# Patient Record
Sex: MALE | Race: WHITE | NOT HISPANIC OR LATINO | Employment: OTHER | URBAN - METROPOLITAN AREA
[De-identification: names, ages, dates, MRNs, and addresses within clinical notes are randomized per-mention and may not be internally consistent; named-entity substitution may affect disease eponyms.]

---

## 2017-06-29 ENCOUNTER — HOSPITAL ENCOUNTER (OUTPATIENT)
Dept: CARDIOLOGY | Facility: HOSPITAL | Age: 67
Discharge: HOME | End: 2017-06-29
Attending: NURSE PRACTITIONER | Admitting: NURSE PRACTITIONER

## 2019-06-11 ENCOUNTER — TRANSCRIBE ORDERS (OUTPATIENT)
Dept: ADMINISTRATIVE | Facility: HOSPITAL | Age: 69
End: 2019-06-11

## 2019-06-11 DIAGNOSIS — I10 HYPERTENSION, UNSPECIFIED TYPE: ICD-10-CM

## 2019-06-11 DIAGNOSIS — I25.10 CVD (CARDIOVASCULAR DISEASE): Primary | ICD-10-CM

## 2019-07-08 ENCOUNTER — HOSPITAL ENCOUNTER (OUTPATIENT)
Dept: NUCLEAR MEDICINE | Facility: HOSPITAL | Age: 69
Discharge: HOME | End: 2019-07-08

## 2019-07-08 DIAGNOSIS — I25.10 CVD (CARDIOVASCULAR DISEASE): ICD-10-CM

## 2019-07-08 DIAGNOSIS — I10 HYPERTENSION, UNSPECIFIED TYPE: ICD-10-CM

## 2019-07-08 LAB
BH CV NUCLEAR PRIOR STUDY: 3
BH CV STRESS BP STAGE 1: NORMAL
BH CV STRESS BP STAGE 2: NORMAL
BH CV STRESS DURATION MIN STAGE 1: 3
BH CV STRESS DURATION MIN STAGE 2: 3
BH CV STRESS DURATION SEC STAGE 1: 0
BH CV STRESS DURATION SEC STAGE 2: 0
BH CV STRESS GRADE STAGE 1: 10
BH CV STRESS GRADE STAGE 2: 12
BH CV STRESS HR STAGE 1: 108
BH CV STRESS HR STAGE 2: 128
BH CV STRESS METS STAGE 1: 5
BH CV STRESS METS STAGE 2: 7.5
BH CV STRESS PROTOCOL 1: NORMAL
BH CV STRESS RECOVERY BP: NORMAL MMHG
BH CV STRESS RECOVERY HR: 65 BPM
BH CV STRESS SPEED STAGE 1: 1.7
BH CV STRESS SPEED STAGE 2: 2.5
BH CV STRESS STAGE 1: 1
BH CV STRESS STAGE 2: 2
LV EF NUC BP: 46 %
MAXIMAL PREDICTED HEART RATE: 151 BPM
PERCENT MAX PREDICTED HR: 84.77 %
STRESS BASELINE BP: NORMAL MMHG
STRESS BASELINE HR: 58 BPM
STRESS PERCENT HR: 100 %
STRESS POST EXERCISE DUR MIN: 4 MIN
STRESS POST EXERCISE DUR SEC: 30 SEC
STRESS POST PEAK BP: NORMAL MMHG
STRESS POST PEAK HR: 128 BPM
STRESS TARGET HR: 128 BPM

## 2019-07-08 PROCEDURE — 78452 HT MUSCLE IMAGE SPECT MULT: CPT

## 2019-07-08 PROCEDURE — A9500 TC99M SESTAMIBI: HCPCS | Performed by: INTERNAL MEDICINE

## 2019-07-08 PROCEDURE — 0 TECHNETIUM SESTAMIBI: Performed by: INTERNAL MEDICINE

## 2019-07-08 PROCEDURE — 93016 CV STRESS TEST SUPVJ ONLY: CPT | Performed by: NURSE PRACTITIONER

## 2019-07-08 PROCEDURE — 78452 HT MUSCLE IMAGE SPECT MULT: CPT | Performed by: INTERNAL MEDICINE

## 2019-07-08 PROCEDURE — 93017 CV STRESS TEST TRACING ONLY: CPT

## 2019-07-08 PROCEDURE — 93018 CV STRESS TEST I&R ONLY: CPT | Performed by: INTERNAL MEDICINE

## 2019-07-08 RX ADMIN — TECHNETIUM TC 99M SESTAMIBI 1 DOSE: 1 INJECTION INTRAVENOUS at 10:00

## 2019-07-08 RX ADMIN — TECHNETIUM TC 99M SESTAMIBI 1 DOSE: 1 INJECTION INTRAVENOUS at 11:05

## 2019-08-05 ENCOUNTER — OFFICE VISIT (OUTPATIENT)
Dept: CARDIOLOGY | Facility: CLINIC | Age: 69
End: 2019-08-05

## 2019-08-05 VITALS
DIASTOLIC BLOOD PRESSURE: 72 MMHG | OXYGEN SATURATION: 99 % | WEIGHT: 193.4 LBS | SYSTOLIC BLOOD PRESSURE: 122 MMHG | BODY MASS INDEX: 31.08 KG/M2 | HEIGHT: 66 IN | HEART RATE: 59 BPM

## 2019-08-05 DIAGNOSIS — Z95.1 STATUS POST CORONARY ARTERY BYPASS GRAFT: ICD-10-CM

## 2019-08-05 DIAGNOSIS — I10 ESSENTIAL HYPERTENSION: ICD-10-CM

## 2019-08-05 DIAGNOSIS — I65.23 BILATERAL CAROTID ARTERY STENOSIS: ICD-10-CM

## 2019-08-05 DIAGNOSIS — I25.118 CORONARY ARTERY DISEASE OF NATIVE ARTERY OF NATIVE HEART WITH STABLE ANGINA PECTORIS (HCC): Primary | ICD-10-CM

## 2019-08-05 PROBLEM — R00.1 BRADYCARDIA: Status: ACTIVE | Noted: 2018-06-13

## 2019-08-05 PROBLEM — I25.10 CORONARY ARTERY DISEASE: Status: ACTIVE | Noted: 2019-08-05

## 2019-08-05 PROBLEM — E78.2 HYPERLIPIDEMIA, MIXED: Status: ACTIVE | Noted: 2019-08-05

## 2019-08-05 PROCEDURE — 99213 OFFICE O/P EST LOW 20 MIN: CPT | Performed by: INTERNAL MEDICINE

## 2019-08-05 RX ORDER — AMLODIPINE BESYLATE 5 MG/1
5 TABLET ORAL DAILY
Refills: 2 | COMMUNITY
Start: 2019-07-12 | End: 2019-11-04 | Stop reason: SDUPTHER

## 2019-08-05 RX ORDER — HYDROCODONE BITARTRATE AND ACETAMINOPHEN 7.5; 325 MG/1; MG/1
1-2 TABLET ORAL
COMMUNITY
Start: 2018-11-22 | End: 2020-08-06

## 2019-08-05 RX ORDER — LOSARTAN POTASSIUM AND HYDROCHLOROTHIAZIDE 12.5; 1 MG/1; MG/1
1 TABLET ORAL DAILY
Refills: 3 | COMMUNITY
Start: 2019-06-13 | End: 2020-08-06

## 2019-08-05 RX ORDER — NITROGLYCERIN 0.4 MG/1
TABLET SUBLINGUAL
COMMUNITY
Start: 2015-06-08 | End: 2022-09-06 | Stop reason: SDUPTHER

## 2019-08-05 RX ORDER — NAPROXEN SODIUM 220 MG
TABLET ORAL 2 TIMES DAILY
COMMUNITY
Start: 2018-06-07 | End: 2021-08-06

## 2019-08-05 RX ORDER — GEMFIBROZIL 600 MG/1
TABLET, FILM COATED ORAL DAILY
COMMUNITY
Start: 2018-05-29 | End: 2019-11-04 | Stop reason: SDUPTHER

## 2019-08-05 RX ORDER — GLIPIZIDE 5 MG/1
2.5 TABLET ORAL DAILY
COMMUNITY
Start: 2019-08-01

## 2019-08-05 RX ORDER — CYCLOBENZAPRINE HCL 10 MG
10 TABLET ORAL 2 TIMES DAILY
COMMUNITY
Start: 2018-11-21 | End: 2020-08-06

## 2019-11-04 RX ORDER — AMLODIPINE BESYLATE 5 MG/1
TABLET ORAL
Qty: 90 TABLET | Refills: 2 | Status: SHIPPED | OUTPATIENT
Start: 2019-11-04 | End: 2020-08-25

## 2019-11-04 RX ORDER — GEMFIBROZIL 600 MG/1
TABLET, FILM COATED ORAL
Qty: 90 TABLET | Refills: 3 | Status: SHIPPED | OUTPATIENT
Start: 2019-11-04 | End: 2020-12-02

## 2020-07-13 RX ORDER — LOSARTAN POTASSIUM 100 MG/1
TABLET ORAL
Qty: 90 TABLET | Refills: 0 | Status: SHIPPED | OUTPATIENT
Start: 2020-07-13 | End: 2020-10-19

## 2020-08-06 ENCOUNTER — OFFICE VISIT (OUTPATIENT)
Dept: CARDIOLOGY | Facility: CLINIC | Age: 70
End: 2020-08-06

## 2020-08-06 VITALS
SYSTOLIC BLOOD PRESSURE: 122 MMHG | WEIGHT: 189 LBS | HEART RATE: 54 BPM | OXYGEN SATURATION: 98 % | DIASTOLIC BLOOD PRESSURE: 72 MMHG | HEIGHT: 66 IN | BODY MASS INDEX: 30.37 KG/M2

## 2020-08-06 DIAGNOSIS — I25.10 CORONARY ARTERY DISEASE INVOLVING NATIVE CORONARY ARTERY OF NATIVE HEART WITHOUT ANGINA PECTORIS: Primary | ICD-10-CM

## 2020-08-06 DIAGNOSIS — I10 ESSENTIAL HYPERTENSION: ICD-10-CM

## 2020-08-06 PROCEDURE — 99213 OFFICE O/P EST LOW 20 MIN: CPT | Performed by: INTERNAL MEDICINE

## 2020-08-06 PROCEDURE — 93000 ELECTROCARDIOGRAM COMPLETE: CPT | Performed by: INTERNAL MEDICINE

## 2020-08-06 NOTE — PROGRESS NOTES
Cardiology Office Visit Note      Referring physician:  Lupe Santos APRN    Reason For Followup: Annual    HPI:  Tye Avilez is a 69 y.o. male. Presents today for his annual follow up. Patient has known  history of advanced  coronary artery disease requiring CABG in 1998, angioplasty with intracoronary stent to the saphenous vein graft to the posterior lateral marginal branch 2011. Additionally he has a history of hypertensive heart disease, dyslipidemia, degenerative joint disease, DM and history of polio    Patient b/p at home has been in the range of 120's.    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    Patient does no exercising daily, and yet maintains satisfactory functional activity status which is limited most by his chronic back pain.    Patient denies any chest pain,SOB,PND,Palpitations, or any racing of the heart.     Patient is compliant medications as listed and reviewed in detail today.        Past Medical History:   Diagnosis Date   • Coronary artery disease    • Hyperlipidemia    • Hypertension        Past Surgical History:   Procedure Laterality Date   • CARDIAC CATHETERIZATION      2015   • CAROTID STENT  2015    PCI  to SVG supplying the LC/OM   • CORONARY ARTERY BYPASS GRAFT      1998         Current Outpatient Medications:   •  amLODIPine (NORVASC) 5 MG tablet, TAKE 1 TABLET BY MOUTH ONCE DAILY, Disp: 90 tablet, Rfl: 2  •  aspirin 81 MG tablet, Take 81 mg by mouth Daily., Disp: , Rfl:   •  gemfibrozil (LOPID) 600 MG tablet, TAKE 1 TABLET BY MOUTH ONCE DAILY, Disp: 90 tablet, Rfl: 3  •  glipiZIDE (GLUCOTROL) 5 MG tablet, Daily., Disp: , Rfl:   •  losartan (COZAAR) 100 MG tablet, Take 1 tablet by mouth once daily (Patient taking differently: Take 50 mg by mouth Daily.), Disp: 90 tablet, Rfl: 0  •  metFORMIN (GLUCOPHAGE) 500 MG tablet, Take 1,500 mg by mouth See Admin Instructions. take 2 tablets by mouth in the morning and 1 in the  "evening, Disp: , Rfl: 0  •  naproxen sodium (ALEVE) 220 MG tablet, 2 (Two) Times a Day. prn, Disp: , Rfl:   •  nitroglycerin (NITROSTAT) 0.4 MG SL tablet, Prn chest pain, Disp: , Rfl:     Social History     Socioeconomic History   • Marital status:      Spouse name: Not on file   • Number of children: Not on file   • Years of education: Not on file   • Highest education level: Not on file   Tobacco Use   • Smoking status: Former Smoker   • Smokeless tobacco: Never Used   Substance and Sexual Activity   • Alcohol use: Never     Frequency: Never   • Drug use: Never   • Sexual activity: Defer       History reviewed. No pertinent family history.      Review of Systems   General: denies fever, chills, anorexia, weight loss  Eyes: denies blurring, diplopia  Ear/Nose/Throat: denies ear pain, nosebleeds, hoarseness  Cardiovascular: See HPI  Respiratory: denies excessive sputum, hemoptysis, wheezing  Gastrointestinal: denies nausea, vomiting, change in bowel habits, abdominal pain  Genitourinary: Nocturia usually once per night; no hematuria or dysuria reported  Musculoskeletal: Chronic low back pain is functionally limiting-see HPI  Skin: denies rashes, itching, suspicious lesions  Neurologic: denies focal neuro deficits  Psychiatric: denies depression, anxiety  Endocrine: denies cold intolerance, heat intolerance  Hematologic/Lymphatic: denies abnormal bruising, bleeding  Allergic/Immunologic: denies urticaria or persistent infections      Objective     Visit Vitals  /72   Pulse 54   Ht 167.6 cm (65.98\")   Wt 85.7 kg (189 lb)   SpO2 98%   BMI 30.52 kg/m²           Physical Exam  General:     Obese, well developed,, in no acute distress.    Head:     normocephalic and atraumatic.    Eyes:    PERRL/EOM intact, conjunctiva and sclera clear with out nystagmus.    Neck:    no jvd or bruits  Chest Wall:    no deformities   Lungs:    clear bilaterally to auscultation with adequate global airflow   Heart:    " non-displaced PMI; regular rate and rhythm, normal S1, S2 without murmurs, rubs, or gallops  Abdomen:  Soft, nontender without HSM  Msk:    no deformity; adequate R OM  Pulses:    pulses normal in all 4 extremities.    Extremities:    no clubbing, cyanosis, edema   Neurologic:    no focal sensory or motor deficits  Skin:    intact without lesions or rashes.    Psych:    alert and cooperative; normal mood and affect; normal attention span and concentration.            ECG 12 Lead  Date/Time: 8/6/2020 7:58 AM  Performed by: IRWIN Murguia MD  Authorized by: IRWIN Murguia MD   Comparison: not compared with previous ECG   Previous ECG: no previous ECG available  Rhythm: sinus rhythm    Clinical impression: normal ECG              Assessment:   Problems Addressed this Visit        Cardiovascular and Mediastinum    Coronary artery disease - Primary    -Remains hemodynamically stable well compensated and angina free  -Status post multivessel CABG 2008 with subsequent PCI to SVG to left circumflex 2011        Essential hypertension    -Well-regulated on current medication listed and reviewed in detail       Dyslipidemia  -Maintained on Lopid; statin intolerant.  -Followed by PCP         Plan:  Continue current medications as listed reviewed in detail today as patient remains hemodynamically stable well compensated and free of angina or any other cardiopulmonary issues at this time.  Weight reduction regular progressive exercise and ongoing healthy heart lifestyle is encouraged today.  We will see Mr. Caballero for follow-up in 1 year or sooner if needed.    IRWIN Murguia MD  8/6/2020 11:35    This report was generated using the Dragon voice recognition system.

## 2020-08-25 RX ORDER — AMLODIPINE BESYLATE 5 MG/1
TABLET ORAL
Qty: 90 TABLET | Refills: 0 | Status: SHIPPED | OUTPATIENT
Start: 2020-08-25 | End: 2020-12-01

## 2020-10-19 RX ORDER — LOSARTAN POTASSIUM 100 MG/1
TABLET ORAL
Qty: 90 TABLET | Refills: 2 | Status: SHIPPED | OUTPATIENT
Start: 2020-10-19 | End: 2021-09-03

## 2020-12-01 DIAGNOSIS — E78.2 HYPERLIPIDEMIA, MIXED: Primary | ICD-10-CM

## 2020-12-01 RX ORDER — AMLODIPINE BESYLATE 5 MG/1
TABLET ORAL
Qty: 90 TABLET | Refills: 0 | Status: SHIPPED | OUTPATIENT
Start: 2020-12-01 | End: 2021-10-12 | Stop reason: SDUPTHER

## 2020-12-02 RX ORDER — CYANOCOBALAMIN 1000 UG/ML
INJECTION, SOLUTION INTRAMUSCULAR; SUBCUTANEOUS
COMMUNITY
Start: 2020-08-31 | End: 2021-08-06

## 2020-12-02 RX ORDER — GEMFIBROZIL 600 MG/1
TABLET, FILM COATED ORAL
Qty: 90 TABLET | Refills: 0 | Status: SHIPPED | OUTPATIENT
Start: 2020-12-02 | End: 2021-10-12 | Stop reason: SDUPTHER

## 2020-12-15 ENCOUNTER — TELEPHONE (OUTPATIENT)
Dept: CARDIOLOGY | Facility: CLINIC | Age: 70
End: 2020-12-15

## 2021-03-09 RX ORDER — GEMFIBROZIL 600 MG/1
TABLET, FILM COATED ORAL
Qty: 90 TABLET | Refills: 0 | OUTPATIENT
Start: 2021-03-09

## 2021-03-09 RX ORDER — AMLODIPINE BESYLATE 5 MG/1
TABLET ORAL
Qty: 90 TABLET | Refills: 0 | OUTPATIENT
Start: 2021-03-09

## 2021-08-06 ENCOUNTER — OFFICE VISIT (OUTPATIENT)
Dept: CARDIOLOGY | Facility: CLINIC | Age: 71
End: 2021-08-06

## 2021-08-06 VITALS
HEIGHT: 66 IN | DIASTOLIC BLOOD PRESSURE: 78 MMHG | HEART RATE: 61 BPM | SYSTOLIC BLOOD PRESSURE: 158 MMHG | BODY MASS INDEX: 31.34 KG/M2 | WEIGHT: 195 LBS | OXYGEN SATURATION: 96 %

## 2021-08-06 DIAGNOSIS — I10 ESSENTIAL HYPERTENSION: ICD-10-CM

## 2021-08-06 DIAGNOSIS — R00.1 BRADYCARDIA: ICD-10-CM

## 2021-08-06 DIAGNOSIS — I25.10 CORONARY ARTERY DISEASE INVOLVING NATIVE CORONARY ARTERY OF NATIVE HEART WITHOUT ANGINA PECTORIS: Primary | ICD-10-CM

## 2021-08-06 PROCEDURE — 99214 OFFICE O/P EST MOD 30 MIN: CPT | Performed by: INTERNAL MEDICINE

## 2021-08-06 PROCEDURE — 93000 ELECTROCARDIOGRAM COMPLETE: CPT | Performed by: INTERNAL MEDICINE

## 2021-08-06 NOTE — PROGRESS NOTES
Cardiology Office Visit Note      Referring physician:  Lupe Santos APRN    Reason For Followup: CAD, HTN,HLD    HPI:  Tye Avilez is a 70 y.o. male who is returning to the office for an annual check up. He has a known history of advanced coronary artery disease requiring CABG in 1998, angioplasty with intracoronary stent to the saphenous vein graft to the posterior lateral marginal branch 2011. Additionally, he has a history of hypertensive heart disease, dyslipidemia, degenerative joint disease, DM and history of polio.    He has been getting lightheaded when he is overheated and he feels tightness and short of air when he walks up long hills/inclines, and sometimes in cold weather.  He denies chest pain at rest or awakening from sleep and he does not feel he has significant increase in SOA/GUTIERREZ, or significantly declining functional capacity, other than that expected from age and level of conditioning.  He denies palpitations, near syncope/ syncope, edema, PND or orthopnea.  Patient b/p at home has been in the range of 120's.    Sj denies other significant constitutional symptoms and he has received Covid vaccination.    Medications were reviewed during his visit today      Past Medical History:   Diagnosis Date   • Coronary artery disease    • Hyperlipidemia    • Hypertension        Past Surgical History:   Procedure Laterality Date   • CARDIAC CATHETERIZATION      2015   • CAROTID STENT  2015    PCI  to SVG supplying the LC/OM   • CORONARY ARTERY BYPASS GRAFT      1998         Current Outpatient Medications:   •  amLODIPine (NORVASC) 5 MG tablet, Take 1 tablet by mouth once daily, Disp: 90 tablet, Rfl: 0  •  aspirin 81 MG tablet, Take 81 mg by mouth Daily., Disp: , Rfl:   •  cyanocobalamin 1000 MCG/ML injection, INJECT 1 ML INTRAMUSCULARLY PER MONTH FOR 10 DOSES PER TIMES, Disp: , Rfl:   •  gemfibrozil (LOPID) 600 MG tablet, Take 1 tablet by mouth once daily, Disp: 90 tablet, Rfl: 0  •  glipiZIDE  (GLUCOTROL) 5 MG tablet, Daily., Disp: , Rfl:   •  losartan (COZAAR) 100 MG tablet, Take 1 tablet by mouth once daily, Disp: 90 tablet, Rfl: 2  •  metFORMIN (GLUCOPHAGE) 500 MG tablet, Take 1,500 mg by mouth See Admin Instructions. take 2 tablets by mouth in the morning and 1 in the evening, Disp: , Rfl: 0  •  naproxen sodium (ALEVE) 220 MG tablet, 2 (Two) Times a Day. prn, Disp: , Rfl:   •  nitroglycerin (NITROSTAT) 0.4 MG SL tablet, Prn chest pain, Disp: , Rfl:     Social History     Socioeconomic History   • Marital status:      Spouse name: Not on file   • Number of children: Not on file   • Years of education: Not on file   • Highest education level: Not on file   Tobacco Use   • Smoking status: Former Smoker   • Smokeless tobacco: Never Used   Substance and Sexual Activity   • Alcohol use: Never   • Drug use: Never   • Sexual activity: Defer       No family history on file.      Review of Systems   General: denies fever, chills, anorexia, weight loss  Eyes: denies blurring, diplopia  Ear/Nose/Throat: denies ear pain, nosebleeds, hoarseness  Cardiovascular: See HPI  Respiratory: denies excessive sputum, hemoptysis, wheezing  Gastrointestinal: denies nausea, vomiting, change in bowel habits, abdominal pain  Genitourinary: Mild nocturia but no hematuria or dysuria reported  Musculoskeletal: Modest generalized arthralgias though not functionally limiting  Skin: denies rashes, itching, suspicious lesions  Neurologic: denies focal neuro deficits  Psychiatric: denies depression, anxiety  Endocrine: denies cold intolerance, heat intolerance  Hematologic/Lymphatic: denies abnormal bruising, bleeding  Allergic/Immunologic: denies urticaria or persistent infections      Objective     There were no vitals taken for this visit.        Physical Exam  General:     Obese, well developed,, in no acute distress.    Head:     normocephalic and atraumatic.    Eyes:    PERRL/EOM intact, conjunctiva and sclera clear with out  nystagmus.    Neck:    no jvd or bruits  Chest Wall:    no deformities   Lungs:    clear bilaterally to auscultation with adequate global airflow   Heart:    non-displaced PMI; regular rate and rhythm, normal S1, S2 without murmurs, rubs, or gallops  Abdomen:  Soft, nontender without HSM  Msk:    no deformity; adequate R OM  Pulses:    pulses normal in all 4 extremities.    Extremities:    no clubbing, cyanosis, edema   Neurologic:    no focal sensory or motor deficits  Skin:    intact without lesions or rashes.    Psych:    alert and cooperative; normal mood and affect; normal attention span and concentration.            ECG 12 Lead    Date/Time: 8/6/2021 9:38 AM  Performed by: IRWIN Murguia MD  Authorized by: IRWIN Murguia MD   Comparison: compared with previous ECG from 8/6/2020  Similar to previous ECG  Rhythm: sinus rhythm  Q waves: II, III and aVF      Clinical impression: abnormal EKG  Comments: Abnormal EKG shows sinus rhythm with inferior Q waves and R wave in V2 suggesting inferior wall infarct with with posterior involvement.  No change from previous EKG.              Assessment:   Problems Addressed this Visit        Other    Bradycardia  -Heart rate currently low 60s, asymptomatic on no rate modifying medications      Coronary artery disease - Primary  -Status post remote CABG with subsequent PCI; remains hemodynamically stable well compensated with stable effort angina      Essential hypertension  -Marginally well-regulated based on today's office blood pressure reading though patient has had office hypertension in the past.      Diagnoses       Codes Comments    Coronary artery disease involving native coronary artery of native heart without angina pectoris    -  Primary ICD-10-CM: I25.10  ICD-9-CM: 414.01     Essential hypertension     ICD-10-CM: I10  ICD-9-CM: 401.9     Bradycardia     ICD-10-CM: R00.1  ICD-9-CM: 427.89             Plan:   Ada advised to keep home BP/HR log and contact  this office if not maintaining satisfactory blood pressure readings below 130/80 at least 75% of the time.  Otherwise appears hemodynamically stable well compensated with stable effort angina.  I am recommending isosorbide mononitrate 30 mg p.o. nightly and continued risk factor modification for secondary prevention.  Return to clinic annually or sooner if needed.    Haylee Temple MA  8/6/2021 08:24 EDT    This report was generated using the Dragon voice recognition system.

## 2021-09-03 RX ORDER — LOSARTAN POTASSIUM 100 MG/1
TABLET ORAL
Qty: 90 TABLET | Refills: 0 | Status: SHIPPED | OUTPATIENT
Start: 2021-09-03 | End: 2021-12-14

## 2021-10-12 RX ORDER — GEMFIBROZIL 600 MG/1
600 TABLET, FILM COATED ORAL DAILY
Qty: 90 TABLET | Refills: 1 | Status: SHIPPED | OUTPATIENT
Start: 2021-10-12 | End: 2022-05-10

## 2021-10-12 RX ORDER — AMLODIPINE BESYLATE 5 MG/1
5 TABLET ORAL DAILY
Qty: 90 TABLET | Refills: 1 | Status: SHIPPED | OUTPATIENT
Start: 2021-10-12 | End: 2022-05-10

## 2021-12-14 RX ORDER — LOSARTAN POTASSIUM 100 MG/1
TABLET ORAL
Qty: 90 TABLET | Refills: 0 | Status: SHIPPED | OUTPATIENT
Start: 2021-12-14 | End: 2022-04-25 | Stop reason: SDUPTHER

## 2022-04-25 RX ORDER — LOSARTAN POTASSIUM 100 MG/1
100 TABLET ORAL DAILY
Qty: 90 TABLET | Refills: 0 | Status: SHIPPED | OUTPATIENT
Start: 2022-04-25 | End: 2022-07-25

## 2022-05-10 RX ORDER — GEMFIBROZIL 600 MG/1
TABLET, FILM COATED ORAL
Qty: 90 TABLET | Refills: 0 | Status: SHIPPED | OUTPATIENT
Start: 2022-05-10 | End: 2022-09-16

## 2022-05-10 RX ORDER — AMLODIPINE BESYLATE 5 MG/1
TABLET ORAL
Qty: 90 TABLET | Refills: 0 | Status: ON HOLD | OUTPATIENT
Start: 2022-05-10 | End: 2022-08-10

## 2022-07-25 RX ORDER — LOSARTAN POTASSIUM 100 MG/1
TABLET ORAL
Qty: 90 TABLET | Refills: 0 | Status: SHIPPED | OUTPATIENT
Start: 2022-07-25 | End: 2022-08-16

## 2022-08-02 ENCOUNTER — OFFICE VISIT (OUTPATIENT)
Dept: CARDIOLOGY | Facility: CLINIC | Age: 72
End: 2022-08-02

## 2022-08-02 VITALS
HEART RATE: 58 BPM | WEIGHT: 194.4 LBS | SYSTOLIC BLOOD PRESSURE: 178 MMHG | DIASTOLIC BLOOD PRESSURE: 80 MMHG | RESPIRATION RATE: 18 BRPM | BODY MASS INDEX: 31.24 KG/M2 | HEIGHT: 66 IN

## 2022-08-02 DIAGNOSIS — I25.118 CORONARY ARTERY DISEASE OF NATIVE ARTERY OF NATIVE HEART WITH STABLE ANGINA PECTORIS: Primary | ICD-10-CM

## 2022-08-02 DIAGNOSIS — I10 ESSENTIAL HYPERTENSION: ICD-10-CM

## 2022-08-02 DIAGNOSIS — Z01.818 PRE-OP TESTING: ICD-10-CM

## 2022-08-02 PROCEDURE — 93005 ELECTROCARDIOGRAM TRACING: CPT | Performed by: INTERNAL MEDICINE

## 2022-08-02 PROCEDURE — 99214 OFFICE O/P EST MOD 30 MIN: CPT | Performed by: INTERNAL MEDICINE

## 2022-08-02 RX ORDER — CLOPIDOGREL BISULFATE 75 MG/1
75 TABLET ORAL DAILY
Qty: 30 TABLET | Refills: 5 | Status: SHIPPED | OUTPATIENT
Start: 2022-08-02 | End: 2022-12-15

## 2022-08-08 ENCOUNTER — LAB (OUTPATIENT)
Dept: LAB | Facility: HOSPITAL | Age: 72
End: 2022-08-08

## 2022-08-08 DIAGNOSIS — Z01.818 PRE-OP TESTING: ICD-10-CM

## 2022-08-08 DIAGNOSIS — I25.118 CORONARY ARTERY DISEASE OF NATIVE ARTERY OF NATIVE HEART WITH STABLE ANGINA PECTORIS: ICD-10-CM

## 2022-08-08 LAB
ALBUMIN SERPL-MCNC: 4.3 G/DL (ref 3.5–5.2)
ALBUMIN/GLOB SERPL: 1.4 G/DL
ALP SERPL-CCNC: 42 U/L (ref 39–117)
ALT SERPL W P-5'-P-CCNC: 34 U/L (ref 1–41)
ANION GAP SERPL CALCULATED.3IONS-SCNC: 9.9 MMOL/L (ref 5–15)
AST SERPL-CCNC: 25 U/L (ref 1–40)
BASOPHILS # BLD AUTO: 0.09 10*3/MM3 (ref 0–0.2)
BASOPHILS NFR BLD AUTO: 0.9 % (ref 0–1.5)
BILIRUB SERPL-MCNC: <0.2 MG/DL (ref 0–1.2)
BUN SERPL-MCNC: 17 MG/DL (ref 8–23)
BUN/CREAT SERPL: 20.7 (ref 7–25)
CALCIUM SPEC-SCNC: 9.4 MG/DL (ref 8.6–10.5)
CHLORIDE SERPL-SCNC: 105 MMOL/L (ref 98–107)
CO2 SERPL-SCNC: 24.1 MMOL/L (ref 22–29)
CREAT SERPL-MCNC: 0.82 MG/DL (ref 0.76–1.27)
DEPRECATED RDW RBC AUTO: 43 FL (ref 37–54)
EGFRCR SERPLBLD CKD-EPI 2021: 93.3 ML/MIN/1.73
EOSINOPHIL # BLD AUTO: 0.27 10*3/MM3 (ref 0–0.4)
EOSINOPHIL NFR BLD AUTO: 2.8 % (ref 0.3–6.2)
ERYTHROCYTE [DISTWIDTH] IN BLOOD BY AUTOMATED COUNT: 13.3 % (ref 12.3–15.4)
GLOBULIN UR ELPH-MCNC: 3 GM/DL
GLUCOSE SERPL-MCNC: 126 MG/DL (ref 65–99)
HCT VFR BLD AUTO: 38.5 % (ref 37.5–51)
HGB BLD-MCNC: 12.8 G/DL (ref 13–17.7)
IMM GRANULOCYTES # BLD AUTO: 0.04 10*3/MM3 (ref 0–0.05)
IMM GRANULOCYTES NFR BLD AUTO: 0.4 % (ref 0–0.5)
INR PPP: 1.01 (ref 0.93–1.1)
LYMPHOCYTES # BLD AUTO: 3.24 10*3/MM3 (ref 0.7–3.1)
LYMPHOCYTES NFR BLD AUTO: 33.8 % (ref 19.6–45.3)
MCH RBC QN AUTO: 29.3 PG (ref 26.6–33)
MCHC RBC AUTO-ENTMCNC: 33.2 G/DL (ref 31.5–35.7)
MCV RBC AUTO: 88.1 FL (ref 79–97)
MONOCYTES # BLD AUTO: 1.01 10*3/MM3 (ref 0.1–0.9)
MONOCYTES NFR BLD AUTO: 10.5 % (ref 5–12)
NEUTROPHILS NFR BLD AUTO: 4.95 10*3/MM3 (ref 1.7–7)
NEUTROPHILS NFR BLD AUTO: 51.6 % (ref 42.7–76)
NRBC BLD AUTO-RTO: 0 /100 WBC (ref 0–0.2)
PLATELET # BLD AUTO: 302 10*3/MM3 (ref 140–450)
PMV BLD AUTO: 10.9 FL (ref 6–12)
POTASSIUM SERPL-SCNC: 4.7 MMOL/L (ref 3.5–5.2)
PROT SERPL-MCNC: 7.3 G/DL (ref 6–8.5)
PROTHROMBIN TIME: 10.4 SECONDS (ref 9.6–11.7)
RBC # BLD AUTO: 4.37 10*6/MM3 (ref 4.14–5.8)
SARS-COV-2 ORF1AB RESP QL NAA+PROBE: NOT DETECTED
SODIUM SERPL-SCNC: 139 MMOL/L (ref 136–145)
WBC NRBC COR # BLD: 9.6 10*3/MM3 (ref 3.4–10.8)

## 2022-08-08 PROCEDURE — 36415 COLL VENOUS BLD VENIPUNCTURE: CPT | Performed by: INTERNAL MEDICINE

## 2022-08-08 PROCEDURE — 80053 COMPREHEN METABOLIC PANEL: CPT | Performed by: INTERNAL MEDICINE

## 2022-08-08 PROCEDURE — U0004 COV-19 TEST NON-CDC HGH THRU: HCPCS

## 2022-08-08 PROCEDURE — C9803 HOPD COVID-19 SPEC COLLECT: HCPCS

## 2022-08-08 PROCEDURE — 85025 COMPLETE CBC W/AUTO DIFF WBC: CPT | Performed by: INTERNAL MEDICINE

## 2022-08-08 PROCEDURE — 85610 PROTHROMBIN TIME: CPT | Performed by: INTERNAL MEDICINE

## 2022-08-08 PROCEDURE — U0005 INFEC AGEN DETEC AMPLI PROBE: HCPCS

## 2022-08-10 ENCOUNTER — HOSPITAL ENCOUNTER (OUTPATIENT)
Facility: HOSPITAL | Age: 72
Setting detail: HOSPITAL OUTPATIENT SURGERY
Discharge: HOME | End: 2022-08-10
Attending: INTERNAL MEDICINE | Admitting: INTERNAL MEDICINE

## 2022-08-10 VITALS
HEIGHT: 65 IN | BODY MASS INDEX: 32.73 KG/M2 | OXYGEN SATURATION: 93 % | SYSTOLIC BLOOD PRESSURE: 116 MMHG | WEIGHT: 196.43 LBS | RESPIRATION RATE: 14 BRPM | DIASTOLIC BLOOD PRESSURE: 44 MMHG | HEART RATE: 60 BPM | TEMPERATURE: 97.9 F

## 2022-08-10 DIAGNOSIS — I25.118 CORONARY ARTERY DISEASE OF NATIVE ARTERY OF NATIVE HEART WITH STABLE ANGINA PECTORIS: ICD-10-CM

## 2022-08-10 LAB — GLUCOSE BLDC GLUCOMTR-MCNC: 144 MG/DL (ref 70–105)

## 2022-08-10 PROCEDURE — 82962 GLUCOSE BLOOD TEST: CPT

## 2022-08-10 PROCEDURE — C1769 GUIDE WIRE: HCPCS | Performed by: INTERNAL MEDICINE

## 2022-08-10 PROCEDURE — 93459 L HRT ART/GRFT ANGIO: CPT | Performed by: INTERNAL MEDICINE

## 2022-08-10 PROCEDURE — 25010000002 MIDAZOLAM PER 1 MG: Performed by: INTERNAL MEDICINE

## 2022-08-10 PROCEDURE — 99152 MOD SED SAME PHYS/QHP 5/>YRS: CPT | Performed by: INTERNAL MEDICINE

## 2022-08-10 PROCEDURE — 99153 MOD SED SAME PHYS/QHP EA: CPT | Performed by: INTERNAL MEDICINE

## 2022-08-10 PROCEDURE — 0 IOPAMIDOL PER 1 ML: Performed by: INTERNAL MEDICINE

## 2022-08-10 PROCEDURE — 25010000002 HYDRALAZINE PER 20 MG: Performed by: INTERNAL MEDICINE

## 2022-08-10 PROCEDURE — 25010000002 FENTANYL CITRATE (PF) 100 MCG/2ML SOLUTION: Performed by: INTERNAL MEDICINE

## 2022-08-10 PROCEDURE — C1894 INTRO/SHEATH, NON-LASER: HCPCS | Performed by: INTERNAL MEDICINE

## 2022-08-10 RX ORDER — LIDOCAINE HYDROCHLORIDE 20 MG/ML
INJECTION, SOLUTION INFILTRATION; PERINEURAL AS NEEDED
Status: DISCONTINUED | OUTPATIENT
Start: 2022-08-10 | End: 2022-08-10 | Stop reason: HOSPADM

## 2022-08-10 RX ORDER — ACETAMINOPHEN 325 MG/1
650 TABLET ORAL EVERY 4 HOURS PRN
Status: DISCONTINUED | OUTPATIENT
Start: 2022-08-10 | End: 2022-08-10 | Stop reason: HOSPADM

## 2022-08-10 RX ORDER — ISOSORBIDE MONONITRATE 30 MG/1
30 TABLET, EXTENDED RELEASE ORAL DAILY
Qty: 90 TABLET | Refills: 3 | Status: SHIPPED | OUTPATIENT
Start: 2022-08-10 | Stop reason: DRUGHIGH

## 2022-08-10 RX ORDER — HYDRALAZINE HYDROCHLORIDE 20 MG/ML
INJECTION INTRAMUSCULAR; INTRAVENOUS AS NEEDED
Status: DISCONTINUED | OUTPATIENT
Start: 2022-08-10 | End: 2022-08-10 | Stop reason: HOSPADM

## 2022-08-10 RX ORDER — SODIUM CHLORIDE 9 MG/ML
INJECTION, SOLUTION INTRAVENOUS CONTINUOUS PRN
Status: COMPLETED | OUTPATIENT
Start: 2022-08-10 | End: 2022-08-10

## 2022-08-10 RX ORDER — MIDAZOLAM HYDROCHLORIDE 1 MG/ML
INJECTION INTRAMUSCULAR; INTRAVENOUS AS NEEDED
Status: DISCONTINUED | OUTPATIENT
Start: 2022-08-10 | End: 2022-08-10 | Stop reason: HOSPADM

## 2022-08-10 RX ORDER — AMLODIPINE BESYLATE 5 MG/1
10 TABLET ORAL DAILY
Qty: 90 TABLET | Refills: 0 | Status: SHIPPED | OUTPATIENT
Start: 2022-08-10 | End: 2022-11-29

## 2022-08-10 RX ORDER — SODIUM CHLORIDE 9 MG/ML
250 INJECTION, SOLUTION INTRAVENOUS ONCE AS NEEDED
Status: DISCONTINUED | OUTPATIENT
Start: 2022-08-10 | End: 2022-08-10 | Stop reason: HOSPADM

## 2022-08-10 RX ORDER — FENTANYL CITRATE 50 UG/ML
INJECTION, SOLUTION INTRAMUSCULAR; INTRAVENOUS AS NEEDED
Status: DISCONTINUED | OUTPATIENT
Start: 2022-08-10 | End: 2022-08-10 | Stop reason: HOSPADM

## 2022-08-10 NOTE — DISCHARGE INSTRUCTIONS
Post Cath Instructions      Call 's office to schedule a follow up appointment in 2-4 weeks at [phone]      Drink plenty of fluids for the next 24 hours.  This helps to eliminate the dye used in your procedure through urination.  You may resume a normal diet; however, try to avoid foods that would cause gas or constipation.    Sedative medication given to you during your catheterization may decrease your judgement and reaction time for up to 24-48 hours.  Therefore:  DO NOT drive or operate hazardous machinery (48 hours)  DO NOT consume alcoholic beverages  DO NOT make any important/legal decisions  Have someone stay with you for at least 24 hours    To allow proper healing and prevent bleeding, the following activities are to be strictly avoided for the next 24-48 hours:  Excessive bending at wound site  Straining (anything that would tense up muscles around the affected puncture site)  Lifting objects greater than 5 pounds, pushing, or pulling for 5 days    For Groin Cases:  Refrain from sexual activity  Refrain from running or vigorous walking  No prolonged sitting or standing  Limit stair climbing as much as possible    Keep the puncture site clean and dry.  You may remove the dressing tomorrow and replace it with a band-aid for at least one additional day.  Gently clean the site with mild soap and water.  No scrubbing/rubbing and lightly pat the area dry.  Showers are acceptable; however, avoid submerging in water (tub baths, hot tubs, swimming pools, dishwater, etc…) for at least one week.  The site should be completely healed before resuming these activities to reduce the risk of infection.  Check the site often.  Watch for signs and symptoms of infection and notify your physician if any of the following occur:  Bleeding or an increase in swelling at the puncture site  Fever  Increased soreness around puncture site  Foul odor or significant drainage from the puncture site  Swelling, redness, or  warmth at the puncture site    **A bruise or small “pea sized” lump under the skin at the puncture site is not unusual.  This should disappear within 3-4 weeks.**  CONTACT YOUR PHYSICIAN OR CALL 911 IF YOU EXPERIENCE ANY OF THE FOLLOWING:  Increased angina (chest pain) or frequent sensations of pressure, burning, pain, or other discomfort in the chest, arm, jaws, or stomach  Lightheadedness, dizziness, faint feeling, sweating, or difficulty breathing  Odd sensation changes like numbness, tingling, coldness, or pain in the arm or leg in which the catheter was inserted  Limb in which the catheter was inserted becomes pale/bluish in color    IMPORTANT:  Although this occurs very rarely, if you should develop bright red or excessive bleeding, feel a “pop” inside at the insertion site, or notice a sudden increase in swelling larger than a walnut, you should call 911.  Hold continuous firm pressure to the access site until emergency personnel arrive.  It is best if someone else can do this for you.

## 2022-08-10 NOTE — PROGRESS NOTES
"Cardiology Clinic Note  Paul White MD, PhD    Subjective:     Encounter Date:08/02/2022      Patient ID: Tye Avilez is a 71 y.o. male.    Chief Complaint:  Chief Complaint   Patient presents with   • Follow-up   • Coronary Artery Disease       HPI:    I had the pleasure to see this 71-year-old in clinic today.  He has history of bypass surgery remotely 1998, PCI SVG to PLV branch 2011, hypertensive heart disease hyperlipidemia diabetes.  He has been followed by cardiology every 6 months to a year since this time with various evaluations.  He presents today complaining of substernal chest discomfort dyspnea on exertion increasing in frequency severity and duration of the last few weeks to months.  He is hypertensive today in the 170 systolic range despite taking his antihypertensives.  He is on aspirin and Plavix in addition to losartan and amlodipine gemfibrozil and diabetes medicines.  No sublingual nitroglycerin use recently but he is having more exertional symptoms    Review of systems otherwise negative x14 point review of systems except was mentioned above    Historical data copied forward from previous encounters in EMR including the history, exam, and assessment/plan has been reviewed and is unchanged unless noted otherwise.    Cardiac medicines reviewed with risk, benefits, and necessity of each discussed.    Risk and benefit of cardiac testing reviewed including death heart attack stroke pain bleeding infection need for vascular /cardiovascular surgery were discussed and the patient     Objective:         /80 (BP Location: Left arm, Patient Position: Sitting)   Pulse 58   Resp 18   Ht 167.6 cm (66\")   Wt 88.2 kg (194 lb 6.4 oz)   BMI 31.38 kg/m²     Physical Exam  Regular rate and rhythm no rubs gallops heave or lift  1/6 systolic ejection murmur  No clubbing cyanosis significant edema  Tach grossly  Clear to auscultation    Assessment:         Diagnoses and all orders for this " visit:    1. Coronary artery disease of native artery of native heart with stable angina pectoris (HCC) (Primary)  -     Case Request Cath Lab: Left Heart Cath  -     CBC & Differential  -     Comprehensive Metabolic Panel  -     Protime-INR  -     COVID PRE-OP / PRE-PROCEDURE SCREENING ORDER (NO ISOLATION) - Swab, Nasopharynx; Future    2. Pre-op testing  -     CBC & Differential  -     Comprehensive Metabolic Panel  -     Protime-INR  -     COVID PRE-OP / PRE-PROCEDURE SCREENING ORDER (NO ISOLATION) - Swab, Nasopharynx; Future    Other orders  -     clopidogrel (PLAVIX) 75 MG tablet; Take 1 tablet by mouth Daily.  Dispense: 30 tablet; Refill: 5    Known coronary disease with concerning symptoms for angina  Start Plavix daily in addition to aspirin    Will schedule after catheterization    Recent benefits discussed with the patient to death heart attack stroke pain bleeding neck infection need for vascular cardiovascular surgery    The pleasure to be involved in this patient's cardiovascular care.  Please call with any questions or concerns  Paul White MD, PhD    Most recent EKG as reviewed and interpreted by me:  Procedures     Most recent echo as reviewed and interpreted by me:      Most recent stress test as reviewed and interpreted by me:  Results for orders placed during the hospital encounter of 07/08/19    Stress test with myocardial perfusion one day    Interpretation Summary  · Moderate risk for ischemic heart disease.  · Findings consistent with an abnormal ECG stress test.  · Diaphragmatic attenuation artifact is present.  · (Calculated EF = 46%).  · Impressions are consistent with an intermediate risk study.  · There is no prior study available for comparison. Fixed inferior wall defect worse at rest compared to post exercise consistent with attenuation artifact Generalized hypokinesis without segmental wall motion abnormality; LVEF 46%.    Stress portion of this exam was supervised by: Emma  STEPHANIE Rendon    No  reversible ischemic defect though inferior wall abnormality most likely due to attenuation artifact.,  Cannot entirely exclude previous inferior wall scar.      Most recent cardiac catheterization as reviewed interpreted by me:  No results found for this or any previous visit.    The following portions of the patient's history were reviewed and updated as appropriate: allergies, current medications, past family history, past medical history, past social history, past surgical history and problem list.      ROS:  14 point review of systems negative except as mentioned above  No current outpatient medications on file.    Problem List:  Patient Active Problem List   Diagnosis   • Bilateral carotid artery stenosis   • Bradycardia   • Coronary artery disease   • Status post coronary artery bypass graft   • Essential hypertension   • Hyperlipidemia, mixed     Past Medical History:  Past Medical History:   Diagnosis Date   • Coronary artery disease    • Hyperlipidemia    • Hypertension      Past Surgical History:  Past Surgical History:   Procedure Laterality Date   • CARDIAC CATHETERIZATION         • CAROTID STENT      PCI  to SVG supplying the LC/OM   • CORONARY ARTERY BYPASS GRAFT           Social History:  Social History     Socioeconomic History   • Marital status:    Tobacco Use   • Smoking status: Former Smoker     Packs/day: 1.00     Years: 25.00     Pack years: 25.00     Quit date:      Years since quittin.6   • Smokeless tobacco: Never Used   Vaping Use   • Vaping Use: Never used   Substance and Sexual Activity   • Alcohol use: Never   • Drug use: Never   • Sexual activity: Defer     Allergies:  Allergies   Allergen Reactions   • Statins Cough     Severe muscle aches/spasms     Immunizations:  Immunization History   Administered Date(s) Administered   • Pneumococcal Polysaccharide (PPSV23) 2017   • TD Preservative Free 2015            In-Office  Procedure(s):  No orders to display        ASCVD RIsk Score::  The ASCVD Risk score (Nicole MARKHAM Jr., et al., 2013) failed to calculate for the following reasons:    Cannot find a previous HDL lab    Cannot find a previous total cholesterol lab    Imaging:                 Lab Review:   Office Visit on 08/02/2022   Component Date Value   • Glucose 08/08/2022 126 (A)   • BUN 08/08/2022 17    • Creatinine 08/08/2022 0.82    • Sodium 08/08/2022 139    • Potassium 08/08/2022 4.7    • Chloride 08/08/2022 105    • CO2 08/08/2022 24.1    • Calcium 08/08/2022 9.4    • Total Protein 08/08/2022 7.3    • Albumin 08/08/2022 4.30    • ALT (SGPT) 08/08/2022 34    • AST (SGOT) 08/08/2022 25    • Alkaline Phosphatase 08/08/2022 42    • Total Bilirubin 08/08/2022 <0.2    • Globulin 08/08/2022 3.0    • A/G Ratio 08/08/2022 1.4    • BUN/Creatinine Ratio 08/08/2022 20.7    • Anion Gap 08/08/2022 9.9    • eGFR 08/08/2022 93.3    • Protime 08/08/2022 10.4    • INR 08/08/2022 1.01    • WBC 08/08/2022 9.60    • RBC 08/08/2022 4.37    • Hemoglobin 08/08/2022 12.8 (A)   • Hematocrit 08/08/2022 38.5    • MCV 08/08/2022 88.1    • MCH 08/08/2022 29.3    • MCHC 08/08/2022 33.2    • RDW 08/08/2022 13.3    • RDW-SD 08/08/2022 43.0    • MPV 08/08/2022 10.9    • Platelets 08/08/2022 302    • Neutrophil % 08/08/2022 51.6    • Lymphocyte % 08/08/2022 33.8    • Monocyte % 08/08/2022 10.5    • Eosinophil % 08/08/2022 2.8    • Basophil % 08/08/2022 0.9    • Immature Grans % 08/08/2022 0.4    • Neutrophils, Absolute 08/08/2022 4.95    • Lymphocytes, Absolute 08/08/2022 3.24 (A)   • Monocytes, Absolute 08/08/2022 1.01 (A)   • Eosinophils, Absolute 08/08/2022 0.27    • Basophils, Absolute 08/08/2022 0.09    • Immature Grans, Absolute 08/08/2022 0.04    • nRBC 08/08/2022 0.0      Recent labs reviewed and interpreted for clinical significance and application            Level of Care:           Paul White MD  08/10/22  .

## 2022-08-16 RX ORDER — LOSARTAN POTASSIUM 100 MG/1
TABLET ORAL
Qty: 90 TABLET | Refills: 0 | Status: SHIPPED | OUTPATIENT
Start: 2022-08-16 | End: 2022-09-06 | Stop reason: SDUPTHER

## 2022-09-02 PROBLEM — E11.9 TYPE 2 DIABETES MELLITUS: Status: ACTIVE | Noted: 2022-09-02

## 2022-09-02 PROBLEM — Z87.01 H/O: PNEUMONIA: Status: ACTIVE | Noted: 2022-09-02

## 2022-09-02 PROBLEM — I20.9 ANGINA PECTORIS: Status: ACTIVE | Noted: 2022-09-02

## 2022-09-02 PROBLEM — A80.9 ACUTE POLIOMYELITIS: Status: ACTIVE | Noted: 2022-09-02

## 2022-09-02 PROBLEM — R60.9 BODY FLUID RETENTION: Status: ACTIVE | Noted: 2022-09-02

## 2022-09-02 PROBLEM — Z98.890 HISTORY OF CEA (CAROTID ENDARTERECTOMY): Status: ACTIVE | Noted: 2022-09-02

## 2022-09-02 RX ORDER — METFORMIN HYDROCHLORIDE 500 MG/1
TABLET, EXTENDED RELEASE ORAL
COMMUNITY
Start: 2022-08-05

## 2022-09-06 ENCOUNTER — OFFICE VISIT (OUTPATIENT)
Dept: CARDIOLOGY | Facility: CLINIC | Age: 72
End: 2022-09-06

## 2022-09-06 VITALS
WEIGHT: 199 LBS | OXYGEN SATURATION: 95 % | BODY MASS INDEX: 33.15 KG/M2 | DIASTOLIC BLOOD PRESSURE: 76 MMHG | SYSTOLIC BLOOD PRESSURE: 120 MMHG | HEIGHT: 65 IN | HEART RATE: 66 BPM

## 2022-09-06 DIAGNOSIS — I25.118 CORONARY ARTERY DISEASE OF NATIVE ARTERY OF NATIVE HEART WITH STABLE ANGINA PECTORIS: Primary | ICD-10-CM

## 2022-09-06 DIAGNOSIS — E78.2 HYPERLIPIDEMIA, MIXED: ICD-10-CM

## 2022-09-06 DIAGNOSIS — I10 ESSENTIAL HYPERTENSION: ICD-10-CM

## 2022-09-06 DIAGNOSIS — I20.9 ANGINA PECTORIS: ICD-10-CM

## 2022-09-06 PROCEDURE — 93000 ELECTROCARDIOGRAM COMPLETE: CPT | Performed by: NURSE PRACTITIONER

## 2022-09-06 PROCEDURE — 99214 OFFICE O/P EST MOD 30 MIN: CPT | Performed by: NURSE PRACTITIONER

## 2022-09-06 RX ORDER — NITROGLYCERIN 0.4 MG/1
0.4 TABLET SUBLINGUAL
Qty: 100 TABLET | Refills: 3 | Status: SHIPPED | OUTPATIENT
Start: 2022-09-06

## 2022-09-10 NOTE — PROGRESS NOTES
Cardiology Office Follow Up Visit      Primary Care Provider:  Jodie Osborne APRN    Reason for f/u:     Coronary artery disease  Chest pain  Hypertension  Dyslipidemia  Diabetes      Subjective     CC:    Reports periods of tightness in his chest associated with exertion.    History of Present Illness       Tye Avilez is a 71 y.o. male.  Patient is a pleasant 71-year-old male who is known to have coronary artery disease.  Patient had remote coronary artery bypass grafting surgery in 1998.  Post CABG she underwent angioplasty with stenting to the saphenous vein graft to the posterior lateral marginal branch in 2011.    Patient is also known to have a history of hypertension, dyslipidemia, diabetes and previous polio.    Patient recently been having increasing complaints of chest pain.  He was seen by Dr. White and recommended to have cardiac catheterization.  Cardiac catheterization showed anastomotic narrowing of the saphenous vein graft to the obtuse marginal branch that did not appear critical but does have an extremely large myocardial supply of the entire lateral wall.    Post catheterization his antianginal therapy was advanced including adding amlodipine, starting Imdur.    Dr. White recommended the patient returning for a Lexiscan Myoview within 2 to 4 weeks to evaluate regional ischemia of the lateral wall and if present will reevaluate strategy for possible revascularization once his blood pressure is better controlled.    Since his hospital discharge the patient has had continued episodes of chest pain and tightness across his chest associated with exertion.  It is relieved with rest.              ASSESSMENT/PLAN:      Diagnoses and all orders for this visit:    1. Coronary artery disease of native artery of native heart with stable angina pectoris (HCC) (Primary)  -     Stress Test With Myocardial Perfusion (1 Day); Future    2. Angina pectoris (HCC)    3. Essential hypertension    4.  Hyperlipidemia, mixed    Other orders  -     nitroglycerin (NITROSTAT) 0.4 MG SL tablet; Place 1 tablet under the tongue Every 5 (Five) Minutes As Needed for Chest Pain. Prn chest pain  Dispense: 100 tablet; Refill: 3            MEDICAL DECISION MAKING:      Patient is here today for hospital follow-up.  Recent cardiac catheterization was reviewed.  He does have anastomotic narrowing of the saphenous vein graft to the obtuse marginal branch that did not appear critical but does have an extremely large myocardial supply of the entire lateral wall.    His primary cardiologist Dr. White is recommended Lexiscan Myoview to assess for any regional ischemia in this distribution once blood pressure is well controlled and he is maximized on antianginal therapy.    Patient's blood pressure is well controlled in the office today 120/76.  He is having persistent episodes of chest tightness with exertion.  We will proceed with Lexiscelia Myoview per Dr. White's recommendations.    The patient has any chest pain that is severe or unrelieved with nitroglycerin he has been advised to come to the emergency room.      Past Medical History:   Diagnosis Date   • Coronary artery disease    • Hyperlipidemia    • Hypertension        Past Surgical History:   Procedure Laterality Date   • CARDIAC CATHETERIZATION      2015   • CARDIAC CATHETERIZATION N/A 8/10/2022    Procedure: Left Heart Cath;  Surgeon: Paul White MD;  Location: [facility] CATH INVASIVE LOCATION;  Service: Cardiology;  Laterality: N/A;   • CAROTID STENT  2015    PCI  to SVG supplying the LC/OM   • CORONARY ARTERY BYPASS GRAFT      1998         Current Outpatient Medications:   •  amLODIPine (NORVASC) 5 MG tablet, Take 2 tablets by mouth Daily., Disp: 90 tablet, Rfl: 0  •  aspirin 81 MG tablet, Take 81 mg by mouth Daily., Disp: , Rfl:   •  clopidogrel (PLAVIX) 75 MG tablet, Take 1 tablet by mouth Daily., Disp: 30 tablet, Rfl: 5  •  gemfibrozil (LOPID) 600 MG tablet,  Take 1 tablet by mouth once daily, Disp: 90 tablet, Rfl: 0  •  glipiZIDE (GLUCOTROL) 5 MG tablet, 2.5 mg Daily., Disp: , Rfl:   •  isosorbide mononitrate (IMDUR) 30 MG 24 hr tablet, Take 1 tablet by mouth Daily., Disp: 90 tablet, Rfl: 3  •  metFORMIN (GLUCOPHAGE) 500 MG tablet, Take 1,500 mg by mouth See Admin Instructions. take 2 tablets by mouth in the morning and 1 in the evening, Disp: , Rfl: 0  •  metFORMIN ER (GLUCOPHAGE-XR) 500 MG 24 hr tablet, , Disp: , Rfl:   •  nitroglycerin (NITROSTAT) 0.4 MG SL tablet, Place 1 tablet under the tongue Every 5 (Five) Minutes As Needed for Chest Pain. Prn chest pain, Disp: 100 tablet, Rfl: 3    Social History     Socioeconomic History   • Marital status:    Tobacco Use   • Smoking status: Former Smoker     Packs/day: 1.00     Years: 25.00     Pack years: 25.00     Quit date:      Years since quittin.7   • Smokeless tobacco: Never Used   Vaping Use   • Vaping Use: Never used   Substance and Sexual Activity   • Alcohol use: Never   • Drug use: Never   • Sexual activity: Defer       Family History   Problem Relation Age of Onset   • Heart disease Mother        The following portions of the patient's history were reviewed and updated as appropriate: allergies, current medications, past family history, past medical history, past social history, past surgical history and problem list.    Review of Systems   Constitutional: Negative for decreased appetite and diaphoresis.   HENT: Negative for congestion, hearing loss and nosebleeds.    Cardiovascular: Positive for chest pain and dyspnea on exertion. Negative for claudication, irregular heartbeat, leg swelling, near-syncope, orthopnea, palpitations, paroxysmal nocturnal dyspnea and syncope.   Respiratory: Negative for cough, shortness of breath and sleep disturbances due to breathing.    Endocrine: Negative for polyuria.   Hematologic/Lymphatic: Does not bruise/bleed easily.   Skin: Negative for itching and rash.  "  Musculoskeletal: Negative for back pain, muscle weakness and myalgias.   Gastrointestinal: Negative for abdominal pain, change in bowel habit and nausea.   Genitourinary: Negative for dysuria, flank pain, frequency and hesitancy.   Neurological: Negative for dizziness, tremors and weakness.   Psychiatric/Behavioral: Negative for altered mental status. The patient does not have insomnia.      /76   Pulse 66   Ht 165.4 cm (65.12\")   Wt 90.3 kg (199 lb)   SpO2 95%   BMI 33.00 kg/m² .  Objective     Vitals reviewed.   Constitutional:       General: Not in acute distress.     Appearance: Normal appearance. Well-developed.   Eyes:      Pupils: Pupils are equal, round, and reactive to light.   HENT:      Head: Normocephalic and atraumatic.   Neck:      Vascular: No JVD.   Pulmonary:      Effort: Pulmonary effort is normal.      Breath sounds: Normal breath sounds.   Cardiovascular:      Normal rate. Regular rhythm.   Pulses:     Intact distal pulses.   Edema:     Peripheral edema absent.   Abdominal:      General: There is no distension.      Palpations: Abdomen is soft.      Tenderness: There is no abdominal tenderness.   Musculoskeletal: Normal range of motion.      Cervical back: Normal range of motion and neck supple. Skin:     General: Skin is warm and dry.   Neurological:      Mental Status: Alert and oriented to person, place, and time.             ECG 12 Lead    Date/Time: 9/6/2022 9:49 AM  Performed by: Emma Rendon APRN  Authorized by: Emma Rendon APRN   Rhythm: sinus rhythm  Rate: normal  BPM: 66  Conduction: conduction normal  ST Segments: ST segments normal  T Waves: T waves normal  QRS axis: normal  Other findings: non-specific ST-T wave changes    Clinical impression: non-specific ECG            EKG ordered by and reviewed by me in office                      "

## 2022-09-14 ENCOUNTER — HOSPITAL ENCOUNTER (OUTPATIENT)
Dept: NUCLEAR MEDICINE | Facility: HOSPITAL | Age: 72
Discharge: HOME | End: 2022-09-14

## 2022-09-14 DIAGNOSIS — I25.118 CORONARY ARTERY DISEASE OF NATIVE ARTERY OF NATIVE HEART WITH STABLE ANGINA PECTORIS: ICD-10-CM

## 2022-09-14 PROCEDURE — A9502 TC99M TETROFOSMIN: HCPCS | Performed by: NURSE PRACTITIONER

## 2022-09-14 PROCEDURE — 93018 CV STRESS TEST I&R ONLY: CPT | Performed by: INTERNAL MEDICINE

## 2022-09-14 PROCEDURE — 78452 HT MUSCLE IMAGE SPECT MULT: CPT

## 2022-09-14 PROCEDURE — 93016 CV STRESS TEST SUPVJ ONLY: CPT | Performed by: INTERNAL MEDICINE

## 2022-09-14 PROCEDURE — 0 TECHNETIUM TETROFOSMIN KIT: Performed by: NURSE PRACTITIONER

## 2022-09-14 PROCEDURE — 93017 CV STRESS TEST TRACING ONLY: CPT

## 2022-09-14 PROCEDURE — 25010000002 REGADENOSON 0.4 MG/5ML SOLUTION: Performed by: NURSE PRACTITIONER

## 2022-09-14 PROCEDURE — 78452 HT MUSCLE IMAGE SPECT MULT: CPT | Performed by: INTERNAL MEDICINE

## 2022-09-14 RX ADMIN — TETROFOSMIN 1 DOSE: 1.38 INJECTION, POWDER, LYOPHILIZED, FOR SOLUTION INTRAVENOUS at 09:29

## 2022-09-14 RX ADMIN — REGADENOSON 0.4 MG: 0.08 INJECTION, SOLUTION INTRAVENOUS at 09:29

## 2022-09-14 RX ADMIN — TETROFOSMIN 1 DOSE: 1.38 INJECTION, POWDER, LYOPHILIZED, FOR SOLUTION INTRAVENOUS at 08:26

## 2022-09-16 RX ORDER — GEMFIBROZIL 600 MG/1
TABLET, FILM COATED ORAL
Qty: 90 TABLET | Refills: 0 | Status: SHIPPED | OUTPATIENT
Start: 2022-09-16 | End: 2023-01-30

## 2022-09-19 LAB
BH CV REST NUCLEAR ISOTOPE DOSE: 11.5 MCI
BH CV STRESS BP STAGE 1: NORMAL
BH CV STRESS BP STAGE 2: NORMAL
BH CV STRESS COMMENTS STAGE 1: NORMAL
BH CV STRESS COMMENTS STAGE 2: NORMAL
BH CV STRESS DOSE REGADENOSON STAGE 1: 0.4
BH CV STRESS DURATION MIN STAGE 1: 0
BH CV STRESS DURATION MIN STAGE 2: 4
BH CV STRESS DURATION SEC STAGE 1: 10
BH CV STRESS DURATION SEC STAGE 2: 0
BH CV STRESS HR STAGE 1: 57
BH CV STRESS HR STAGE 2: 87
BH CV STRESS NUCLEAR ISOTOPE DOSE: 33 MCI
BH CV STRESS PROTOCOL 1: NORMAL
BH CV STRESS RECOVERY BP: NORMAL MMHG
BH CV STRESS RECOVERY HR: 59 BPM
BH CV STRESS STAGE 1: 1
BH CV STRESS STAGE 2: 2
LV EF NUC BP: 44 %
MAXIMAL PREDICTED HEART RATE: 148 BPM
PERCENT MAX PREDICTED HR: 58.78 %
STRESS BASELINE BP: NORMAL MMHG
STRESS BASELINE HR: 57 BPM
STRESS PERCENT HR: 69 %
STRESS POST PEAK BP: NORMAL MMHG
STRESS POST PEAK HR: 87 BPM
STRESS TARGET HR: 126 BPM

## 2022-09-22 ENCOUNTER — OFFICE VISIT (OUTPATIENT)
Dept: CARDIOLOGY | Facility: CLINIC | Age: 72
End: 2022-09-22

## 2022-09-22 VITALS
BODY MASS INDEX: 32.65 KG/M2 | DIASTOLIC BLOOD PRESSURE: 69 MMHG | OXYGEN SATURATION: 96 % | HEIGHT: 65 IN | HEART RATE: 74 BPM | SYSTOLIC BLOOD PRESSURE: 167 MMHG | WEIGHT: 196 LBS

## 2022-09-22 DIAGNOSIS — I10 ESSENTIAL HYPERTENSION: ICD-10-CM

## 2022-09-22 DIAGNOSIS — E78.2 HYPERLIPIDEMIA, MIXED: ICD-10-CM

## 2022-09-22 DIAGNOSIS — I25.118 CORONARY ARTERY DISEASE OF NATIVE ARTERY OF NATIVE HEART WITH STABLE ANGINA PECTORIS: Primary | ICD-10-CM

## 2022-09-22 DIAGNOSIS — I20.9 ANGINA PECTORIS: ICD-10-CM

## 2022-09-22 DIAGNOSIS — Z01.818 PRE-OP TESTING: ICD-10-CM

## 2022-09-22 PROCEDURE — 99214 OFFICE O/P EST MOD 30 MIN: CPT | Performed by: INTERNAL MEDICINE

## 2022-09-22 NOTE — PROGRESS NOTES
Cardiology Clinic Note  Paul White MD, PhD    Subjective:     Encounter Date:09/22/2022      Patient ID: Tye Avilez is a 71 y.o. male.    Chief Complaint:  Chief Complaint   Patient presents with   • Follow-up     Discuss stress test   • Coronary Artery Disease   • Chest Pain       HPI:      I had the pleasure to see this 71-year-old in clinic today in follow-up.  He has history of bypass surgery remotely 1998, PCI to the distal portion of the saphenous vein graft to a bifurcating obtuse marginal branch 2011, hypertensive heart disease hyperlipidemia diabetes.  He has been followed by cardiology every 6 months to a year since this time with various evaluations.  He presented back to the clinic a couple of months ago complaining of substernal chest discomfort dyspnea on exertion increasing in frequency severity and duration. He is hypertensive today in the 170 systolic at that time , uncontrolled despite taking his antihypertensives.  He is on aspirin and Plavix in addition to losartan and amlodipine gemfibrozil and diabetes medicines.      Ultimately patient underwent invasive ischemic evaluation demonstrating widely patent LIMA to LAD, saphenous vein graft appears to have 50% aorto ostial takeoff narrowing but with adequate flow and no pressure diminution on catheter engagement, there is nonobstructive disease 30 to 40% diffusely throughout, the distal anastomotic portion of this graft has a stent with 40% in-stent restenosis however the distal edge has significant 70 to 80% stenosis at the anastomosis into a bifurcating obtuse marginal branch of which the superior limb has 90% stenosis most severely in the view, the inferior limb 70% and are both small caliber vessels 2 to 2.5 mm in diameter.  There is also retrograde perfusion through the ascending and retrograde limb of the obtuse marginal back to the circumflex proper which gives PLV branches along the inferolateral wall.  The left main is completely  occluded providing no collateral flow and he is completely dependent on his vein graft anastomosis for the lateral wall.  The RCA had nonobstructive disease proximally 40 to 50% maximally in the very small caliber vessel.  Diagonal got retrograde perfusion for LIMA to LAD which was widely patent again.  He has preserved EF at 60 to 65%.  Given difficulty with IFR and FFR measurements given small distal caliber size multiple runoff distributions ultimately he underwent nuclear perfusion study demonstrating reversibility of the lateral and inferolateral wall under stress conditions indicating significant contribution of distal anastomotic disease as well as superior and inferior limb proximal stenosis.    I saw him back in clinic today ultimately demonstrating these images.  It is a difficult location for percutaneous intervention from the SVG graft of which he had thromboembolic sequela after the initial stent was placed and was a difficult intervention by his description as he had a perioperative MI with PCI at that time to the distal portion of the graft.  After long discussion, demonstration of bifurcation as well as retrograde filling to the circumflex and PLV segments, we discussed possibility for retrograde approach for  revascularization of the left main into circumflex if possible rather than percutaneous approach from the SVG into the superior inferior limb with bifurcation stenting and jailing the retrograde branch which may not be optimal and would be small caliber with reduced longevity of the intervention potentially.    After long discussion we will refer him to [facility] for possible  revascularization or other recommendations that can be provided    Continues with CCS class II-III angina both exertional and nonexertional, NYHA class I, euvolemic appearing today      Changes in plan today  Refer to [facility] as above, possible CT revascularization left main into circumflex and  possibly native obtuse marginal PCI  We will optimize his antianginals, his blood pressures not optimally controlled  Start Coreg 3.125 twice daily  Continue amlodipine 10 daily as a single dose  Isosorbide mononitrate will increase to 60 daily  He is off ARB therapy which we may restart depending on blood pressures after starting carvedilol, his creatinine is normal 0.8 to with normal electrolytes       Review of systems otherwise negative x14 point review of systems except was mentioned above     Historical data copied forward from previous encounters in EMR including the history, exam, and assessment/plan has been reviewed and is unchanged unless noted otherwise.     Cardiac medicines reviewed with risk, benefits, and necessity of each discussed.     Risk and benefit of cardiac testing reviewed including death heart attack stroke pain bleeding infection need for vascular /cardiovascular surgery were discussed and the patient      Objective:         Objective          Vitals reviewed below      Physical Exam  Regular rate and rhythm no rubs gallops heave or lift  1/6 systolic ejection murmur unchanged  Normal pulses normal cap refill  No clubbing cyanosis significant edema  Intact grossly  Clear to auscultation  Normocephalic/atraumatic  No carotid bruits or JVD    Unchanged from prior encounter  Assessment:         Assessment          Treated medical conditions  Native and bypass coronary artery disease  Preserved EF  Essential hypertension not optimally controlled  Hyperlipidemia  Comorbidity of diabetes  Bilateral carotid disease historically status post remote endarterectomy     Known coronary disease with concerning symptoms for angina  Continue Plavix and aspirin  Amlodipine 10 daily  Increase nitrate to 60 daily, Imdur  Start Coreg 3.125 twice daily  May need additional afterload reduction with ARB therapy with losartan, goal blood pressure less than 130 systolic  Multiple issues with multiple different  "agents of statin therapy in the past, evaluate for Praluent or other PCSK9 inhibitor approval     Return to clinic 30 days  Refer to [facility]  Will get cath films, most recent stress report, note today for referral  Dr. Bower          Recent benefits discussed with the patient to death heart attack stroke pain bleeding neck infection need for vascular cardiovascular surgery     The pleasure to be involved in this patient's cardiovascular care.  Please call with any questions or concerns  Paul White MD, PhD      Historical data copied forward from previous encounters in EMR including the history, exam, and assessment/plan has been reviewed and is unchanged unless noted otherwise.    Cardiac medicines reviewed with risk, benefits, and necessity of each discussed.    Risk and benefit of cardiac testing reviewed including death heart attack stroke pain bleeding infection need for vascular /cardiovascular surgery were discussed and the patient     Objective:         /69   Pulse 74   Ht 165.4 cm (65.12\")   Wt 88.9 kg (196 lb)   SpO2 96%   BMI 32.50 kg/m²         The pleasure to be involved in this patient's cardiovascular care.  Please call with any questions or concerns  Paul White MD, PhD    Most recent EKG as reviewed and interpreted by me:  Procedures     Most recent echo as reviewed and interpreted by me:      Most recent stress test as reviewed and interpreted by me:  Results for orders placed during the hospital encounter of 09/14/22    Stress Test With Myocardial Perfusion (1 Day)    Interpretation Summary  · Left ventricular ejection fraction is mildly reduced. (Calculated EF = 44%).  · Myocardial perfusion imaging indicates a medium-sized, moderately severe area of ischemia located in the lateral wall.  · Impressions are consistent with a high risk study.  · Study demonstrates reversibility L4-5 segments of the myocardium mainly located in the lateral and inferolateral wall consistent " with ischemia EF by gated imaging 46 to 50% mildly reduced.  · Findings consistent with an equivocal ECG stress test.    Abnormal study  Stress ECG did not restarted heart rate, there were nonspecific changes at submaximal stress in the inferolateral leads not meeting diagnostic criteria for ST depression truly  No arrhythmias seen  Sinus rhythm  Nuclear imaging with moderate amount of reversibility indicative of ischemia in the lateral and inferolateral wall, EF 46% to 50% by gated imaging    Abnormal study  Lateral inferolateral ischemia s indicated by the present study,      Most recent cardiac catheterization as reviewed interpreted by me:  Results for orders placed during the hospital encounter of 08/10/22    Cardiac Catheterization/Vascular Study    Narrative   Paul White MD, PhD  [facility] cardiology  Date of service 8-    Procedure  1.  Left heart catheterization with coronary angiography of native and bypass grafts, LV gram    Indication  Recurrent anginal chest pain    After informed consent patient is brought to the Cath Lab sterilely prepped and draped in usual fashion expose the right groin for right common femoral arterial access via micropuncture modified Seldinger technique with placement of a 6 Setswana sheath.  035 guidewire was advanced aortic valve followed by diagnostic JL 4 and JR4 catheters for selective left and right coronary angiography as well as bypass graft with SVG to OM and LIMA to LAD.  JR4 was used across aortic valve followed by hand-injection for LV gram, EDP assessment and pullback assessment of the transaortic valve gradient.  All catheters equipment removed.  Sheath flushed with heparinized saline to be removed by manual pressure.  He left Cath Lab chest pain-free hemodynamically electrically stable and talking to staff neurologically grossly tight bilaterally    Findings  1 opening aortic pressure was 180/97  2.  Closing pressure was 162/87  3.   LVEDP 10-12  4.  LV function low normal 50 to 55% globally  5.  Normal transaortic valve gradient    Angiography  1 left main   Over 2 LAD proximally , perfused by widely patent LIMA to LAD with retrograde flow widely patent flow retrogradely to the diagonal branch across the anterolateral wall, there is no anastomotic disease, no downstream disease or even retrograde disease significantly with only mild diffuse luminal regularities, highly functional LIMA to LAD graft  3.  Circumflex ostial proximal , perfused by SVG to second obtuse marginal branch.  There is a patent stent in the proximal portion with ostial 50% narrowing in a very large caliber graft, there is copious contrast E flux surrounding the diagnostic catheter from the ostial portion of the graft into the aorta, there is diffuse luminal regularities throughout the graft, mild degeneration 20 to 30%, there is a patent stent to the distal portion of the graft just immediately prior to anastomosis there is otherwise widely patent.  Immediately after this stent terminates there is a short 60% to 70% narrowing at the anastomosis of the graft to the native coronary artery.,  There is retrograde flow from the anastomosis to the 2 obtuse marginal branches superior and inferior limbs as well as retrogradely to the circumflex as well as in the AV groove for continuation circumflex and L PLV branch.  This single graft supplies the entire lateral wall and inferolateral wall.,  There is no disease in the native circulation other than mild luminal irregularities  4.  RCA small, codominant, there is ostial 50% narrowing, diffuse atherosclerotic disease of 30 to 40% but no focal stenotic lesion or flow limitation, small vessel disease distally is seen but not amenable to intervention secondary to small caliber vessel size in the PLV and PDA    Patent SVG to obtuse marginal, patent stents in this as described, concern over anastomotic disease and narrowing  given the large myocardial supply of the entire lateral wall however there is FILIPE-3 flow antegrade and retrogradely,    Widely patent LIMA to LAD with no disease    Conclusions recommendations  1.  Anginal chest pain, uncontrolled blood pressure with stage II hypertension, today was 180 systolic on entering the Cath Lab, even while sleeping was above 165 systolic  2.  SVG to obtuse marginal branch has anastomotic narrowing but does not appear critical but does have a extremely large myocardial supply of the entire lateral wall    Intervention to the anastomosis of the SVG to obtuse marginal branch with jailed the retrograde limb back to the circumflex and to the inferolateral wall with L PLV segment in the AV groove which is sizable as well as likely the superior limb of the obtuse marginal, there is a high chance of diminishing graft functionality over time in this location as well as high chance of in-stent restenosis at this anastomotic location    We will discharge him today, advance his antianginals as well as his antihypertensives including his amlodipine, start Imdur.  We will bring him back in 2 to 4 weeks for Lexiscan stress to evaluate regional ischemia of the lateral wall if present will reevaluate best optimal strategy for lateral wall revascularization or control of his angina once blood pressure is also controlled    Patient is amenable for this conservative approach  He will be continued on aspirin and Plavix in addition to high intensity statin therapy, no beta-blocker secondary to heart rates in the 60s    Paul White MD, PhD    The following portions of the patient's history were reviewed and updated as appropriate: allergies, current medications, past family history, past medical history, past social history, past surgical history and problem list.      ROS:  14 point review of systems negative except as mentioned above    Current Outpatient Medications:   •  amLODIPine (NORVASC) 5 MG tablet,  Take 2 tablets by mouth Daily., Disp: 90 tablet, Rfl: 0  •  aspirin 81 MG tablet, Take 81 mg by mouth Daily., Disp: , Rfl:   •  clopidogrel (PLAVIX) 75 MG tablet, Take 1 tablet by mouth Daily., Disp: 30 tablet, Rfl: 5  •  gemfibrozil (LOPID) 600 MG tablet, Take 1 tablet by mouth once daily, Disp: 90 tablet, Rfl: 0  •  glipiZIDE (GLUCOTROL) 5 MG tablet, 2.5 mg Daily., Disp: , Rfl:   •  isosorbide mononitrate (IMDUR) 30 MG 24 hr tablet, Take 1 tablet by mouth Daily., Disp: 90 tablet, Rfl: 3  •  metFORMIN ER (GLUCOPHAGE-XR) 500 MG 24 hr tablet, , Disp: , Rfl:   •  nitroglycerin (NITROSTAT) 0.4 MG SL tablet, Place 1 tablet under the tongue Every 5 (Five) Minutes As Needed for Chest Pain. Prn chest pain, Disp: 100 tablet, Rfl: 3    Problem List:  Patient Active Problem List   Diagnosis   • Bilateral carotid artery stenosis   • Bradycardia   • Coronary artery disease   • Status post coronary artery bypass graft   • Essential hypertension   • Hyperlipidemia, mixed   • Type 2 diabetes mellitus (HCC)   • Body fluid retention   • Angina pectoris (HCC)   • Acute poliomyelitis   • H/O: pneumonia   • History of CEA (carotid endarterectomy)     Past Medical History:  Past Medical History:   Diagnosis Date   • Coronary artery disease    • Diabetes mellitus (HCC)    • Hyperlipidemia    • Hypertension      Past Surgical History:  Past Surgical History:   Procedure Laterality Date   • CARDIAC CATHETERIZATION      2015   • CARDIAC CATHETERIZATION N/A 08/10/2022    Procedure: Left Heart Cath;  Surgeon: Paul White MD;  Location: [facility] CATH INVASIVE LOCATION;  Service: Cardiology;  Laterality: N/A;   • CAROTID ENDARTERECTOMY     • CAROTID STENT  2015    PCI  to SVG supplying the LC/OM   • CORONARY ARTERY BYPASS GRAFT      1998   • CORONARY STENT PLACEMENT       Social History:  Social History     Socioeconomic History   • Marital status:    Tobacco Use   • Smoking status: Former Smoker     Packs/day: 1.00     Years:  25.00     Pack years: 25.00     Types: Cigarettes     Quit date: 1998     Years since quittin.7   • Smokeless tobacco: Never Used   Vaping Use   • Vaping Use: Never used   Substance and Sexual Activity   • Alcohol use: Never   • Drug use: Never   • Sexual activity: Yes     Partners: Female     Birth control/protection: Post-menopausal     Allergies:  Allergies   Allergen Reactions   • Statins Cough     Severe muscle aches/spasms     Immunizations:  Immunization History   Administered Date(s) Administered   • Pneumococcal Polysaccharide (PPSV23) 2017   • TD Preservative Free 2015            In-Office Procedure(s):  No orders to display        ASCVD RIsk Score::  The ASCVD Risk score (Nicole MARKHAM Jr., et al., 2013) failed to calculate for the following reasons:    Cannot find a previous HDL lab    Cannot find a previous total cholesterol lab    Imaging:                 Lab Review:   Hospital Outpatient Visit on 2022   Component Date Value   •  CV STRESS PROTOCOL 1 2022 Pharmacologic    • Stage 1 2022 1    • HR Stage 1 2022 57    • BP Stage 1 2022 180/68    • Duration Min Stage 1 2022 0    • Duration Sec Stage 1 2022 10    • Stress Dose Regadenoson * 2022 0.4    • Stress Comments Stage 1 2022 10 sec bolus injection    • Stage 2 2022 2    • HR Stage 2 2022 87    • BP Stage 2 2022 157/78    • Duration Min Stage 2 2022 4    • Duration Sec Stage 2 2022 0    • Stress Comments Stage 2 2022 recovery    • Baseline HR 2022 57    • Baseline BP 2022 180/68    • Peak HR 2022 87    • Percent Max Pred HR 2022 58.78    • Percent Target HR 2022 69    • Peak BP 2022 157/78    • Recovery HR 2022 59    • Recovery BP 2022 159/64    • Target HR (85%) 2022 126    • Max. Pred. HR (100%) 2022 148    • BH CV REST NUCLEAR ISOTO* 2022 11.5    • BH CV STRESS NUCLEAR ISO*  09/14/2022 33.0    • Nuc Stress EF 09/14/2022 44    Admission on 08/10/2022, Discharged on 08/10/2022   Component Date Value   • Glucose 08/10/2022 144 (A)   Lab on 08/08/2022   Component Date Value   • COVID19 08/08/2022 Not Detected    Office Visit on 08/02/2022   Component Date Value   • Glucose 08/08/2022 126 (A)   • BUN 08/08/2022 17    • Creatinine 08/08/2022 0.82    • Sodium 08/08/2022 139    • Potassium 08/08/2022 4.7    • Chloride 08/08/2022 105    • CO2 08/08/2022 24.1    • Calcium 08/08/2022 9.4    • Total Protein 08/08/2022 7.3    • Albumin 08/08/2022 4.30    • ALT (SGPT) 08/08/2022 34    • AST (SGOT) 08/08/2022 25    • Alkaline Phosphatase 08/08/2022 42    • Total Bilirubin 08/08/2022 <0.2    • Globulin 08/08/2022 3.0    • A/G Ratio 08/08/2022 1.4    • BUN/Creatinine Ratio 08/08/2022 20.7    • Anion Gap 08/08/2022 9.9    • eGFR 08/08/2022 93.3    • Protime 08/08/2022 10.4    • INR 08/08/2022 1.01    • WBC 08/08/2022 9.60    • RBC 08/08/2022 4.37    • Hemoglobin 08/08/2022 12.8 (A)   • Hematocrit 08/08/2022 38.5    • MCV 08/08/2022 88.1    • MCH 08/08/2022 29.3    • MCHC 08/08/2022 33.2    • RDW 08/08/2022 13.3    • RDW-SD 08/08/2022 43.0    • MPV 08/08/2022 10.9    • Platelets 08/08/2022 302    • Neutrophil % 08/08/2022 51.6    • Lymphocyte % 08/08/2022 33.8    • Monocyte % 08/08/2022 10.5    • Eosinophil % 08/08/2022 2.8    • Basophil % 08/08/2022 0.9    • Immature Grans % 08/08/2022 0.4    • Neutrophils, Absolute 08/08/2022 4.95    • Lymphocytes, Absolute 08/08/2022 3.24 (A)   • Monocytes, Absolute 08/08/2022 1.01 (A)   • Eosinophils, Absolute 08/08/2022 0.27    • Basophils, Absolute 08/08/2022 0.09    • Immature Grans, Absolute 08/08/2022 0.04    • nRBC 08/08/2022 0.0      Recent labs reviewed and interpreted for clinical significance and application            Level of Care:           Paul White MD  09/22/22  .

## 2022-09-23 RX ORDER — CARVEDILOL 3.12 MG/1
3.12 TABLET ORAL 2 TIMES DAILY
Qty: 60 TABLET | Refills: 3 | Status: SHIPPED | OUTPATIENT
Start: 2022-09-23 | End: 2022-10-26

## 2022-09-23 RX ORDER — ISOSORBIDE MONONITRATE 60 MG/1
60 TABLET, EXTENDED RELEASE ORAL EVERY MORNING
Qty: 30 TABLET | Refills: 3 | Status: SHIPPED | OUTPATIENT
Start: 2022-09-23 | End: 2022-12-15

## 2022-10-13 DIAGNOSIS — I25.118 CORONARY ARTERY DISEASE OF NATIVE ARTERY OF NATIVE HEART WITH STABLE ANGINA PECTORIS: Primary | ICD-10-CM

## 2022-10-27 ENCOUNTER — OFFICE VISIT (OUTPATIENT)
Dept: CARDIOLOGY | Facility: CLINIC | Age: 72
End: 2022-10-27

## 2022-10-27 VITALS
SYSTOLIC BLOOD PRESSURE: 170 MMHG | HEART RATE: 73 BPM | WEIGHT: 198 LBS | HEIGHT: 65 IN | OXYGEN SATURATION: 97 % | RESPIRATION RATE: 18 BRPM | DIASTOLIC BLOOD PRESSURE: 98 MMHG | BODY MASS INDEX: 32.99 KG/M2

## 2022-10-27 DIAGNOSIS — I10 ESSENTIAL HYPERTENSION: ICD-10-CM

## 2022-10-27 DIAGNOSIS — E78.2 HYPERLIPIDEMIA, MIXED: Primary | ICD-10-CM

## 2022-10-27 DIAGNOSIS — I25.118 CORONARY ARTERY DISEASE OF NATIVE ARTERY OF NATIVE HEART WITH STABLE ANGINA PECTORIS: ICD-10-CM

## 2022-10-27 DIAGNOSIS — I20.9 ANGINA PECTORIS: ICD-10-CM

## 2022-10-27 PROCEDURE — 99214 OFFICE O/P EST MOD 30 MIN: CPT | Performed by: INTERNAL MEDICINE

## 2022-10-27 NOTE — PROGRESS NOTES
Cardiology Clinic Note  Paul White MD, PhD    Subjective:     Encounter Date:10/27/2022      Patient ID: Tye Avilez is a 72 y.o. male.    Chief Complaint:  Chief Complaint   Patient presents with   • Heart Problem     Follow up       HPI:    Previously  I had the pleasure to see this 72-year-old in clinic today in follow-up.  He has history of bypass surgery remotely 1998, PCI to the distal portion of the saphenous vein graft to a bifurcating obtuse marginal branch 2011, hypertensive heart disease hyperlipidemia diabetes.  He has been followed by cardiology every 6 months to a year since this time with various evaluations.  He presented back to the clinic a few of months ago complaining of substernal chest discomfort dyspnea on exertion increasing in frequency severity and duration.  He is on aspirin and Plavix in addition to losartan and amlodipine gemfibrozil and diabetes medicines.    Blood pressure seems somewhat labile as low as 110 systolic as high as 160 systolic depending on varying activities.     Ultimately patient underwent invasive ischemic evaluation demonstrating widely patent MCNEILL to LAD, patent native RCA small caliber mild luminal regularities diffusely but otherwise patent with FILIPE-3 flow, saphenous vein graft to obtuse marginal appears to have 50% aorto ostial takeoff narrowing but with adequate flow and no pressure diminution on catheter engagement, there is nonobstructive disease 30 to 40% diffusely throughout, the distal anastomotic portion of this graft has a stent with 40% in-stent restenosis however the distal edge has significant 70 to 80 perhaps even 90 % stenosis at the anastomosis into a bifurcating obtuse marginal branch of which the superior limb has 90% stenosis most severely in the view, the inferior limb 70% and are both small caliber vessels 2 to 2.5 mm in diameter.  There is also retrograde perfusion through the ascending/ retrograde limb of the obtuse marginal back to the  circumflex which gives PLV branches along the inferolateral wall.  The left main is completely occluded providing no collateral flow and he is completely dependent on his vein graft anastomosis for the lateral wall.   Diagonal has retrograde perfusion for LIMA to LAD which was widely patent again.  He has preserved EF at 60 to 65%.  Given difficulty with IFR and FFR measurements given small distal caliber size multiple runoff distributions ultimately he underwent nuclear perfusion study demonstrating reversibility of the lateral and inferolateral wall under stress conditions indicating significant contribution of distal anastomotic disease as well as superior and inferior limb proximal stenosis.  Stress September 2020 demonstrated lateral and inferolateral wall ischemia indicating vein graft significantly stenosed with physiologic limitation of myocardial supply.     I saw him back in clinic today ultimately demonstrating these images.  It is a difficult location for percutaneous intervention from the SVG graft of which he had thromboembolic sequela after the initial stent was placed and was a difficult intervention by his description as he had a perioperative MI with PCI at that time to the distal portion of the graft.  After long discussion, demonstration of bifurcation as well as retrograde filling to the circumflex and PLV segments, we discussed possibility for retrograde approach for  revascularization of the left main into circumflex if possible rather than percutaneous approach from the SVG into the superior inferior limb with bifurcation stenting and jailing the retrograde branch which may not be optimal and would be small caliber with reduced longevity of the intervention potentially.    Options for redo bypass surgery, percutaneous  intervention to the left main into circumflex versus vein graft intervention are all possible, defer to [facility] for recommendations on referral as discussed with  "patient today     After long discussion we will refer him to [facility] for possible  revascularization or other recommendations that can be provided     Continues with CCS class II-III angina both exertional and nonexertional, NYHA class I, euvolemic appearing today        Changes in plan today  Refer to [facility] as above, possible CT revascularization left main into circumflex and possibly native obtuse marginal PCI versus vein graft intervention  We will optimize his antianginals, his blood pressures not optimally controlled  Continue Coreg 3.125 twice daily  Continue amlodipine 10 daily as a single dose  Isosorbide mononitrate continue to 60 daily  He is off ARB therapy which we may restart depending on blood pressures after starting carvedilol, his creatinine is normal 0.8 to with normal electrolytes      See him back in 3 months  Paul White MD, PhD        Historical data copied forward from previous encounters in EMR including the history, exam, and assessment/plan has been reviewed and is unchanged unless noted otherwise.    Cardiac medicines reviewed with risk, benefits, and necessity of each discussed.    Risk and benefit of cardiac testing reviewed including death heart attack stroke pain bleeding infection need for vascular /cardiovascular surgery were discussed and the patient     Objective:         /98 (BP Location: Left arm, Patient Position: Sitting, Cuff Size: Large Adult)   Pulse 73   Resp 18   Ht 165.1 cm (65\")   Wt 89.8 kg (198 lb)   SpO2 97%   BMI 32.95 kg/m²         The pleasure to be involved in this patient's cardiovascular care.  Please call with any questions or concerns  Paul White MD, PhD    Most recent EKG as reviewed and interpreted by me:  Procedures     Most recent echo as reviewed and interpreted by me:      Most recent stress test as reviewed and interpreted by me:  Results for orders placed during the hospital encounter of 09/14/22    Stress Test " With Myocardial Perfusion (1 Day)    Interpretation Summary  · Left ventricular ejection fraction is mildly reduced. (Calculated EF = 44%).  · Myocardial perfusion imaging indicates a medium-sized, moderately severe area of ischemia located in the lateral wall.  · Impressions are consistent with a high risk study.  · Study demonstrates reversibility L4-5 segments of the myocardium mainly located in the lateral and inferolateral wall consistent with ischemia EF by gated imaging 46 to 50% mildly reduced.  · Findings consistent with an equivocal ECG stress test.    Abnormal study  Stress ECG did not restarted heart rate, there were nonspecific changes at submaximal stress in the inferolateral leads not meeting diagnostic criteria for ST depression truly  No arrhythmias seen  Sinus rhythm  Nuclear imaging with moderate amount of reversibility indicative of ischemia in the lateral and inferolateral wall, EF 46% to 50% by gated imaging    Abnormal study  Lateral inferolateral ischemia s indicated by the present study,      Most recent cardiac catheterization as reviewed interpreted by me:  Results for orders placed during the hospital encounter of 08/10/22    Cardiac Catheterization/Vascular Study    Narrative   Paul White MD, PhD  [facility] cardiology  Date of service 8-    Procedure  1.  Left heart catheterization with coronary angiography of native and bypass grafts, LV gram    Indication  Recurrent anginal chest pain    After informed consent patient is brought to the Cath Lab sterilely prepped and draped in usual fashion expose the right groin for right common femoral arterial access via micropuncture modified Seldinger technique with placement of a 6 [place] sheath.  035 guidewire was advanced aortic valve followed by diagnostic JL 4 and JR4 catheters for selective left and right coronary angiography as well as bypass graft with SVG to OM and LIMA to LAD.  JR4 was used across aortic  valve followed by hand-injection for LV gram, EDP assessment and pullback assessment of the transaortic valve gradient.  All catheters equipment removed.  Sheath flushed with heparinized saline to be removed by manual pressure.  He left Cath Lab chest pain-free hemodynamically electrically stable and talking to staff neurologically grossly tight bilaterally    Findings  1 opening aortic pressure was 180/97  2.  Closing pressure was 162/87  3.  LVEDP 10-12  4.  LV function low normal 50 to 55% globally  5.  Normal transaortic valve gradient    Angiography  1 left main   Over 2 LAD proximally , perfused by widely patent LIMA to LAD with retrograde flow widely patent flow retrogradely to the diagonal branch across the anterolateral wall, there is no anastomotic disease, no downstream disease or even retrograde disease significantly with only mild diffuse luminal regularities, highly functional LIMA to LAD graft  3.  Circumflex ostial proximal , perfused by SVG to second obtuse marginal branch.  There is a patent stent in the proximal portion with ostial 50% narrowing in a very large caliber graft, there is copious contrast E flux surrounding the diagnostic catheter from the ostial portion of the graft into the aorta, there is diffuse luminal regularities throughout the graft, mild degeneration 20 to 30%, there is a patent stent to the distal portion of the graft just immediately prior to anastomosis there is otherwise widely patent.  Immediately after this stent terminates there is a short 60% to 70% narrowing at the anastomosis of the graft to the native coronary artery.,  There is retrograde flow from the anastomosis to the 2 obtuse marginal branches superior and inferior limbs as well as retrogradely to the circumflex as well as in the AV groove for continuation circumflex and L PLV branch.  This single graft supplies the entire lateral wall and inferolateral wall.,  There is no disease in the native  circulation other than mild luminal irregularities  4.  RCA small, codominant, there is ostial 50% narrowing, diffuse atherosclerotic disease of 30 to 40% but no focal stenotic lesion or flow limitation, small vessel disease distally is seen but not amenable to intervention secondary to small caliber vessel size in the PLV and PDA    Patent SVG to obtuse marginal, patent stents in this as described, concern over anastomotic disease and narrowing given the large myocardial supply of the entire lateral wall however there is FILIPE-3 flow antegrade and retrogradely,    Widely patent LIMA to LAD with no disease    Conclusions recommendations  1.  Anginal chest pain, uncontrolled blood pressure with stage II hypertension, today was 180 systolic on entering the Cath Lab, even while sleeping was above 165 systolic  2.  SVG to obtuse marginal branch has anastomotic narrowing but does not appear critical but does have a extremely large myocardial supply of the entire lateral wall    Intervention to the anastomosis of the SVG to obtuse marginal branch with jailed the retrograde limb back to the circumflex and to the inferolateral wall with L PLV segment in the AV groove which is sizable as well as likely the superior limb of the obtuse marginal, there is a high chance of diminishing graft functionality over time in this location as well as high chance of in-stent restenosis at this anastomotic location    We will discharge him today, advance his antianginals as well as his antihypertensives including his amlodipine, start Imdur.  We will bring him back in 2 to 4 weeks for Lexiscan stress to evaluate regional ischemia of the lateral wall if present will reevaluate best optimal strategy for lateral wall revascularization or control of his angina once blood pressure is also controlled    Patient is amenable for this conservative approach  He will be continued on aspirin and Plavix in addition to high intensity statin therapy, no  beta-blocker secondary to heart rates in the 60s    Paul White MD, PhD    The following portions of the patient's history were reviewed and updated as appropriate: allergies, current medications, past family history, past medical history, past social history, past surgical history and problem list.      ROS:  14 point review of systems negative except as mentioned above    Current Outpatient Medications:   •  amLODIPine (NORVASC) 5 MG tablet, Take 2 tablets by mouth Daily., Disp: 90 tablet, Rfl: 0  •  aspirin 81 MG tablet, Take 81 mg by mouth Daily., Disp: , Rfl:   •  clopidogrel (PLAVIX) 75 MG tablet, Take 1 tablet by mouth Daily., Disp: 30 tablet, Rfl: 5  •  gemfibrozil (LOPID) 600 MG tablet, Take 1 tablet by mouth once daily, Disp: 90 tablet, Rfl: 0  •  glipiZIDE (GLUCOTROL) 5 MG tablet, 2.5 mg Daily., Disp: , Rfl:   •  metFORMIN ER (GLUCOPHAGE-XR) 500 MG 24 hr tablet, , Disp: , Rfl:   •  nitroglycerin (NITROSTAT) 0.4 MG SL tablet, Place 1 tablet under the tongue Every 5 (Five) Minutes As Needed for Chest Pain. Prn chest pain, Disp: 100 tablet, Rfl: 3  •  isosorbide mononitrate (IMDUR) 60 MG 24 hr tablet, Take 1 tablet by mouth Every Morning., Disp: 30 tablet, Rfl: 3    Problem List:  Patient Active Problem List   Diagnosis   • Bilateral carotid artery stenosis   • Bradycardia   • Coronary artery disease   • Status post coronary artery bypass graft   • Essential hypertension   • Hyperlipidemia, mixed   • Type 2 diabetes mellitus (HCC)   • Body fluid retention   • Angina pectoris (HCC)   • Acute poliomyelitis   • H/O: pneumonia   • History of CEA (carotid endarterectomy)     Past Medical History:  Past Medical History:   Diagnosis Date   • Coronary artery disease    • Diabetes mellitus (HCC)    • Hyperlipidemia    • Hypertension      Past Surgical History:  Past Surgical History:   Procedure Laterality Date   • CARDIAC CATHETERIZATION      2015   • CARDIAC CATHETERIZATION N/A 08/10/2022    Procedure: Left  Heart Cath;  Surgeon: Paul White MD;  Location:  [facility];  Service: Cardiology;  Laterality: N/A;   • CAROTID ENDARTERECTOMY     • CAROTID STENT      PCI  to SVG supplying the LC/OM   • CORONARY ARTERY BYPASS GRAFT         • CORONARY STENT PLACEMENT       Social History:  Social History     Socioeconomic History   • Marital status:    Tobacco Use   • Smoking status: Former     Packs/day: 1.00     Years: 25.00     Pack years: 25.00     Types: Cigarettes     Quit date: 1998     Years since quittin.8   • Smokeless tobacco: Never   Vaping Use   • Vaping Use: Never used   Substance and Sexual Activity   • Alcohol use: Never   • Drug use: Never   • Sexual activity: Yes     Partners: Female     Birth control/protection: Post-menopausal     Allergies:  Allergies   Allergen Reactions   • Statins Cough     Severe muscle aches/spasms     Immunizations:  Immunization History   Administered Date(s) Administered   • Pneumococcal Polysaccharide (PPSV23) 2017   • TD Preservative Free 2015            In-Office Procedure(s):  No orders to display        ASCVD RIsk Score::  The ASCVD Risk score (Timo DK, et al., 2019) failed to calculate for the following reasons:    Cannot find a previous HDL lab    Cannot find a previous total cholesterol lab    Imaging:                 Lab Review:   Hospital Outpatient Visit on 2022   Component Date Value   •  CV STRESS PROTOCOL 1 2022 Pharmacologic    • Stage 1 2022 1    • HR Stage 1 2022 57    • BP Stage 1 2022 180/68    • Duration Min Stage 1 2022 0    • Duration Sec Stage 1 2022 10    • Stress Dose Regadenoson * 2022 0.4    • Stress Comments Stage 1 2022 10 sec bolus injection    • Stage 2 2022 2    • HR Stage 2 2022 87    • BP Stage 2 2022 157/78    • Duration Min Stage 2 2022 4    • Duration Sec Stage 2 2022 0    • Stress Comments Stage  2 09/14/2022 recovery    • Baseline HR 09/14/2022 57    • Baseline BP 09/14/2022 180/68    • Peak HR 09/14/2022 87    • Percent Max Pred HR 09/14/2022 58.78    • Percent Target HR 09/14/2022 69    • Peak BP 09/14/2022 157/78    • Recovery HR 09/14/2022 59    • Recovery BP 09/14/2022 159/64    • Target HR (85%) 09/14/2022 126    • Max. Pred. HR (100%) 09/14/2022 148    • BH CV REST NUCLEAR ISOTO* 09/14/2022 11.5    • BH CV STRESS NUCLEAR ISO* 09/14/2022 33.0    • Nuc Stress EF 09/14/2022 44    Admission on 08/10/2022, Discharged on 08/10/2022   Component Date Value   • Glucose 08/10/2022 144 (H)    Lab on 08/08/2022   Component Date Value   • COVID19 08/08/2022 Not Detected    Office Visit on 08/02/2022   Component Date Value   • Glucose 08/08/2022 126 (H)    • BUN 08/08/2022 17    • Creatinine 08/08/2022 0.82    • Sodium 08/08/2022 139    • Potassium 08/08/2022 4.7    • Chloride 08/08/2022 105    • CO2 08/08/2022 24.1    • Calcium 08/08/2022 9.4    • Total Protein 08/08/2022 7.3    • Albumin 08/08/2022 4.30    • ALT (SGPT) 08/08/2022 34    • AST (SGOT) 08/08/2022 25    • Alkaline Phosphatase 08/08/2022 42    • Total Bilirubin 08/08/2022 <0.2    • Globulin 08/08/2022 3.0    • A/G Ratio 08/08/2022 1.4    • BUN/Creatinine Ratio 08/08/2022 20.7    • Anion Gap 08/08/2022 9.9    • eGFR 08/08/2022 93.3    • Protime 08/08/2022 10.4    • INR 08/08/2022 1.01    • WBC 08/08/2022 9.60    • RBC 08/08/2022 4.37    • Hemoglobin 08/08/2022 12.8 (L)    • Hematocrit 08/08/2022 38.5    • MCV 08/08/2022 88.1    • MCH 08/08/2022 29.3    • MCHC 08/08/2022 33.2    • RDW 08/08/2022 13.3    • RDW-SD 08/08/2022 43.0    • MPV 08/08/2022 10.9    • Platelets 08/08/2022 302    • Neutrophil % 08/08/2022 51.6    • Lymphocyte % 08/08/2022 33.8    • Monocyte % 08/08/2022 10.5    • Eosinophil % 08/08/2022 2.8    • Basophil % 08/08/2022 0.9    • Immature Grans % 08/08/2022 0.4    • Neutrophils, Absolute 08/08/2022 4.95    • Lymphocytes, Absolute  08/08/2022 3.24 (H)    • Monocytes, Absolute 08/08/2022 1.01 (H)    • Eosinophils, Absolute 08/08/2022 0.27    • Basophils, Absolute 08/08/2022 0.09    • Immature Grans, Absolute 08/08/2022 0.04    • nRBC 08/08/2022 0.0      Recent labs reviewed and interpreted for clinical significance and application            Level of Care:           Paul White MD  10/27/22  .

## 2022-11-29 RX ORDER — AMLODIPINE BESYLATE 5 MG/1
TABLET ORAL
Qty: 90 TABLET | Refills: 0 | Status: SHIPPED | OUTPATIENT
Start: 2022-11-29 | End: 2023-01-24

## 2022-12-15 ENCOUNTER — OFFICE VISIT (OUTPATIENT)
Dept: CARDIOLOGY | Facility: CLINIC | Age: 72
End: 2022-12-15

## 2022-12-15 ENCOUNTER — TELEPHONE (OUTPATIENT)
Dept: CARDIOLOGY | Facility: CLINIC | Age: 72
End: 2022-12-15

## 2022-12-15 VITALS
SYSTOLIC BLOOD PRESSURE: 158 MMHG | BODY MASS INDEX: 31.99 KG/M2 | OXYGEN SATURATION: 95 % | WEIGHT: 192 LBS | HEART RATE: 68 BPM | DIASTOLIC BLOOD PRESSURE: 77 MMHG | HEIGHT: 65 IN

## 2022-12-15 DIAGNOSIS — Z98.61 S/P PTCA (PERCUTANEOUS TRANSLUMINAL CORONARY ANGIOPLASTY): ICD-10-CM

## 2022-12-15 DIAGNOSIS — I10 ESSENTIAL HYPERTENSION: ICD-10-CM

## 2022-12-15 DIAGNOSIS — E78.2 HYPERLIPIDEMIA, MIXED: ICD-10-CM

## 2022-12-15 DIAGNOSIS — I25.10 CORONARY ARTERY DISEASE INVOLVING NATIVE CORONARY ARTERY OF NATIVE HEART WITHOUT ANGINA PECTORIS: Primary | ICD-10-CM

## 2022-12-15 PROCEDURE — 93000 ELECTROCARDIOGRAM COMPLETE: CPT | Performed by: NURSE PRACTITIONER

## 2022-12-15 PROCEDURE — 99214 OFFICE O/P EST MOD 30 MIN: CPT | Performed by: NURSE PRACTITIONER

## 2022-12-15 RX ORDER — CLOPIDOGREL BISULFATE 75 MG/1
75 TABLET ORAL DAILY
COMMUNITY
End: 2022-12-16 | Stop reason: SDUPTHER

## 2022-12-15 NOTE — TELEPHONE ENCOUNTER
Caller: Tye Avilez    Relationship to patient: Self    Patient is needing: PATIENT WAS CALLING IN TO LET DEMARCO KNOW THAT HE IS TAKING THE GENERIC MEDICATION FOR PLAVIX. 75MG ONE TIME DAILY.

## 2022-12-16 PROBLEM — Z98.61 S/P PTCA (PERCUTANEOUS TRANSLUMINAL CORONARY ANGIOPLASTY): Status: ACTIVE | Noted: 2022-12-16

## 2022-12-16 RX ORDER — CLOPIDOGREL BISULFATE 75 MG/1
75 TABLET ORAL DAILY
Qty: 30 TABLET | Refills: 11 | Status: SHIPPED | OUTPATIENT
Start: 2022-12-16

## 2022-12-16 NOTE — PROGRESS NOTES
Cardiology Office Follow Up Visit      Primary Care Provider:  Jodie Osborne APRN    Reason for f/u:     Coronary artery disease  Recent PCI at [facility]  Hypertension  Dyslipidemia      Subjective     CC:    Denies chest pain or dyspnea    History of Present Illness       Tye Avilez is a 72 y.o. male.  Patient is a very pleasant 72-year-old male who is known to have coronary artery disease.  He had remote coronary artery bypass grafting surgery in 1998.  Post bypass he underwent angioplasty with stenting to the saphenous vein graft to the posterior lateral marginal branch in 2011.  He is additionally known to have hypertension, dyslipidemia, diabetes and history of polio.    Patient had prior cardiac catheterization recently by Dr. White which showed anastomotic narrowing of the saphenous vein graft to the obtuse marginal branch that did not appear critical but does have an extremely large myocardial supply of the entire lateral wall.  Dr. White recommended that the patient return for a Lexiscan Myoview within 2 to 4 weeks to reevaluate for regional ischemia of the lateral wall and if present plans for reevaluation strategy for possible revascularization will be made.    Lexiscan Myoview was completed on September 14, 2022.  There was a moderate amount of reversibility in the inferior lateral wall.  He was seen back in the office and options were discussed.  And ultimately it was decided to refer him to [facility] for possible  revascularizations or other evaluations for treatment.    Patient was evaluated by [facility] and underwent cardiac catheterization on November 23, 2022.  He underwent IVUS guided PCI of the left circumflex with overlapping 3.0 x 15 mm, 3.5 x 33 mm, and 4.0 x 15 mm Xience [place] drug-eluting stents.    The patient denies any current chest pain.  He reports since he has been home he has been feeling well.  He has had no shortness of breath, PND, orthopnea,  palpitations, near syncope or feelings of his heart racing.  He reports compliance with medical therapy.  He is intolerant to statins due to previous myalgias      ASSESSMENT/PLAN:      Diagnoses and all orders for this visit:    1. Coronary artery disease involving native coronary artery of native heart without angina pectoris (Primary)  Comments:  Recent evaluation by [facility] with cardiac catheterization and PCI there on November 23    2. S/P PTCA (percutaneous transluminal coronary angioplasty)  Comments:  Status post PCI to the native left circumflex with drug-eluting stent x3    3. Essential hypertension  Comments:  Stable on current medical therapy    4. Hyperlipidemia, mixed  Comments:  Intolerant of statins due to myalgias            MEDICAL DECISION MAKING:      Patient presents for hospital follow-up.  He has had no recurrent chest pain or symptoms to suggest angina post PCI.    He is compliant with dual antiplatelet therapy.  He is not on a statin due to intolerance due to myalgias.    His blood pressure is mildly elevated today of asked him to check it periodically and we have discussed the goal systolic blood pressure would be in the 130s or below and diastolic less than 80.    The patient was scheduled follow-up with Dr. White.  At that time if things are stable would go back to yearly follow-ups.  If he develops any new or worsening problems of asked him to contact the office sooner.  The patient is in agreement to this plan.    He will continue dual antiplatelet therapy indefinitely..  Per his note from [facility] they recommended continuation of Plavix even after 1 year.      Past Medical History:   Diagnosis Date   • Coronary artery disease    • Diabetes mellitus (HCC)    • Hyperlipidemia    • Hypertension        Past Surgical History:   Procedure Laterality Date   • CARDIAC CATHETERIZATION      2015   • CARDIAC CATHETERIZATION N/A 08/10/2022    Procedure: Left Heart Cath;  Surgeon:  Paul White MD;  Location: [facility] CATH INVASIVE LOCATION;  Service: Cardiology;  Laterality: N/A;   • CAROTID ENDARTERECTOMY     • CAROTID STENT      PCI  to SVG supplying the LC/OM   • CORONARY ARTERY BYPASS GRAFT         • CORONARY STENT PLACEMENT           Current Outpatient Medications:   •  amLODIPine (NORVASC) 5 MG tablet, Take 2 tablets by mouth once daily, Disp: 90 tablet, Rfl: 0  •  aspirin 81 MG tablet, Take 81 mg by mouth Daily., Disp: , Rfl:   •  gemfibrozil (LOPID) 600 MG tablet, Take 1 tablet by mouth once daily, Disp: 90 tablet, Rfl: 0  •  glipiZIDE (GLUCOTROL) 5 MG tablet, 2.5 mg Daily., Disp: , Rfl:   •  metFORMIN ER (GLUCOPHAGE-XR) 500 MG 24 hr tablet, , Disp: , Rfl:   •  nitroglycerin (NITROSTAT) 0.4 MG SL tablet, Place 1 tablet under the tongue Every 5 (Five) Minutes As Needed for Chest Pain. Prn chest pain, Disp: 100 tablet, Rfl: 3  •  clopidogrel (PLAVIX) 75 MG tablet, Take 75 mg by mouth Daily., Disp: , Rfl:     Social History     Socioeconomic History   • Marital status:    Tobacco Use   • Smoking status: Former     Packs/day: 1.00     Years: 25.00     Pack years: 25.00     Types: Cigarettes     Quit date: 1998     Years since quittin.9   • Smokeless tobacco: Never   Vaping Use   • Vaping Use: Never used   Substance and Sexual Activity   • Alcohol use: Never   • Drug use: Never   • Sexual activity: Yes     Partners: Female     Birth control/protection: Post-menopausal       Family History   Problem Relation Age of Onset   • Heart disease Mother    • Hypertension Mother        The following portions of the patient's history were reviewed and updated as appropriate: allergies, current medications, past family history, past medical history, past social history, past surgical history and problem list.    Review of Systems   Constitutional: Negative for decreased appetite and diaphoresis.   HENT: Negative for congestion, hearing loss and nosebleeds.   "  Cardiovascular: Negative for chest pain, claudication, dyspnea on exertion, irregular heartbeat, leg swelling, near-syncope, orthopnea, palpitations, paroxysmal nocturnal dyspnea and syncope.   Respiratory: Negative for cough, shortness of breath and sleep disturbances due to breathing.    Endocrine: Negative for polyuria.   Hematologic/Lymphatic: Does not bruise/bleed easily.   Skin: Negative for itching and rash.   Musculoskeletal: Negative for back pain, muscle weakness and myalgias.   Gastrointestinal: Negative for abdominal pain, change in bowel habit and nausea.   Genitourinary: Negative for dysuria, flank pain, frequency and hesitancy.   Neurological: Negative for dizziness, tremors and weakness.   Psychiatric/Behavioral: Negative for altered mental status. The patient does not have insomnia.        Pertinent items are noted in HPI, all other systems reviewed and negative    /77 (BP Location: Right arm, Patient Position: Sitting, Cuff Size: Large Adult)   Pulse 68   Ht 165.1 cm (65\")   Wt 87.1 kg (192 lb)   SpO2 95%   BMI 31.95 kg/m² .  Objective     Physical Exam        ECG 12 Lead    Date/Time: 12/15/2022 2:31 PM  Performed by: Emma Rendon APRN  Authorized by: Emma Rendon APRN   Comparison: compared with previous ECG   Similar to previous ECG  Rhythm: sinus rhythm  Ectopy: unifocal PVCs  BPM: 68  Other findings: non-specific ST-T wave changes    Clinical impression: non-specific ECG            EKG ordered by and reviewed by me in office                  "

## 2022-12-29 ENCOUNTER — TELEPHONE (OUTPATIENT)
Dept: CARDIOLOGY | Facility: CLINIC | Age: 72
End: 2022-12-29

## 2022-12-29 NOTE — TELEPHONE ENCOUNTER
Caller: ROBINSON CORONA    Relationship: Emergency Contact    Best call back number: [phone]    Requested Prescriptions:   Requested Prescriptions      No prescriptions requested or ordered in this encounter        Pharmacy where request should be sent: [facility]     Additional details provided by patient: PT NEEDS LOSARTAN 100 MG. PLEASE CALL PT BACK TO DISCUSS IF THERE IS AN ISSUE WITH THE REFILL    Does the patient have less than a 3 day supply:  [x] Yes  [] No    Would you like a call back once the refill request has been completed: [] Yes [x] No    If the office needs to give you a call back, can they leave a voicemail: [] Yes [x] No    Kalli Wong Rep   12/29/22 09:37 EST

## 2022-12-29 NOTE — TELEPHONE ENCOUNTER
Was previously off losartan and then states he started taking this for about a week after his surgery. He says his blood pressure is occasionally in the 140s. He is taking coreg 3.125mg every am, amlodipine 10mg daily, imdur 60mg daily, gemfibrozil, plavix, aspirin. He is going to monitor his b/p bid for the next few days and call us back on Tuesday with those readings to see if losartan needs to be added. Dr yancey previously had him off of this.

## 2023-01-24 RX ORDER — AMLODIPINE BESYLATE 5 MG/1
TABLET ORAL
Qty: 90 TABLET | Refills: 0 | Status: SHIPPED | OUTPATIENT
Start: 2023-01-24 | End: 2023-03-17 | Stop reason: DRUGHIGH

## 2023-01-30 RX ORDER — GEMFIBROZIL 600 MG/1
TABLET, FILM COATED ORAL
Qty: 90 TABLET | Refills: 0 | Status: SHIPPED | OUTPATIENT
Start: 2023-01-30

## 2023-03-17 ENCOUNTER — OFFICE VISIT (OUTPATIENT)
Dept: CARDIOLOGY | Facility: CLINIC | Age: 73
End: 2023-03-17
Payer: MEDICARE

## 2023-03-17 VITALS
RESPIRATION RATE: 18 BRPM | HEIGHT: 65 IN | WEIGHT: 194 LBS | DIASTOLIC BLOOD PRESSURE: 72 MMHG | HEART RATE: 65 BPM | SYSTOLIC BLOOD PRESSURE: 157 MMHG | BODY MASS INDEX: 32.32 KG/M2

## 2023-03-17 DIAGNOSIS — R09.89 BILATERAL CAROTID BRUITS: ICD-10-CM

## 2023-03-17 DIAGNOSIS — I20.9 ANGINA PECTORIS: ICD-10-CM

## 2023-03-17 DIAGNOSIS — I25.118 CORONARY ARTERY DISEASE OF NATIVE ARTERY OF NATIVE HEART WITH STABLE ANGINA PECTORIS: ICD-10-CM

## 2023-03-17 DIAGNOSIS — I25.10 CORONARY ARTERY DISEASE INVOLVING NATIVE CORONARY ARTERY OF NATIVE HEART WITHOUT ANGINA PECTORIS: Primary | ICD-10-CM

## 2023-03-17 DIAGNOSIS — R41.3 MEMORY DIFFICULTY: ICD-10-CM

## 2023-03-17 DIAGNOSIS — E78.2 HYPERLIPIDEMIA, MIXED: ICD-10-CM

## 2023-03-17 DIAGNOSIS — Z98.61 S/P PTCA (PERCUTANEOUS TRANSLUMINAL CORONARY ANGIOPLASTY): ICD-10-CM

## 2023-03-17 DIAGNOSIS — I10 ESSENTIAL HYPERTENSION: ICD-10-CM

## 2023-03-17 PROCEDURE — 3078F DIAST BP <80 MM HG: CPT | Performed by: INTERNAL MEDICINE

## 2023-03-17 PROCEDURE — 3077F SYST BP >= 140 MM HG: CPT | Performed by: INTERNAL MEDICINE

## 2023-03-17 PROCEDURE — 99214 OFFICE O/P EST MOD 30 MIN: CPT | Performed by: INTERNAL MEDICINE

## 2023-03-17 RX ORDER — LOSARTAN POTASSIUM 25 MG/1
25 TABLET ORAL DAILY
Qty: 30 TABLET | Refills: 5 | Status: SHIPPED | OUTPATIENT
Start: 2023-03-17

## 2023-03-17 RX ORDER — ISOSORBIDE MONONITRATE 30 MG/1
30 TABLET, EXTENDED RELEASE ORAL DAILY
Qty: 30 TABLET | Refills: 5 | Status: SHIPPED | OUTPATIENT
Start: 2023-03-17

## 2023-03-17 RX ORDER — AMLODIPINE BESYLATE 5 MG/1
5 TABLET ORAL DAILY
Qty: 30 TABLET | Refills: 5 | Status: SHIPPED | OUTPATIENT
Start: 2023-03-17

## 2023-03-17 NOTE — PROGRESS NOTES
Cardiology Clinic Note  Paul White MD, PhD    Subjective:     Encounter Date:03/17/2023      Patient ID: Tye Avilez is a 72 y.o. male.    Chief Complaint:  Chief Complaint   Patient presents with   • Follow-up       HPI:    Previously  I had the pleasure to see this 72-year-old in clinic today in follow-up.  He has history of bypass surgery remotely 1998, PCI to the distal portion of the saphenous vein graft to a bifurcating obtuse marginal branch 2011 and repeat in 2022, hypertensive heart disease hyperlipidemia diabetes.  Previously he presented back to the clinic a few of months ago complaining of substernal chest discomfort dyspnea on exertion increasing in frequency severity and duration.  He is on aspirin and Plavix in addition to losartan and amlodipine gemfibrozil and diabetes medicines.  He was on max medical therapy,   Ultimately patient underwent invasive ischemic evaluation demonstrating widely patent MCNEILL to LAD, patent native RCA small caliber mild luminal regularities diffusely but otherwise patent with FILIPE-3 flow, saphenous vein graft to obtuse marginal appears to have 50% aorto ostial takeoff narrowing but with adequate flow and no pressure diminution on catheter engagement, there is nonobstructive disease 30 to 40% diffusely throughout, the distal anastomotic portion of this graft has a stent with 40% in-stent restenosis however the distal edge has significant 70 to 80 perhaps even 90 % stenosis at the anastomosis into a bifurcating obtuse marginal branch of which the superior limb has 90% stenosis most severely in the view, the inferior limb 70% and are both small caliber vessels 2 to 2.5 mm in diameter.  There is also retrograde perfusion through the ascending/ retrograde limb of the obtuse marginal back to the circumflex which gives PLV branches along the inferolateral wall.  The left main is completely occluded providing no collateral flow and he is completely dependent on his vein  graft anastomosis for the lateral wall.   Diagonal has retrograde perfusion for LIMA to LAD which was widely patent again.  He has preserved EF at 60 to 65%.  Given difficulty with IFR and FFR measurements given small distal caliber size multiple runoff distributions ultimately he underwent nuclear perfusion study demonstrating reversibility of the lateral and inferolateral wall under stress conditions indicating significant contribution of distal anastomotic disease as well as superior and inferior limb proximal stenosis.  Stress September 2020 demonstrated lateral and inferolateral wall ischemia indicating vein graft significantly stenosed with physiologic limitation of myocardial supply.    Patient was evaluated by [facility] and underwent cardiac catheterization on November 23, 2022.  He underwent IVUS guided PCI of the left circumflex with overlapping 3.0 x 15 mm, 3.5 x 33 mm, and 4.0 x 15 mm Xience nawaf point drug-eluting stents.    Presents back today with complete anginal chest pain relieved, euvolemic well compensated hemodynamically electrically stable, not on statin given severe statin intolerance and elevated LFTs previously with severe myalgias, is not on PCSK9 at this point which we are evaluating for.  No other concerns really today, we are optimizing his medicines, he is slightly hypertensive and is restarting of his ARB therapy losartan    Exam  Vitals reviewed below  Regular rate and rhythm with no rubs Ford lift, 1 out of 6 systolic ejection murmur left sternal border  Left greater than right carotid bruit softly  No JVD HJR  No edema  No clubbing cyanosis  Intact grossly  Clear to auscultation  Soft nontender nondistended       Assessment and plan today  Multivessel CAD  Status post left main and to circumflex  revascularization [facility]  Essential hypertension  PVD with carotid bruits  Hyperlipidemia  Statin intolerance  History of chronic stable angina  Type 2  "diabetes        Status post revascularization left main and circumflex, complete resolution of anginal chest pain feels much better  Continue aspirin Plavix  Diabetes control  Evaluate for PCSK9 inhibitor was recommended  Hypertensive today, restart losartan 25 daily, amlodipine 5 daily, nitrates with Imdur 30 daily, beta-blocker with heart rates in the low 60s upper 50s  Carotid ultrasound as needed for repeat which we will order today with left greater than right bruit    Neurology referral with increasing memory difficulties, dementia evaluation to see if he is a candidate for medications versus needs any imaging such as MRI        See him back in 6 months  Paul White MD, PhD       Objective:         /72 (BP Location: Right arm, Patient Position: Sitting)   Pulse 65   Resp 18   Ht 165.1 cm (65\")   Wt 88 kg (194 lb)   BMI 32.28 kg/m²         The pleasure to be involved in this patient's cardiovascular care.  Please call with any questions or concerns  Paul White MD, PhD    Most recent EKG as reviewed and interpreted by me:  Procedures     Most recent echo as reviewed and interpreted by me:      Most recent stress test as reviewed and interpreted by me:  Results for orders placed during the hospital encounter of 09/14/22    Stress Test With Myocardial Perfusion (1 Day)    Interpretation Summary  · Left ventricular ejection fraction is mildly reduced. (Calculated EF = 44%).  · Myocardial perfusion imaging indicates a medium-sized, moderately severe area of ischemia located in the lateral wall.  · Impressions are consistent with a high risk study.  · Study demonstrates reversibility L4-5 segments of the myocardium mainly located in the lateral and inferolateral wall consistent with ischemia EF by gated imaging 46 to 50% mildly reduced.  · Findings consistent with an equivocal ECG stress test.    Abnormal study  Stress ECG did not restarted heart rate, there were nonspecific changes at submaximal " stress in the inferolateral leads not meeting diagnostic criteria for ST depression truly  No arrhythmias seen  Sinus rhythm  Nuclear imaging with moderate amount of reversibility indicative of ischemia in the lateral and inferolateral wall, EF 46% to 50% by gated imaging    Abnormal study  Lateral inferolateral ischemia s indicated by the present study,      Most recent cardiac catheterization as reviewed interpreted by me:  Results for orders placed during the hospital encounter of 08/10/22    Cardiac Catheterization/Vascular Study    Narrative   Paul White MD, PhD  [facility] cardiology  Date of service 8-    Procedure  1.  Left heart catheterization with coronary angiography of native and bypass grafts, LV gram    Indication  Recurrent anginal chest pain    After informed consent patient is brought to the Cath Lab sterilely prepped and draped in usual fashion expose the right groin for right common femoral arterial access via micropuncture modified Seldinger technique with placement of a 6 Georgian sheath.  035 guidewire was advanced aortic valve followed by diagnostic JL 4 and JR4 catheters for selective left and right coronary angiography as well as bypass graft with SVG to OM and LIMA to LAD.  JR4 was used across aortic valve followed by hand-injection for LV gram, EDP assessment and pullback assessment of the transaortic valve gradient.  All catheters equipment removed.  Sheath flushed with heparinized saline to be removed by manual pressure.  He left Cath Lab chest pain-free hemodynamically electrically stable and talking to staff neurologically grossly tight bilaterally    Findings  1 opening aortic pressure was 180/97  2.  Closing pressure was 162/87  3.  LVEDP 10-12  4.  LV function low normal 50 to 55% globally  5.  Normal transaortic valve gradient    Angiography  1 left main   Over 2 LAD proximally , perfused by widely patent LIMA to LAD with retrograde flow widely  patent flow retrogradely to the diagonal branch across the anterolateral wall, there is no anastomotic disease, no downstream disease or even retrograde disease significantly with only mild diffuse luminal regularities, highly functional LIMA to LAD graft  3.  Circumflex ostial proximal , perfused by SVG to second obtuse marginal branch.  There is a patent stent in the proximal portion with ostial 50% narrowing in a very large caliber graft, there is copious contrast E flux surrounding the diagnostic catheter from the ostial portion of the graft into the aorta, there is diffuse luminal regularities throughout the graft, mild degeneration 20 to 30%, there is a patent stent to the distal portion of the graft just immediately prior to anastomosis there is otherwise widely patent.  Immediately after this stent terminates there is a short 60% to 70% narrowing at the anastomosis of the graft to the native coronary artery.,  There is retrograde flow from the anastomosis to the 2 obtuse marginal branches superior and inferior limbs as well as retrogradely to the circumflex as well as in the AV groove for continuation circumflex and L PLV branch.  This single graft supplies the entire lateral wall and inferolateral wall.,  There is no disease in the native circulation other than mild luminal irregularities  4.  RCA small, codominant, there is ostial 50% narrowing, diffuse atherosclerotic disease of 30 to 40% but no focal stenotic lesion or flow limitation, small vessel disease distally is seen but not amenable to intervention secondary to small caliber vessel size in the PLV and PDA    Patent SVG to obtuse marginal, patent stents in this as described, concern over anastomotic disease and narrowing given the large myocardial supply of the entire lateral wall however there is FILIPE-3 flow antegrade and retrogradely,    Widely patent LIMA to LAD with no disease    Conclusions recommendations  1.  Anginal chest pain,  uncontrolled blood pressure with stage II hypertension, today was 180 systolic on entering the Cath Lab, even while sleeping was above 165 systolic  2.  SVG to obtuse marginal branch has anastomotic narrowing but does not appear critical but does have a extremely large myocardial supply of the entire lateral wall    Intervention to the anastomosis of the SVG to obtuse marginal branch with jailed the retrograde limb back to the circumflex and to the inferolateral wall with L PLV segment in the AV groove which is sizable as well as likely the superior limb of the obtuse marginal, there is a high chance of diminishing graft functionality over time in this location as well as high chance of in-stent restenosis at this anastomotic location    We will discharge him today, advance his antianginals as well as his antihypertensives including his amlodipine, start Imdur.  We will bring him back in 2 to 4 weeks for Lexiscan stress to evaluate regional ischemia of the lateral wall if present will reevaluate best optimal strategy for lateral wall revascularization or control of his angina once blood pressure is also controlled    Patient is amenable for this conservative approach  He will be continued on aspirin and Plavix in addition to high intensity statin therapy, no beta-blocker secondary to heart rates in the 60s    Paul White MD, PhD    The following portions of the patient's history were reviewed and updated as appropriate: allergies, current medications, past family history, past medical history, past social history, past surgical history and problem list.      ROS:  14 point review of systems negative except as mentioned above    Current Outpatient Medications:   •  amLODIPine (NORVASC) 5 MG tablet, Take 2 tablets by mouth once daily, Disp: 90 tablet, Rfl: 0  •  aspirin 81 MG tablet, Take 81 mg by mouth Daily., Disp: , Rfl:   •  clopidogrel (PLAVIX) 75 MG tablet, Take 1 tablet by mouth Daily., Disp: 30 tablet, Rfl:  11  •  gemfibrozil (LOPID) 600 MG tablet, Take 1 tablet by mouth once daily, Disp: 90 tablet, Rfl: 0  •  glipiZIDE (GLUCOTROL) 5 MG tablet, 2.5 mg Daily., Disp: , Rfl:   •  metFORMIN ER (GLUCOPHAGE-XR) 500 MG 24 hr tablet, , Disp: , Rfl:   •  nitroglycerin (NITROSTAT) 0.4 MG SL tablet, Place 1 tablet under the tongue Every 5 (Five) Minutes As Needed for Chest Pain. Prn chest pain, Disp: 100 tablet, Rfl: 3    Problem List:  Patient Active Problem List   Diagnosis   • Bilateral carotid artery stenosis   • Bradycardia   • Coronary artery disease   • Status post coronary artery bypass graft   • Essential hypertension   • Hyperlipidemia, mixed   • Type 2 diabetes mellitus (HCC)   • Body fluid retention   • Angina pectoris (HCC)   • Acute poliomyelitis   • H/O: pneumonia   • History of CEA (carotid endarterectomy)   • S/P PTCA (percutaneous transluminal coronary angioplasty)     Past Medical History:  Past Medical History:   Diagnosis Date   • Coronary artery disease    • Diabetes mellitus (HCC)    • Hyperlipidemia    • Hypertension      Past Surgical History:  Past Surgical History:   Procedure Laterality Date   • CARDIAC CATHETERIZATION         • CARDIAC CATHETERIZATION N/A 08/10/2022    Procedure: Left Heart Cath;  Surgeon: Paul White MD;  Location: [facility] CATH INVASIVE LOCATION;  Service: Cardiology;  Laterality: N/A;   • CAROTID ENDARTERECTOMY     • CAROTID STENT      PCI  to SVG supplying the LC/OM   • CORONARY ARTERY BYPASS GRAFT         • CORONARY STENT PLACEMENT       Social History:  Social History     Socioeconomic History   • Marital status:    Tobacco Use   • Smoking status: Former     Packs/day: 1.00     Years: 25.00     Pack years: 25.00     Types: Cigarettes     Quit date: 1998     Years since quittin.2   • Smokeless tobacco: Never   Vaping Use   • Vaping Use: Never used   Substance and Sexual Activity   • Alcohol use: Never   • Drug use: Never   • Sexual activity:  Yes     Partners: Female     Birth control/protection: Post-menopausal     Allergies:  Allergies   Allergen Reactions   • Statins Cough     Severe muscle aches/spasms     Immunizations:  Immunization History   Administered Date(s) Administered   • Pneumococcal Polysaccharide (PPSV23) 02/24/2017   • TD Preservative Free 05/07/2015            In-Office Procedure(s):  No orders to display        ASCVD RIsk Score::  The ASCVD Risk score (Timo SAL, et al., 2019) failed to calculate for the following reasons:    Cannot find a previous HDL lab    Cannot find a previous total cholesterol lab    Imaging:                 Lab Review:   No visits with results within 6 Month(s) from this visit.   Latest known visit with results is:   Hospital Outpatient Visit on 09/14/2022   Component Date Value   • BH CV STRESS PROTOCOL 1 09/14/2022 Pharmacologic    • Stage 1 09/14/2022 1    • HR Stage 1 09/14/2022 57    • BP Stage 1 09/14/2022 180/68    • Duration Min Stage 1 09/14/2022 0    • Duration Sec Stage 1 09/14/2022 10    • Stress Dose Regadenoson * 09/14/2022 0.4    • Stress Comments Stage 1 09/14/2022 10 sec bolus injection    • Stage 2 09/14/2022 2    • HR Stage 2 09/14/2022 87    • BP Stage 2 09/14/2022 157/78    • Duration Min Stage 2 09/14/2022 4    • Duration Sec Stage 2 09/14/2022 0    • Stress Comments Stage 2 09/14/2022 recovery    • Baseline HR 09/14/2022 57    • Baseline BP 09/14/2022 180/68    • Peak HR 09/14/2022 87    • Percent Max Pred HR 09/14/2022 58.78    • Percent Target HR 09/14/2022 69    • Peak BP 09/14/2022 157/78    • Recovery HR 09/14/2022 59    • Recovery BP 09/14/2022 159/64    • Target HR (85%) 09/14/2022 126    • Max. Pred. HR (100%) 09/14/2022 148    • BH CV REST NUCLEAR ISOTO* 09/14/2022 11.5    • BH CV STRESS NUCLEAR ISO* 09/14/2022 33.0    • Nuc Stress EF 09/14/2022 44      Recent labs reviewed and interpreted for clinical significance and application            Level of Care:           Paul Hay  Abhijeet White MD  03/17/23  .

## 2023-03-29 ENCOUNTER — HOSPITAL ENCOUNTER (OUTPATIENT)
Dept: CARDIOLOGY | Facility: HOSPITAL | Age: 73
Discharge: HOME | End: 2023-03-29
Admitting: INTERNAL MEDICINE
Payer: MEDICARE

## 2023-03-29 DIAGNOSIS — R09.89 BILATERAL CAROTID BRUITS: ICD-10-CM

## 2023-03-29 LAB
BH CV XLRA MEAS LEFT DIST CCA EDV: 15.8 CM/SEC
BH CV XLRA MEAS LEFT DIST CCA PSV: 68.9 CM/SEC
BH CV XLRA MEAS LEFT DIST ICA EDV: -16.5 CM/SEC
BH CV XLRA MEAS LEFT DIST ICA PSV: -79.6 CM/SEC
BH CV XLRA MEAS LEFT ICA/CCA RATIO: -1.16
BH CV XLRA MEAS LEFT PROX CCA EDV: 8.1 CM/SEC
BH CV XLRA MEAS LEFT PROX CCA PSV: 41.3 CM/SEC
BH CV XLRA MEAS LEFT PROX ECA PSV: 140 CM/SEC
BH CV XLRA MEAS LEFT PROX ICA EDV: -14.8 CM/SEC
BH CV XLRA MEAS LEFT PROX ICA PSV: -79 CM/SEC
BH CV XLRA MEAS LEFT PROX SCLA PSV: 212 CM/SEC
BH CV XLRA MEAS LEFT VERTEBRAL A EDV: 9.7 CM/SEC
BH CV XLRA MEAS LEFT VERTEBRAL A PSV: 28.5 CM/SEC
BH CV XLRA MEAS RIGHT DIST CCA EDV: 9.8 CM/SEC
BH CV XLRA MEAS RIGHT DIST CCA PSV: 63.9 CM/SEC
BH CV XLRA MEAS RIGHT DIST ICA EDV: -18.1 CM/SEC
BH CV XLRA MEAS RIGHT DIST ICA PSV: -52.1 CM/SEC
BH CV XLRA MEAS RIGHT ICA/CCA RATIO: 1.81
BH CV XLRA MEAS RIGHT PROX CCA EDV: -10.4 CM/SEC
BH CV XLRA MEAS RIGHT PROX CCA PSV: -68.6 CM/SEC
BH CV XLRA MEAS RIGHT PROX ECA PSV: 113 CM/SEC
BH CV XLRA MEAS RIGHT PROX ICA EDV: -15.6 CM/SEC
BH CV XLRA MEAS RIGHT PROX ICA PSV: -124 CM/SEC
BH CV XLRA MEAS RIGHT PROX SCLA PSV: 134 CM/SEC
BH CV XLRA MEAS RIGHT VERTEBRAL A EDV: -9.9 CM/SEC
BH CV XLRA MEAS RIGHT VERTEBRAL A PSV: -37.9 CM/SEC
LEFT ARM BP: NORMAL MMHG
MAXIMAL PREDICTED HEART RATE: 147 BPM
RIGHT ARM BP: NORMAL MMHG
STRESS TARGET HR: 125 BPM

## 2023-03-29 PROCEDURE — 93880 EXTRACRANIAL BILAT STUDY: CPT

## 2023-05-26 RX ORDER — GEMFIBROZIL 600 MG/1
TABLET, FILM COATED ORAL
Qty: 90 TABLET | Refills: 0 | Status: SHIPPED | OUTPATIENT
Start: 2023-05-26

## 2023-08-17 RX ORDER — GEMFIBROZIL 600 MG/1
TABLET, FILM COATED ORAL
Qty: 90 TABLET | Refills: 0 | Status: SHIPPED | OUTPATIENT
Start: 2023-08-17

## 2023-08-29 NOTE — PROGRESS NOTES
"Chief Complaint  Memory Loss    Subjective            Tye Avilez presents to [facility] NEUROLOGY for memory  History of Present Illness  New patient referred by Dr. White for memory loss  Patient and his wife state they don't believe he is having memory issues, but if Dr. White believes he needs to be evaluated he is happy to do so.    Snores a little, no observed apnea.     At least one concussion with brief LOC  Aunt with dementia paternal aunt, mother  young, father was 82 good memory.     Has had anesthesia for back surgery 3 years ago 7 or 8 surgeries.   Noted memory problems after bypass surgery at age 47    No history of strokes    History of polio at age 1, effected the left leg             Maximum   Score Patient's   Score Questions   5 5 \"What is the year?Season?Date?Day of the week?Month?\"   5 5 \"Where are we now: State?County?Town/city?Hospital?Floor?\"   3 3 3 Unrelated objects Number of trials:___   5 5 Count backward from 100 by sevens or spell WORLD backwards   3 2 Name 3 things from above   2 2 Identify 2 objects   1 1 Repeat the phrase: No ifs, ands,or buts.   3 3 Take paper in right hand, fold it in half, and put it on the floor.   1 1 Please read this and do what it says. \"Close your eyes\"   1 1 Make up and write a sentence about anything. Noun and verb   1 1 Copy this picture 10 angles must be present.   30 29 Total MMSE        Family History   Problem Relation Age of Onset    Heart disease Mother     Hypertension Mother        Past Medical History:   Diagnosis Date    Coronary artery disease     Diabetes mellitus     Hyperlipidemia     Hypertension        Social History     Socioeconomic History    Marital status:    Tobacco Use    Smoking status: Former     Packs/day: 1.00     Years: 25.00     Pack years: 25.00     Types: Cigarettes     Quit date: 1998     Years since quittin.6    Smokeless tobacco: Never   Vaping Use    Vaping Use: Never used " "  Substance and Sexual Activity    Alcohol use: Never    Drug use: Never    Sexual activity: Yes     Partners: Female     Birth control/protection: Post-menopausal         Current Outpatient Medications:     amLODIPine (NORVASC) 5 MG tablet, Take 1 tablet by mouth Daily., Disp: 30 tablet, Rfl: 5    aspirin 81 MG tablet, Take 1 tablet by mouth Daily., Disp: , Rfl:     clopidogrel (PLAVIX) 75 MG tablet, Take 1 tablet by mouth Daily., Disp: 30 tablet, Rfl: 11    glipiZIDE (GLUCOTROL) 5 MG tablet, 0.5 tablets Daily., Disp: , Rfl:     isosorbide mononitrate (IMDUR) 30 MG 24 hr tablet, Take 1 tablet by mouth Daily., Disp: 30 tablet, Rfl: 5    losartan (Cozaar) 25 MG tablet, Take 1 tablet by mouth Daily., Disp: 30 tablet, Rfl: 5    metFORMIN ER (GLUCOPHAGE-XR) 500 MG 24 hr tablet, , Disp: , Rfl:     nitroglycerin (NITROSTAT) 0.4 MG SL tablet, Place 1 tablet under the tongue Every 5 (Five) Minutes As Needed for Chest Pain. Prn chest pain, Disp: 100 tablet, Rfl: 3    gemfibrozil (LOPID) 600 MG tablet, Take 1 tablet by mouth once daily, Disp: 90 tablet, Rfl: 0    Review of Systems   Constitutional:  Negative for fatigue.   Respiratory:  Negative for apnea.    Musculoskeletal:  Negative for gait problem.   Psychiatric/Behavioral:  Negative for hallucinations.    All other systems reviewed and are negative.         Objective   Vital Signs:   /75   Pulse 56   Ht 165.1 cm (65\")   Wt 87.1 kg (192 lb)   BMI 31.95 kg/mý     Physical Exam  Vitals reviewed.   Cardiovascular:      Rate and Rhythm: Normal rate.   Pulmonary:      Effort: Pulmonary effort is normal. No respiratory distress.   Neurological:      General: No focal deficit present.      Mental Status: He is alert.      Gait: Gait is intact.      Deep Tendon Reflexes:      Reflex Scores:       Bicep reflexes are 2+ on the right side and 2+ on the left side.  Psychiatric:         Mood and Affect: Mood normal.      Result Review :                Neurologic Exam "     Mental Status   Attention: normal.   Level of consciousness: alert  Knowledge: good.     Cranial Nerves     CN V   Facial sensation intact.     CN VII   Facial expression full, symmetric.     CN VIII   CN VIII normal.     CN IX, X   CN IX normal.     Motor Exam Left leg smaller due to polio at age 1     Sensory Exam   Light touch normal.     Gait, Coordination, and Reflexes     Gait  Gait: normal    Reflexes   Right biceps: 2+  Left biceps: 2+           Assessment and Plan    Diagnoses and all orders for this visit:    1. Memory loss (Primary)  -     Vitamin B12; Future  -     Folate; Future  -     Calcitriol (1,25 di-OH Vitamin D); Future  -     T Pallidum Antibody (FTA-Ab); Future  -     Vitamin B6; Future  -     Copper, Serum; Future  -     Vitamin E; Future  -     TSH; Future  -     MRI Brain With & Without Contrast; Future    Mild memory impairment  Check mri brain and labs    If memory worsens will rx aricept      Follow Up   Return in about 1 year (around 8/30/2024).  Patient was given instructions and counseling regarding his condition or for health maintenance advice. Please see specific information pulled into the AVS if appropriate.         This document has been electronically signed by Joseph Seipel, MD on August 30, 2023 10:15 EDT

## 2023-08-30 ENCOUNTER — OFFICE VISIT (OUTPATIENT)
Dept: NEUROLOGY | Facility: CLINIC | Age: 73
End: 2023-08-30
Payer: MEDICARE

## 2023-08-30 VITALS
HEIGHT: 65 IN | DIASTOLIC BLOOD PRESSURE: 75 MMHG | BODY MASS INDEX: 31.99 KG/M2 | HEART RATE: 56 BPM | SYSTOLIC BLOOD PRESSURE: 159 MMHG | WEIGHT: 192 LBS

## 2023-08-30 DIAGNOSIS — R41.3 MEMORY LOSS: Primary | ICD-10-CM

## 2023-08-30 PROCEDURE — 99204 OFFICE O/P NEW MOD 45 MIN: CPT | Performed by: PSYCHIATRY & NEUROLOGY

## 2023-08-30 PROCEDURE — 1159F MED LIST DOCD IN RCRD: CPT | Performed by: PSYCHIATRY & NEUROLOGY

## 2023-08-30 PROCEDURE — 1160F RVW MEDS BY RX/DR IN RCRD: CPT | Performed by: PSYCHIATRY & NEUROLOGY

## 2023-08-30 PROCEDURE — 3078F DIAST BP <80 MM HG: CPT | Performed by: PSYCHIATRY & NEUROLOGY

## 2023-08-30 PROCEDURE — 3077F SYST BP >= 140 MM HG: CPT | Performed by: PSYCHIATRY & NEUROLOGY

## 2023-09-11 RX ORDER — LOSARTAN POTASSIUM 25 MG/1
TABLET ORAL
Qty: 30 TABLET | Refills: 0 | Status: SHIPPED | OUTPATIENT
Start: 2023-09-11

## 2023-09-20 ENCOUNTER — OFFICE VISIT (OUTPATIENT)
Dept: CARDIOLOGY | Facility: CLINIC | Age: 73
End: 2023-09-20

## 2023-09-20 VITALS
HEIGHT: 65 IN | DIASTOLIC BLOOD PRESSURE: 72 MMHG | SYSTOLIC BLOOD PRESSURE: 128 MMHG | RESPIRATION RATE: 18 BRPM | HEART RATE: 66 BPM | WEIGHT: 196 LBS | BODY MASS INDEX: 32.65 KG/M2

## 2023-09-20 DIAGNOSIS — I10 ESSENTIAL HYPERTENSION: ICD-10-CM

## 2023-09-20 DIAGNOSIS — E78.2 HYPERLIPIDEMIA, MIXED: ICD-10-CM

## 2023-09-20 DIAGNOSIS — Z98.61 S/P PTCA (PERCUTANEOUS TRANSLUMINAL CORONARY ANGIOPLASTY): ICD-10-CM

## 2023-09-20 DIAGNOSIS — I25.118 CORONARY ARTERY DISEASE OF NATIVE ARTERY OF NATIVE HEART WITH STABLE ANGINA PECTORIS: ICD-10-CM

## 2023-09-20 DIAGNOSIS — R09.89 BILATERAL CAROTID BRUITS: Primary | ICD-10-CM

## 2023-09-20 DIAGNOSIS — Z01.818 PRE-OP TESTING: ICD-10-CM

## 2023-09-20 DIAGNOSIS — I25.10 CORONARY ARTERY DISEASE INVOLVING NATIVE CORONARY ARTERY OF NATIVE HEART WITHOUT ANGINA PECTORIS: ICD-10-CM

## 2023-09-20 PROCEDURE — 99214 OFFICE O/P EST MOD 30 MIN: CPT | Performed by: INTERNAL MEDICINE

## 2023-09-20 PROCEDURE — 3078F DIAST BP <80 MM HG: CPT | Performed by: INTERNAL MEDICINE

## 2023-09-20 PROCEDURE — 3074F SYST BP LT 130 MM HG: CPT | Performed by: INTERNAL MEDICINE

## 2023-09-20 NOTE — PROGRESS NOTES
Cardiology Clinic Note  Paul White MD, PhD    Subjective:     Encounter Date:09/20/2023      Patient ID: Tye Avilez is a 72 y.o. male.    Chief Complaint:  Chief Complaint   Patient presents with    Follow-up       HPI:      I had the pleasure to see this 72-year-old in clinic today in follow-up.  He has history of bypass surgery remotely 1998, PCI to the distal portion of the saphenous vein graft to a bifurcating obtuse marginal branch 2011 and repeat in 2022, hypertensive heart disease hyperlipidemia diabetes.  Previously he presented back to the clinic a few of months ago complaining of substernal chest discomfort dyspnea on exertion increasing in frequency severity and duration.  He is on aspirin and Plavix in addition to losartan and amlodipine gemfibrozil and diabetes medicines.  He was on max medical therapy,   Ultimately patient underwent invasive ischemic evaluation demonstrating widely patent MCNEILL to LAD, patent native RCA small caliber mild luminal regularities diffusely but otherwise patent with FILIPE-3 flow, saphenous vein graft to obtuse marginal appears to have 50% aorto ostial takeoff narrowing but with adequate flow and no pressure diminution on catheter engagement, there is nonobstructive disease 30 to 40% diffusely throughout, the distal anastomotic portion of this graft has a stent with 40% in-stent restenosis however the distal edge has significant 70 to 80 perhaps even 90 % stenosis at the anastomosis into a bifurcating obtuse marginal branch of which the superior limb has 90% stenosis most severely in the view, the inferior limb 70% and are both small caliber vessels 2 to 2.5 mm in diameter.  There is also retrograde perfusion through the ascending/ retrograde limb of the obtuse marginal back to the circumflex which gives PLV branches along the inferolateral wall.  The left main is completely occluded providing no collateral flow and he is completely dependent on his vein graft  anastomosis for the lateral wall.   Diagonal has retrograde perfusion for LIMA to LAD which was widely patent again.  He has preserved EF at 60 to 65%.  Given difficulty with IFR and FFR measurements given small distal caliber size multiple runoff distributions ultimately he underwent nuclear perfusion study demonstrating reversibility of the lateral and inferolateral wall under stress conditions indicating significant contribution of distal anastomotic disease as well as superior and inferior limb proximal stenosis.  Stress September 2020 demonstrated lateral and inferolateral wall ischemia indicating vein graft significantly stenosed with physiologic limitation of myocardial supply.     Patient was evaluated by [facility] and underwent cardiac catheterization on November 23, 2022.  He underwent IVUS guided PCI of the left circumflex with overlapping 3.0 x 15 mm, 3.5 x 33 mm, and 4.0 x 15 mm Xience nawaf point drug-eluting stents.     Presents back today with continued complete anginal chest pain relieved, euvolemic well compensated hemodynamically electrically stable, not on statin given severe statin intolerance and elevated LFTs previously with severe myalgias, is not on PCSK9 at this point which we are evaluating for.  No other concerns really today, we are optimizing his medicines, he is slightly hypertensive and is restarting of his ARB therapy losartan     Exam  Vitals reviewed below  Regular rate and rhythm with no rubs gallops heave or lift, 1 out of 6 systolic ejection murmur left sternal border stable  Left greater than right carotid bruit softly  No JVD HJR  No edema  No clubbing cyanosis  Intact grossly  Clear to auscultation  Soft nontender nondistended        Assessment and plan today  Multivessel CAD  Status post left main and to circumflex  revascularization [facility]  Essential hypertension  PVD with carotid bruits  Hyperlipidemia  Statin intolerance  History of chronic stable  "angina  Type 2 diabetes           Status post revascularization left main and circumflex, complete resolution of anginal chest pain feels much better  Continue aspirin Plavix  Diabetes control  Evaluate for PCSK9 inhibitor was recommended    Carotid Dopplers demonstrate right carotid without any significant disease, left less than 50% stenosis, indication was prior bruits left greater than right    Blood pressures well controlled, losartan 25 daily, amlodipine 5 daily, beta-blocker with heart rates in the low 60s upper 50s    Patient asking about sildenafil for ED, will stop Imdur completely, advised not to take nitrates within at least 2 to 3 days of taking Viagra/sildenafil and would avoid completely, if he has chest pain refractory medical therapy would go to the hospital for this, he has had no recurrent chest pain to this point    Prior neurology referral with increasing memory difficulties, dementia evaluation to see if he is a candidate for medications versus needs any imaging such as MRI     Secondary prevention goals  Diet and exercise per AHA guidelines  Goal-directed medical therapy on with antiplatelet therapy, lipid-lowering therapy per guidelines, afterload reduction, diabetes treatment    Zetia/bempedoic acid for lipid-lowering therapy, reassess lipids with complaint statin intolerance    9-month follow-up    Objective:         /72 (BP Location: Left arm, Patient Position: Sitting)   Pulse 66   Resp 18   Ht 165.1 cm (65\")   Wt 88.9 kg (196 lb)   BMI 32.62 kg/m²         The pleasure to be involved in this patient's cardiovascular care.  Please call with any questions or concerns  Paul White MD, PhD    Most recent EKG as reviewed and interpreted by me:  Procedures     Most recent echo as reviewed and interpreted by me:      Most recent stress test as reviewed and interpreted by me:  Results for orders placed during the hospital encounter of 09/14/22    Stress Test With Myocardial " Perfusion (1 Day)    Interpretation Summary  · Left ventricular ejection fraction is mildly reduced. (Calculated EF = 44%).  · Myocardial perfusion imaging indicates a medium-sized, moderately severe area of ischemia located in the lateral wall.  · Impressions are consistent with a high risk study.  · Study demonstrates reversibility L4-5 segments of the myocardium mainly located in the lateral and inferolateral wall consistent with ischemia EF by gated imaging 46 to 50% mildly reduced.  · Findings consistent with an equivocal ECG stress test.    Abnormal study  Stress ECG did not restarted heart rate, there were nonspecific changes at submaximal stress in the inferolateral leads not meeting diagnostic criteria for ST depression truly  No arrhythmias seen  Sinus rhythm  Nuclear imaging with moderate amount of reversibility indicative of ischemia in the lateral and inferolateral wall, EF 46% to 50% by gated imaging    Abnormal study  Lateral inferolateral ischemia s indicated by the present study,      Most recent cardiac catheterization as reviewed interpreted by me:  Results for orders placed during the hospital encounter of 08/10/22    Cardiac Catheterization/Vascular Study    Narrative   Paul White MD, PhD  [facility]  Date of service 8-    Procedure  1.  Left heart catheterization with coronary angiography of native and bypass grafts, LV gram    Indication  Recurrent anginal chest pain    After informed consent patient is brought to the Cath Lab sterilely prepped and draped in usual fashion expose the right groin for right common femoral arterial access via micropuncture modified Seldinger technique with placement of a 6 Serbian sheath.  035 guidewire was advanced aortic valve followed by diagnostic JL 4 and JR4 catheters for selective left and right coronary angiography as well as bypass graft with SVG to OM and LIMA to LAD.  JR4 was used across aortic valve followed by  hand-injection for LV gram, EDP assessment and pullback assessment of the transaortic valve gradient.  All catheters equipment removed.  Sheath flushed with heparinized saline to be removed by manual pressure.  He left Cath Lab chest pain-free hemodynamically electrically stable and talking to staff neurologically grossly tight bilaterally    Findings  1 opening aortic pressure was 180/97  2.  Closing pressure was 162/87  3.  LVEDP 10-12  4.  LV function low normal 50 to 55% globally  5.  Normal transaortic valve gradient    Angiography  1 left main   Over 2 LAD proximally , perfused by widely patent LIMA to LAD with retrograde flow widely patent flow retrogradely to the diagonal branch across the anterolateral wall, there is no anastomotic disease, no downstream disease or even retrograde disease significantly with only mild diffuse luminal regularities, highly functional LIMA to LAD graft  3.  Circumflex ostial proximal , perfused by SVG to second obtuse marginal branch.  There is a patent stent in the proximal portion with ostial 50% narrowing in a very large caliber graft, there is copious contrast E flux surrounding the diagnostic catheter from the ostial portion of the graft into the aorta, there is diffuse luminal regularities throughout the graft, mild degeneration 20 to 30%, there is a patent stent to the distal portion of the graft just immediately prior to anastomosis there is otherwise widely patent.  Immediately after this stent terminates there is a short 60% to 70% narrowing at the anastomosis of the graft to the native coronary artery.,  There is retrograde flow from the anastomosis to the 2 obtuse marginal branches superior and inferior limbs as well as retrogradely to the circumflex as well as in the AV groove for continuation circumflex and L PLV branch.  This single graft supplies the entire lateral wall and inferolateral wall.,  There is no disease in the native circulation other than  mild luminal irregularities  4.  RCA small, codominant, there is ostial 50% narrowing, diffuse atherosclerotic disease of 30 to 40% but no focal stenotic lesion or flow limitation, small vessel disease distally is seen but not amenable to intervention secondary to small caliber vessel size in the PLV and PDA    Patent SVG to obtuse marginal, patent stents in this as described, concern over anastomotic disease and narrowing given the large myocardial supply of the entire lateral wall however there is FILIPE-3 flow antegrade and retrogradely,    Widely patent LIMA to LAD with no disease    Conclusions recommendations  1.  Anginal chest pain, uncontrolled blood pressure with stage II hypertension, today was 180 systolic on entering the Cath Lab, even while sleeping was above 165 systolic  2.  SVG to obtuse marginal branch has anastomotic narrowing but does not appear critical but does have a extremely large myocardial supply of the entire lateral wall    Intervention to the anastomosis of the SVG to obtuse marginal branch with jailed the retrograde limb back to the circumflex and to the inferolateral wall with L PLV segment in the AV groove which is sizable as well as likely the superior limb of the obtuse marginal, there is a high chance of diminishing graft functionality over time in this location as well as high chance of in-stent restenosis at this anastomotic location    We will discharge him today, advance his antianginals as well as his antihypertensives including his amlodipine, start Imdur.  We will bring him back in 2 to 4 weeks for Lexiscan stress to evaluate regional ischemia of the lateral wall if present will reevaluate best optimal strategy for lateral wall revascularization or control of his angina once blood pressure is also controlled    Patient is amenable for this conservative approach  He will be continued on aspirin and Plavix in addition to high intensity statin therapy, no beta-blocker secondary  to heart rates in the 60s    Paul White MD, PhD    The following portions of the patient's history were reviewed and updated as appropriate: allergies, current medications, past family history, past medical history, past social history, past surgical history, and problem list.      ROS:  14 point review of systems negative except as mentioned above    Current Outpatient Medications:     amLODIPine (NORVASC) 5 MG tablet, Take 1 tablet by mouth Daily., Disp: 30 tablet, Rfl: 5    aspirin 81 MG tablet, Take 1 tablet by mouth Daily., Disp: , Rfl:     clopidogrel (PLAVIX) 75 MG tablet, Take 1 tablet by mouth Daily., Disp: 30 tablet, Rfl: 11    gemfibrozil (LOPID) 600 MG tablet, Take 1 tablet by mouth once daily, Disp: 90 tablet, Rfl: 0    glipiZIDE (GLUCOTROL) 5 MG tablet, 0.5 tablets Daily., Disp: , Rfl:     isosorbide mononitrate (IMDUR) 30 MG 24 hr tablet, Take 1 tablet by mouth Daily., Disp: 30 tablet, Rfl: 5    losartan (COZAAR) 25 MG tablet, Take 1 tablet by mouth once daily, Disp: 30 tablet, Rfl: 0    metFORMIN ER (GLUCOPHAGE-XR) 500 MG 24 hr tablet, 2 tablets Daily With Breakfast., Disp: , Rfl:     nitroglycerin (NITROSTAT) 0.4 MG SL tablet, Place 1 tablet under the tongue Every 5 (Five) Minutes As Needed for Chest Pain. Prn chest pain, Disp: 100 tablet, Rfl: 3    Problem List:  Patient Active Problem List   Diagnosis    Bilateral carotid artery stenosis    Bradycardia    Coronary artery disease    Status post coronary artery bypass graft    Essential hypertension    Hyperlipidemia, mixed    Type 2 diabetes mellitus    Body fluid retention    Angina pectoris    Acute poliomyelitis    H/O: pneumonia    History of CEA (carotid endarterectomy)    S/P PTCA (percutaneous transluminal coronary angioplasty)     Past Medical History:  Past Medical History:   Diagnosis Date    Coronary artery disease     Diabetes mellitus     Hyperlipidemia     Hypertension      Past Surgical History:  Past Surgical History:    Procedure Laterality Date    CARDIAC CATHETERIZATION          CARDIAC CATHETERIZATION N/A 08/10/2022    Procedure: Left Heart Cath;  Surgeon: Palu White MD;  Location: [facility] CATH INVASIVE LOCATION;  Service: Cardiology;  Laterality: N/A;    CAROTID ENDARTERECTOMY      CAROTID STENT      PCI  to SVG supplying the LC/OM    CORONARY ARTERY BYPASS GRAFT          CORONARY STENT PLACEMENT       Social History:  Social History     Socioeconomic History    Marital status:    Tobacco Use    Smoking status: Former     Packs/day: 1.00     Years: 25.00     Pack years: 25.00     Types: Cigarettes     Quit date: 1998     Years since quittin.7    Smokeless tobacco: Never   Vaping Use    Vaping Use: Never used   Substance and Sexual Activity    Alcohol use: Never    Drug use: Never    Sexual activity: Yes     Partners: Female     Birth control/protection: Post-menopausal     Allergies:  Allergies   Allergen Reactions    Statins Cough     Severe muscle aches/spasms     Immunizations:  Immunization History   Administered Date(s) Administered    Pneumococcal Polysaccharide (PPSV23) 2017    TD Preservative Free (Tenivac) 2015            In-Office Procedure(s):  No orders to display        ASCVD RIsk Score::  The ASCVD Risk score (Timo SAL, et al., 2019) failed to calculate for the following reasons:    Cannot find a previous HDL lab    Cannot find a previous total cholesterol lab    Imaging:                 Lab Review:   Hospital Outpatient Visit on 2023   Component Date Value    Target HR (85%) 2023 125     Max. Pred. HR (100%) 2023 147     Prox CCA PSV 2023 -68.6     Prox CCA EDV 2023 -10.4     Dist CCA PSV 2023 63.9     Dist CCA EDV 2023 9.8     Prox ICA PSV 2023 -124.0     Prox ICA EDV 2023 -15.6     Dist ICA PSV 2023 -52.1     Dist ICA EDV 2023 -18.1     Prox ECA PSV 2023 113.0     Vertebral A PSV  03/29/2023 -37.9     Vertebral A EDV 03/29/2023 -9.9     Prox SCLA PSV 03/29/2023 134.0     ICA/CCA ratio 03/29/2023 1.81     Prox CCA PSV 03/29/2023 41.3     Prox CCA EDV 03/29/2023 8.1     Dist CCA PSV 03/29/2023 68.9     Dist CCA EDV 03/29/2023 15.8     Prox ICA PSV 03/29/2023 -79.0     Prox ICA EDV 03/29/2023 -14.8     Dist ICA PSV 03/29/2023 -79.6     Dist ICA EDV 03/29/2023 -16.5     Prox ECA PSV 03/29/2023 140.0     Vertebral A PSV 03/29/2023 28.5     Vertebral A EDV 03/29/2023 9.7     Prox SCLA PSV 03/29/2023 212.0     ICA/CCA ratio 03/29/2023 -1.16     Right arm BP 03/29/2023 143/71     Left arm BP 03/29/2023 146/76      Recent labs reviewed and interpreted for clinical significance and application            Level of Care:           Paul White MD  09/20/23  .

## 2023-10-04 ENCOUNTER — HOSPITAL ENCOUNTER (OUTPATIENT)
Dept: MRI IMAGING | Facility: HOSPITAL | Age: 73
Discharge: HOME | End: 2023-10-04
Payer: MEDICARE

## 2023-10-04 ENCOUNTER — TELEPHONE (OUTPATIENT)
Dept: NEUROLOGY | Facility: CLINIC | Age: 73
End: 2023-10-04
Payer: MEDICARE

## 2023-10-04 ENCOUNTER — LAB (OUTPATIENT)
Dept: LAB | Facility: HOSPITAL | Age: 73
End: 2023-10-04
Payer: MEDICARE

## 2023-10-04 DIAGNOSIS — R41.3 MEMORY LOSS: ICD-10-CM

## 2023-10-04 LAB
CREAT BLDA-MCNC: 0.9 MG/DL (ref 0.6–1.3)
EGFRCR SERPLBLD CKD-EPI 2021: 90.2 ML/MIN/1.73
FOLATE SERPL-MCNC: 16.6 NG/ML (ref 4.78–24.2)
TSH SERPL DL<=0.05 MIU/L-ACNC: 3.89 UIU/ML (ref 0.27–4.2)
VIT B12 BLD-MCNC: 392 PG/ML (ref 211–946)

## 2023-10-04 PROCEDURE — 82746 ASSAY OF FOLIC ACID SERUM: CPT

## 2023-10-04 PROCEDURE — 84443 ASSAY THYROID STIM HORMONE: CPT

## 2023-10-04 PROCEDURE — A9579 GAD-BASE MR CONTRAST NOS,1ML: HCPCS | Performed by: PSYCHIATRY & NEUROLOGY

## 2023-10-04 PROCEDURE — 82652 VIT D 1 25-DIHYDROXY: CPT

## 2023-10-04 PROCEDURE — 25010000002 GADOTERIDOL PER 1 ML: Performed by: PSYCHIATRY & NEUROLOGY

## 2023-10-04 PROCEDURE — 84446 ASSAY OF VITAMIN E: CPT

## 2023-10-04 PROCEDURE — 70553 MRI BRAIN STEM W/O & W/DYE: CPT

## 2023-10-04 PROCEDURE — 82565 ASSAY OF CREATININE: CPT

## 2023-10-04 PROCEDURE — 82525 ASSAY OF COPPER: CPT

## 2023-10-04 PROCEDURE — 36415 COLL VENOUS BLD VENIPUNCTURE: CPT

## 2023-10-04 PROCEDURE — 84207 ASSAY OF VITAMIN B-6: CPT

## 2023-10-04 PROCEDURE — 86780 TREPONEMA PALLIDUM: CPT

## 2023-10-04 PROCEDURE — 82607 VITAMIN B-12: CPT

## 2023-10-04 RX ADMIN — GADOTERIDOL 19 ML: 279.3 INJECTION, SOLUTION INTRAVENOUS at 09:35

## 2023-10-04 NOTE — TELEPHONE ENCOUNTER
Mri brain show an old right parietal temporal stroke which was not present on the ct head in 2015, also mild microvascular disease.  Strokes and microvascular disease can effect the memory.     Ask if he was ever aware of having a stroke?    The carotid doppler test was normal last spring    Right internal carotid artery demonstrates normal flow without evidence of hemodynamically significant stenosis.    Left internal carotid artery demonstrates a less than 50% stenosis.       risk factors for stroke include high blood pressure, diabetes     To reduce risk of further stroke general the same as to reduce risk of coronary artery disease    BP goal <135/80, control the blood sugar , continue asa and Plavix,

## 2023-10-05 LAB — T PALLIDUM AB SER QL IF: NON REACTIVE

## 2023-10-06 LAB — 1,25(OH)2D SERPL-MCNC: 34.1 PG/ML (ref 24.8–81.5)

## 2023-10-09 LAB
A-TOCOPHEROL VIT E SERPL-MCNC: 10 MG/L (ref 9–29)
GAMMA TOCOPHEROL SERPL-MCNC: 1.8 MG/L (ref 0.5–4.9)
PYRIDOXAL PHOS SERPL-MCNC: 17.1 UG/L (ref 3.4–65.2)

## 2023-10-10 RX ORDER — LOSARTAN POTASSIUM 25 MG/1
TABLET ORAL
Qty: 30 TABLET | Refills: 0 | Status: SHIPPED | OUTPATIENT
Start: 2023-10-10

## 2023-10-12 LAB — COPPER SERPL-MCNC: 97 UG/DL (ref 69–132)

## 2023-11-07 RX ORDER — LOSARTAN POTASSIUM 25 MG/1
TABLET ORAL
Qty: 90 TABLET | Refills: 3 | Status: SHIPPED | OUTPATIENT
Start: 2023-11-07

## 2023-11-20 RX ORDER — GEMFIBROZIL 600 MG/1
TABLET, FILM COATED ORAL
Qty: 90 TABLET | Refills: 0 | Status: SHIPPED | OUTPATIENT
Start: 2023-11-20

## 2023-12-05 RX ORDER — AMLODIPINE BESYLATE 5 MG/1
5 TABLET ORAL DAILY
Qty: 30 TABLET | Refills: 0 | Status: SHIPPED | OUTPATIENT
Start: 2023-12-05

## 2024-01-17 RX ORDER — AMLODIPINE BESYLATE 5 MG/1
5 TABLET ORAL DAILY
Qty: 30 TABLET | Refills: 0 | Status: SHIPPED | OUTPATIENT
Start: 2024-01-17

## 2024-03-05 RX ORDER — CLOPIDOGREL BISULFATE 75 MG/1
75 TABLET ORAL DAILY
Qty: 30 TABLET | Refills: 0 | Status: SHIPPED | OUTPATIENT
Start: 2024-03-05

## 2024-03-05 RX ORDER — AMLODIPINE BESYLATE 5 MG/1
5 TABLET ORAL DAILY
Qty: 30 TABLET | Refills: 0 | Status: SHIPPED | OUTPATIENT
Start: 2024-03-05

## 2024-04-23 RX ORDER — CLOPIDOGREL BISULFATE 75 MG/1
75 TABLET ORAL DAILY
Qty: 30 TABLET | Refills: 0 | Status: SHIPPED | OUTPATIENT
Start: 2024-04-23

## 2024-04-23 RX ORDER — AMLODIPINE BESYLATE 5 MG/1
5 TABLET ORAL DAILY
Qty: 30 TABLET | Refills: 0 | Status: SHIPPED | OUTPATIENT
Start: 2024-04-23

## 2024-05-29 RX ORDER — AMLODIPINE BESYLATE 5 MG/1
5 TABLET ORAL DAILY
Qty: 30 TABLET | Refills: 0 | Status: SHIPPED | OUTPATIENT
Start: 2024-05-29

## 2024-05-29 RX ORDER — GEMFIBROZIL 600 MG/1
TABLET, FILM COATED ORAL
Qty: 90 TABLET | Refills: 0 | Status: SHIPPED | OUTPATIENT
Start: 2024-05-29

## 2024-06-12 RX ORDER — CLOPIDOGREL BISULFATE 75 MG/1
75 TABLET ORAL DAILY
Qty: 30 TABLET | Refills: 0 | Status: SHIPPED | OUTPATIENT
Start: 2024-06-12

## 2024-06-24 RX ORDER — AMLODIPINE BESYLATE 5 MG/1
5 TABLET ORAL DAILY
Qty: 30 TABLET | Refills: 0 | Status: SHIPPED | OUTPATIENT
Start: 2024-06-24

## 2024-06-26 ENCOUNTER — OFFICE VISIT (OUTPATIENT)
Dept: CARDIOLOGY | Facility: CLINIC | Age: 74
End: 2024-06-26
Payer: MEDICARE

## 2024-06-26 VITALS
RESPIRATION RATE: 18 BRPM | DIASTOLIC BLOOD PRESSURE: 68 MMHG | SYSTOLIC BLOOD PRESSURE: 133 MMHG | HEART RATE: 60 BPM | BODY MASS INDEX: 31.99 KG/M2 | WEIGHT: 192 LBS | HEIGHT: 65 IN

## 2024-06-26 DIAGNOSIS — R09.89 RIGHT CAROTID BRUIT: Primary | ICD-10-CM

## 2024-06-26 DIAGNOSIS — R07.89 CHEST PAIN, ATYPICAL: ICD-10-CM

## 2024-06-26 DIAGNOSIS — R06.09 DOE (DYSPNEA ON EXERTION): ICD-10-CM

## 2024-06-26 RX ORDER — ISOSORBIDE MONONITRATE 60 MG/1
60 TABLET, EXTENDED RELEASE ORAL DAILY
COMMUNITY

## 2024-06-27 NOTE — PROGRESS NOTES
Cardiology Clinic Note  Paul White MD, PhD    Subjective:     Encounter Date:06/26/2024      Patient ID: Tye Avilez is a 73 y.o. male.    Chief Complaint:  Chief Complaint   Patient presents with    Follow-up       HPI:        I had the pleasure to see this 73-year-old in clinic today in follow-up.  He has history of bypass surgery remotely 1998, PCI to the distal portion of the saphenous vein graft to a bifurcating obtuse marginal branch 2011 and repeat in 2022, hypertensive heart disease hyperlipidemia diabetes.  Secondary to recurrent chest pain after intervention 2022  patient underwent invasive ischemic evaluation demonstrating widely patent MCNEILL to LAD, patent native RCA small caliber mild luminal regularities diffusely but otherwise patent with FILIPE-3 flow, saphenous vein graft to obtuse marginal appears to have 50% aorto ostial takeoff narrowing but with adequate flow and no pressure diminution on catheter engagement, there is nonobstructive disease 30 to 40% diffusely throughout, the distal anastomotic portion of this graft has a stent with 40% in-stent restenosis however the distal edge has significant 70 to 80 perhaps even 90 % stenosis at the anastomosis into a bifurcating obtuse marginal branch of which the superior limb has 90% stenosis most severely in the view, the inferior limb 70% and are both small caliber vessels 2 to 2.5 mm in diameter.  There is also retrograde perfusion through the ascending/ retrograde limb of the obtuse marginal back to the circumflex which gives PLV branches along the inferolateral wall.  The left main is completely occluded providing no collateral flow and he is completely dependent on his vein graft anastomosis for the lateral wall.   Diagonal has retrograde perfusion for LIMA to LAD which was widely patent again.  He has preserved EF at 60 to 65%.  Given difficulty with IFR and FFR measurements given small distal caliber size multiple runoff distributions  ultimately he underwent nuclear perfusion study demonstrating reversibility of the lateral and inferolateral wall under stress conditions indicating significant contribution of distal anastomotic disease as well as superior and inferior limb proximal stenosis.  Stress September 2020 demonstrated lateral and inferolateral wall ischemia indicating vein graft significantly stenosed with physiologic limitation of myocardial supply.  Subsequently,  Patient was evaluated by [facility] and underwent cardiac catheterization on November 23, 2022.  He underwent IVUS guided  revascularization and PCI of the left circumflex with overlapping 3.0 x 15 mm, 3.5 x 33 mm, and 4.0 x 15 mm Xience nawaf point drug-eluting stents.       Patient has done well over the last couple of years however he presents back today with dyspnea on exertion and chest heaviness and exertional angina.  He has started over the last few weeks not quite as bad as he formerly experienced but of similar quality and location.  On exam he also has a left carotid bruit notably.  No fevers chill sweats nausea vomiting or diarrhea     Exam  Vitals reviewed below  Regular rate and rhythm with no rubs gallops heave or lift, 1 out of 6 systolic ejection murmur left sternal border stable  Left greater than right carotid bruit present on exam  No JVD HJR,   No edema  No clubbing cyanosis  Intact grossly  Clear to auscultation  Soft nontender nondistended        Assessment and plan today  Multivessel CAD  Status post left main and to circumflex  revascularization [facility]  Essential hypertension  PVD with carotid bruits  Hyperlipidemia  Statin intolerance  History of chronic stable angina  Type 2 diabetes     Return of angina  Treadmill stress nuclear imaging for risk stratification  Carotid Dopplers evaluate left carotid bruit     Status post  revascularization left main and circumflex, [facility] 2022 continue aspirin Plavix  Diabetes  "control  Nontolerant of his statin therapy  On gemfibrozil as well as bempedoic acid Zetia, PCSK9 inhibitor was not tolerated nor statin therapy     2022 carotid Dopplers demonstrate right carotid without any significant disease, left less than 50% stenosis, indication was prior bruits left greater than right     Blood pressures well controlled, losartan 25 daily, amlodipine 5 daily, beta-blocker with heart rates in the low 60s upper 50s     Patient on nitrates, beta-blockers calcium channel blockers already as antianginals no further titration able     Secondary prevention goals  Diet and exercise per AHA guidelines  Goal-directed medical therapy on with antiplatelet therapy, lipid-lowering therapy per guidelines, afterload reduction, diabetes treatment    Follow-up ischemic testing if abnormal will need repeat left catheterization to evaluate for in-stent restenosis    Objective:         /68 (BP Location: Right arm, Patient Position: Sitting)   Pulse 60   Resp 18   Ht 165.1 cm (65\")   Wt 87.1 kg (192 lb)   BMI 31.95 kg/m²     Diagnoses and all orders for this visit:    1. Right carotid bruit (Primary)  -     Duplex Carotid Ultrasound CAR; Future    2. GUTIERREZ (dyspnea on exertion)  -     Stress Test With Myocardial Perfusion One Day; Future    3. Chest pain, atypical  -     Stress Test With Myocardial Perfusion One Day; Future          The pleasure to be involved in this patient's cardiovascular care.  Please call with any questions or concerns  Paul White MD, PhD    Most recent EKG as reviewed and interpreted by me:  Procedures     Most recent echo as reviewed and interpreted by me:      Most recent stress test as reviewed and interpreted by me:  Results for orders placed during the hospital encounter of 09/14/22    Stress Test With Myocardial Perfusion (1 Day)    Interpretation Summary  · Left ventricular ejection fraction is mildly reduced. (Calculated EF = 44%).  · Myocardial perfusion imaging " indicates a medium-sized, moderately severe area of ischemia located in the lateral wall.  · Impressions are consistent with a high risk study.  · Study demonstrates reversibility L4-5 segments of the myocardium mainly located in the lateral and inferolateral wall consistent with ischemia EF by gated imaging 46 to 50% mildly reduced.  · Findings consistent with an equivocal ECG stress test.    Abnormal study  Stress ECG did not restarted heart rate, there were nonspecific changes at submaximal stress in the inferolateral leads not meeting diagnostic criteria for ST depression truly  No arrhythmias seen  Sinus rhythm  Nuclear imaging with moderate amount of reversibility indicative of ischemia in the lateral and inferolateral wall, EF 46% to 50% by gated imaging    Abnormal study  Lateral inferolateral ischemia s indicated by the present study,      Most recent cardiac catheterization as reviewed interpreted by me:  Results for orders placed during the hospital encounter of 08/10/22    Cardiac Catheterization/Vascular Study    Narrative   Paul White MD, PhD  [facility]  Date of service 8-    Procedure  1.  Left heart catheterization with coronary angiography of native and bypass grafts, LV gram    Indication  Recurrent anginal chest pain    After informed consent patient is brought to the Cath Lab sterilely prepped and draped in usual fashion expose the right groin for right common femoral arterial access via micropuncture modified Seldinger technique with placement of a 6 [place] sheath.  035 guidewire was advanced aortic valve followed by diagnostic JL 4 and JR4 catheters for selective left and right coronary angiography as well as bypass graft with SVG to OM and LIMA to LAD.  JR4 was used across aortic valve followed by hand-injection for LV gram, EDP assessment and pullback assessment of the transaortic valve gradient.  All catheters equipment removed.  Sheath flushed with  heparinized saline to be removed by manual pressure.  He left Cath Lab chest pain-free hemodynamically electrically stable and talking to staff neurologically grossly tight bilaterally    Findings  1 opening aortic pressure was 180/97  2.  Closing pressure was 162/87  3.  LVEDP 10-12  4.  LV function low normal 50 to 55% globally  5.  Normal transaortic valve gradient    Angiography  1 left main   Over 2 LAD proximally , perfused by widely patent LIMA to LAD with retrograde flow widely patent flow retrogradely to the diagonal branch across the anterolateral wall, there is no anastomotic disease, no downstream disease or even retrograde disease significantly with only mild diffuse luminal regularities, highly functional LIMA to LAD graft  3.  Circumflex ostial proximal , perfused by SVG to second obtuse marginal branch.  There is a patent stent in the proximal portion with ostial 50% narrowing in a very large caliber graft, there is copious contrast E flux surrounding the diagnostic catheter from the ostial portion of the graft into the aorta, there is diffuse luminal regularities throughout the graft, mild degeneration 20 to 30%, there is a patent stent to the distal portion of the graft just immediately prior to anastomosis there is otherwise widely patent.  Immediately after this stent terminates there is a short 60% to 70% narrowing at the anastomosis of the graft to the native coronary artery.,  There is retrograde flow from the anastomosis to the 2 obtuse marginal branches superior and inferior limbs as well as retrogradely to the circumflex as well as in the AV groove for continuation circumflex and L PLV branch.  This single graft supplies the entire lateral wall and inferolateral wall.,  There is no disease in the native circulation other than mild luminal irregularities  4.  RCA small, codominant, there is ostial 50% narrowing, diffuse atherosclerotic disease of 30 to 40% but no focal stenotic  lesion or flow limitation, small vessel disease distally is seen but not amenable to intervention secondary to small caliber vessel size in the PLV and PDA    Patent SVG to obtuse marginal, patent stents in this as described, concern over anastomotic disease and narrowing given the large myocardial supply of the entire lateral wall however there is FILIPE-3 flow antegrade and retrogradely,    Widely patent LIMA to LAD with no disease    Conclusions recommendations  1.  Anginal chest pain, uncontrolled blood pressure with stage II hypertension, today was 180 systolic on entering the Cath Lab, even while sleeping was above 165 systolic  2.  SVG to obtuse marginal branch has anastomotic narrowing but does not appear critical but does have a extremely large myocardial supply of the entire lateral wall    Intervention to the anastomosis of the SVG to obtuse marginal branch with jailed the retrograde limb back to the circumflex and to the inferolateral wall with L PLV segment in the AV groove which is sizable as well as likely the superior limb of the obtuse marginal, there is a high chance of diminishing graft functionality over time in this location as well as high chance of in-stent restenosis at this anastomotic location    We will discharge him today, advance his antianginals as well as his antihypertensives including his amlodipine, start Imdur.  We will bring him back in 2 to 4 weeks for Lexiscan stress to evaluate regional ischemia of the lateral wall if present will reevaluate best optimal strategy for lateral wall revascularization or control of his angina once blood pressure is also controlled    Patient is amenable for this conservative approach  He will be continued on aspirin and Plavix in addition to high intensity statin therapy, no beta-blocker secondary to heart rates in the 60s    Paul White MD, PhD    The following portions of the patient's history were reviewed and updated as appropriate: allergies,  current medications, past family history, past medical history, past social history, past surgical history, and problem list.      ROS:  14 point review of systems negative except as mentioned above    Current Outpatient Medications:     amLODIPine (NORVASC) 5 MG tablet, Take 1 tablet by mouth once daily, Disp: 30 tablet, Rfl: 0    aspirin 81 MG tablet, Take 1 tablet by mouth Daily., Disp: , Rfl:     Bempedoic Acid-Ezetimibe 180-10 MG tablet, Take 1 tablet by mouth Daily., Disp: 30 tablet, Rfl: 11    clopidogrel (PLAVIX) 75 MG tablet, Take 1 tablet by mouth once daily, Disp: 30 tablet, Rfl: 0    gemfibrozil (LOPID) 600 MG tablet, Take 1 tablet by mouth once daily, Disp: 90 tablet, Rfl: 0    glipiZIDE (GLUCOTROL) 5 MG tablet, 0.5 tablets Daily., Disp: , Rfl:     isosorbide mononitrate (IMDUR) 60 MG 24 hr tablet, Take 1 tablet by mouth Daily., Disp: , Rfl:     losartan (COZAAR) 25 MG tablet, Take 1 tablet by mouth once daily, Disp: 90 tablet, Rfl: 3    metFORMIN ER (GLUCOPHAGE-XR) 500 MG 24 hr tablet, 2 tablets Daily With Breakfast., Disp: , Rfl:     nitroglycerin (NITROSTAT) 0.4 MG SL tablet, Place 1 tablet under the tongue Every 5 (Five) Minutes As Needed for Chest Pain. Prn chest pain, Disp: 100 tablet, Rfl: 3    Problem List:  Patient Active Problem List   Diagnosis    Bilateral carotid artery stenosis    Bradycardia    Coronary artery disease    Status post coronary artery bypass graft    Essential hypertension    Hyperlipidemia, mixed    Type 2 diabetes mellitus    Body fluid retention    Angina pectoris    Acute poliomyelitis    H/O: pneumonia    History of CEA (carotid endarterectomy)    S/P PTCA (percutaneous transluminal coronary angioplasty)     Past Medical History:  Past Medical History:   Diagnosis Date    Coronary artery disease     Diabetes mellitus     Hyperlipidemia     Hypertension      Past Surgical History:  Past Surgical History:   Procedure Laterality Date    CARDIAC CATHETERIZATION      2015     CARDIAC CATHETERIZATION N/A 08/10/2022    Procedure: Left Heart Cath;  Surgeon: Paul White MD;  Location: [facility] CATH INVASIVE LOC[facility];  Service: Cardiology;  Laterality: N/A;    CAROTID ENDARTERECTOMY      CAROTID STENT      PCI  to SVG supplying the LC/OM    CORONARY ARTERY BYPASS GRAFT          CORONARY STENT PLACEMENT       Social History:  Social History     Socioeconomic History    Marital status:    Tobacco Use    Smoking status: Former     Current packs/day: 0.00     Average packs/day: 1 pack/day for 25.0 years (25.0 ttl pk-yrs)     Types: Cigarettes     Start date: 1973     Quit date: 1998     Years since quittin.5    Smokeless tobacco: Never   Vaping Use    Vaping status: Never Used   Substance and Sexual Activity    Alcohol use: Never    Drug use: Never    Sexual activity: Yes     Partners: Female     Birth control/protection: Post-menopausal     Allergies:  Allergies   Allergen Reactions    Statins Cough     Severe muscle aches/spasms     Immunizations:  Immunization History   Administered Date(s) Administered    Pneumococcal Polysaccharide (PPSV23) 2017    TD Preservative Free (Tenivac) 2015            In-Office Procedure(s):  No orders to display        ASCVD RIsk Score::  The ASCVD Risk score (Timo DK, et al., 2019) failed to calculate.    Imaging:                 Lab Review:   No visits with results within 6 Month(s) from this visit.   Latest known visit with results is:   Hospital Outpatient Visit on 10/04/2023   Component Date Value    Creatinine 10/04/2023 0.90     eGFR 10/04/2023 90.2      Recent labs reviewed and interpreted for clinical significance and application            Level of Care:           Paul White MD  24  .

## 2024-07-08 RX ORDER — CLOPIDOGREL BISULFATE 75 MG/1
75 TABLET ORAL DAILY
Qty: 90 TABLET | Refills: 3 | Status: SHIPPED | OUTPATIENT
Start: 2024-07-08

## 2024-07-12 ENCOUNTER — HOSPITAL ENCOUNTER (OUTPATIENT)
Dept: NUCLEAR MEDICINE | Facility: HOSPITAL | Age: 74
Discharge: HOME | End: 2024-07-12
Payer: MEDICARE

## 2024-07-12 ENCOUNTER — HOSPITAL ENCOUNTER (OUTPATIENT)
Dept: CARDIOLOGY | Facility: HOSPITAL | Age: 74
Discharge: HOME | End: 2024-07-12
Payer: MEDICARE

## 2024-07-12 DIAGNOSIS — R07.89 CHEST PAIN, ATYPICAL: ICD-10-CM

## 2024-07-12 DIAGNOSIS — R06.09 DOE (DYSPNEA ON EXERTION): ICD-10-CM

## 2024-07-12 DIAGNOSIS — R09.89 RIGHT CAROTID BRUIT: ICD-10-CM

## 2024-07-12 LAB
BH CV REST NUCLEAR ISOTOPE DOSE: 11 MCI
BH CV STRESS BP STAGE 1: NORMAL
BH CV STRESS BP STAGE 2: NORMAL
BH CV STRESS DURATION MIN STAGE 1: 3
BH CV STRESS DURATION MIN STAGE 2: 2
BH CV STRESS DURATION SEC STAGE 1: 0
BH CV STRESS DURATION SEC STAGE 2: 32
BH CV STRESS GRADE STAGE 1: 10
BH CV STRESS GRADE STAGE 2: 12
BH CV STRESS HR STAGE 1: 133
BH CV STRESS HR STAGE 2: 145
BH CV STRESS METS STAGE 1: 5
BH CV STRESS METS STAGE 2: 7.5
BH CV STRESS NUCLEAR ISOTOPE DOSE: 33 MCI
BH CV STRESS PROTOCOL 1: NORMAL
BH CV STRESS RECOVERY BP: NORMAL MMHG
BH CV STRESS RECOVERY HR: 79 BPM
BH CV STRESS SPEED STAGE 1: 1.7
BH CV STRESS SPEED STAGE 2: 2.5
BH CV STRESS STAGE 1: 1
BH CV STRESS STAGE 2: 2
BH CV XLRA MEAS LEFT CAROTID BULB PSV: -56.5 CM/SEC
BH CV XLRA MEAS LEFT DIST CCA EDV: -16.7 CM/SEC
BH CV XLRA MEAS LEFT DIST CCA PSV: -73.8 CM/SEC
BH CV XLRA MEAS LEFT DIST ICA EDV: -30.1 CM/SEC
BH CV XLRA MEAS LEFT DIST ICA PSV: -132 CM/SEC
BH CV XLRA MEAS LEFT ICA/CCA RATIO: 1.79
BH CV XLRA MEAS LEFT PROX CCA EDV: 9.7 CM/SEC
BH CV XLRA MEAS LEFT PROX CCA PSV: 45.2 CM/SEC
BH CV XLRA MEAS LEFT PROX ECA PSV: 109 CM/SEC
BH CV XLRA MEAS LEFT PROX ICA EDV: -20.9 CM/SEC
BH CV XLRA MEAS LEFT PROX ICA PSV: -92.2 CM/SEC
BH CV XLRA MEAS LEFT PROX SCLA PSV: 179 CM/SEC
BH CV XLRA MEAS LEFT VERTEBRAL A PSV: 36.1 CM/SEC
BH CV XLRA MEAS RIGHT CAROTID BULB PSV: -113 CM/SEC
BH CV XLRA MEAS RIGHT DIST CCA EDV: 13.2 CM/SEC
BH CV XLRA MEAS RIGHT DIST CCA PSV: 79 CM/SEC
BH CV XLRA MEAS RIGHT DIST ICA EDV: -24.2 CM/SEC
BH CV XLRA MEAS RIGHT DIST ICA PSV: -93.8 CM/SEC
BH CV XLRA MEAS RIGHT ICA/CCA RATIO: -1.44
BH CV XLRA MEAS RIGHT PROX CCA EDV: -7.8 CM/SEC
BH CV XLRA MEAS RIGHT PROX CCA PSV: -54.5 CM/SEC
BH CV XLRA MEAS RIGHT PROX ECA PSV: -96 CM/SEC
BH CV XLRA MEAS RIGHT PROX ICA EDV: -23.6 CM/SEC
BH CV XLRA MEAS RIGHT PROX ICA PSV: -114 CM/SEC
BH CV XLRA MEAS RIGHT PROX SCLA PSV: 87.8 CM/SEC
BH CV XLRA MEAS RIGHT VERTEBRAL A PSV: -49.1 CM/SEC
LV EF NUC BP: 36 %
MAXIMAL PREDICTED HEART RATE: 146 BPM
PERCENT MAX PREDICTED HR: 99.32 %
STRESS BASELINE BP: NORMAL MMHG
STRESS BASELINE HR: 55 BPM
STRESS PERCENT HR: 117 %
STRESS POST ESTIMATED WORKLOAD: 5.6 METS
STRESS POST EXERCISE DUR MIN: 5 MIN
STRESS POST EXERCISE DUR SEC: 32 SEC
STRESS POST PEAK BP: NORMAL MMHG
STRESS POST PEAK HR: 145 BPM
STRESS TARGET HR: 124 BPM

## 2024-07-12 PROCEDURE — 78452 HT MUSCLE IMAGE SPECT MULT: CPT

## 2024-07-12 PROCEDURE — A9502 TC99M TETROFOSMIN: HCPCS | Performed by: INTERNAL MEDICINE

## 2024-07-12 PROCEDURE — 0 TECHNETIUM TETROFOSMIN KIT: Performed by: INTERNAL MEDICINE

## 2024-07-12 PROCEDURE — 93017 CV STRESS TEST TRACING ONLY: CPT

## 2024-07-12 PROCEDURE — 93880 EXTRACRANIAL BILAT STUDY: CPT

## 2024-07-12 RX ADMIN — TETROFOSMIN 1 DOSE: 1.38 INJECTION, POWDER, LYOPHILIZED, FOR SOLUTION INTRAVENOUS at 09:23

## 2024-07-12 RX ADMIN — TETROFOSMIN 1 DOSE: 1.38 INJECTION, POWDER, LYOPHILIZED, FOR SOLUTION INTRAVENOUS at 08:15

## 2024-07-16 ENCOUNTER — TELEPHONE (OUTPATIENT)
Dept: CARDIOLOGY | Facility: CLINIC | Age: 74
End: 2024-07-16

## 2024-07-16 NOTE — TELEPHONE ENCOUNTER
Caller: ROBINSON CORONA    Relationship: Emergency Contact    Best call back number:  [phone]      PATIENTS WIFE CALLED IN FOR STRESS TEST RESULTS

## 2024-07-22 NOTE — TELEPHONE ENCOUNTER
Hub staff attempted to follow warm transfer process and was unsuccessful     Caller: ROBINSON CORONA    Relationship to patient: Emergency Contact    Best call back number: [phone]    Patient is needing: PT'S WIFE CALLED BACK. PLEASE TRY REACHING PT'S WIFE BACK WHEN POSSIBLE TO DISCUSS.

## 2024-07-23 RX ORDER — AMLODIPINE BESYLATE 5 MG/1
5 TABLET ORAL DAILY
Qty: 30 TABLET | Refills: 0 | Status: SHIPPED | OUTPATIENT
Start: 2024-07-23

## 2024-08-08 ENCOUNTER — OFFICE VISIT (OUTPATIENT)
Dept: CARDIOLOGY | Facility: CLINIC | Age: 74
End: 2024-08-08
Payer: MEDICARE

## 2024-08-08 VITALS
DIASTOLIC BLOOD PRESSURE: 77 MMHG | SYSTOLIC BLOOD PRESSURE: 152 MMHG | HEIGHT: 65 IN | WEIGHT: 193 LBS | HEART RATE: 56 BPM | RESPIRATION RATE: 18 BRPM | BODY MASS INDEX: 32.15 KG/M2

## 2024-08-08 DIAGNOSIS — I42.8 NON-ISCHEMIC CARDIOMYOPATHY: Primary | ICD-10-CM

## 2024-08-08 DIAGNOSIS — E78.2 HYPERLIPIDEMIA, MIXED: ICD-10-CM

## 2024-08-08 DIAGNOSIS — I10 ESSENTIAL HYPERTENSION: ICD-10-CM

## 2024-08-08 RX ORDER — LOSARTAN POTASSIUM 50 MG/1
50 TABLET ORAL DAILY
Qty: 90 TABLET | Refills: 3 | Status: SHIPPED | OUTPATIENT
Start: 2024-08-08

## 2024-08-08 RX ORDER — CLOPIDOGREL BISULFATE 75 MG/1
75 TABLET ORAL DAILY
Qty: 90 TABLET | Refills: 3 | Status: SHIPPED | OUTPATIENT
Start: 2024-08-08

## 2024-08-08 RX ORDER — ISOSORBIDE MONONITRATE 60 MG/1
60 TABLET, EXTENDED RELEASE ORAL DAILY
Qty: 90 TABLET | Refills: 3 | Status: SHIPPED | OUTPATIENT
Start: 2024-08-08

## 2024-08-08 RX ORDER — AMLODIPINE BESYLATE 5 MG/1
5 TABLET ORAL DAILY
Qty: 90 TABLET | Refills: 3 | Status: SHIPPED | OUTPATIENT
Start: 2024-08-08

## 2024-08-08 RX ORDER — ISOSORBIDE MONONITRATE 60 MG/1
60 TABLET, EXTENDED RELEASE ORAL DAILY
Qty: 90 TABLET | Refills: 3 | Status: SHIPPED | OUTPATIENT
Start: 2024-08-08 | End: 2024-08-08

## 2024-08-08 RX ORDER — ASPIRIN 81 MG/1
81 TABLET ORAL DAILY
Qty: 90 TABLET | Refills: 3 | Status: SHIPPED | OUTPATIENT
Start: 2024-08-08

## 2024-08-08 NOTE — PROGRESS NOTES
Cardiology Clinic Note  Paul White MD, PhD    Subjective:     Encounter Date:08/08/2024      Patient ID: Tye Avilez is a 73 y.o. male.    Chief Complaint:  Chief Complaint   Patient presents with    Follow-up       HPI:        I had the pleasure to see this 73-year-old in clinic today in follow-up.  He has history of bypass surgery remotely 1998, PCI to the distal portion of the saphenous vein graft to a bifurcating obtuse marginal branch 2011 and repeat in 2022, hypertensive heart disease hyperlipidemia diabetes.  Secondary to recurrent chest pain after intervention 2022  patient underwent invasive ischemic evaluation demonstrating widely patent MCNEILL to LAD, patent native RCA small caliber mild luminal regularities diffusely but otherwise patent with FILIPE-3 flow, saphenous vein graft to obtuse marginal appears to have 50% aorto ostial takeoff narrowing but with adequate flow and no pressure diminution on catheter engagement, there is nonobstructive disease 30 to 40% diffusely throughout, the distal anastomotic portion of this graft has a stent with 40% in-stent restenosis however the distal edge has significant 70 to 80 perhaps even 90 % stenosis at the anastomosis into a bifurcating obtuse marginal branch of which the superior limb has 90% stenosis most severely in the view, the inferior limb 70% and are both small caliber vessels 2 to 2.5 mm in diameter.  There is also retrograde perfusion through the ascending/ retrograde limb of the obtuse marginal back to the circumflex which gives PLV branches along the inferolateral wall.  The left main is completely occluded providing no collateral flow and he is completely dependent on his vein graft anastomosis for the lateral wall.   Diagonal has retrograde perfusion for LIMA to LAD which was widely patent again.  He has preserved EF at 60 to 65%.  Given difficulty with IFR and FFR measurements given small distal caliber size multiple runoff distributions  ultimately he underwent nuclear perfusion study demonstrating reversibility of the lateral and inferolateral wall under stress conditions indicating significant contribution of distal anastomotic disease as well as superior and inferior limb proximal stenosis.  Stress September 2020 demonstrated lateral and inferolateral wall ischemia indicating vein graft significantly stenosed with physiologic limitation of myocardial supply.  Subsequently,  Patient was evaluated by [facility] and underwent cardiac catheterization on November 23, 2022.  He underwent IVUS guided  revascularization and PCI of the left circumflex with overlapping 3.0 x 15 mm, 3.5 x 33 mm, and 4.0 x 15 mm Xience nawaf point drug-eluting stents.        Patient has done well over the last couple of years however he presented last clinic back with dyspnea on exertion and chest heaviness and exertional angina.  He has started over the last few weeks not quite as bad as he formerly experienced but of similar quality and location.  On exam he also has a left carotid bruit notably.  No fevers chill sweats nausea vomiting or diarrhea  Follow-up stress testing with no reversible ischemia, lateral wall infarct but EF was reduced at 32% however he had severely hypertensive response.  We discussed we verification with 2D echo for EF and escalation of his pharmacotherapy if EF is now down from 45 to 50% from 2022.  No peripheral edema PND orthopnea, chest pain is stable.  He continues on maximal pharmacotherapy today without new issues.  H     Exam  Vitals reviewed below  Regular rate and rhythm with no rubs gallops heave or lift, 1 out of 6 systolic ejection murmur left sternal border stable  Left greater than right carotid bruit present on exam  No JVD HJR,   No edema  No clubbing cyanosis  Intact grossly  Clear to auscultation  Soft nontender nondistended        Assessment and plan today  Multivessel CAD  Status post left main and to circumflex   "revascularization [facility] [place]  Essential hypertension  PVD with carotid bruits  Hyperlipidemia  Statin intolerance  History of chronic stable angina  Type 2 diabetes     Return of angina on previous encounter  Follow-up stress July 2024 revealed EF of 36% with fixed mid to basal lateral wall defect without reversibility cannot rule out lateral wall infarct, global hypokinesis, severely hypertensive response up to 220 systolic, stress ECG with no ischemic ST-T wave findings at submaximal stress    Follow-up echo has been ordered, stress EF 36% which is down from 45 to 50% previously, possibly hypertension related    Carotid Dopplers 2024 evaluate left carotid bruit, less than 50% stenosis, status post right carotid endarterectomy with no recurrent disease     Status post  revascularization left main and circumflex, [facility] 2022 continue aspirin Plavix  Diabetes control    On gemfibrozil , does not want PCSK9's, did not tolerate bempedoic acid    Follow-up fasting lipid panel along with of the yearly labs, renal function electrolytes BMP TSH UA and share with primary care     Blood pressures up today 150 systolic, increase losartan 50 daily, amlodipine 5 daily, off beta-blocker with heart rates in the low 60s upper 50s     Patient on nitrates,  calcium channel blockers already as antianginals no further titration able     Secondary prevention goals  Diet and exercise per AHA guidelines  Goal-directed medical therapy on with antiplatelet therapy, lipid-lowering therapy per guidelines, afterload reduction, diabetes treatment         Objective:         /77 (BP Location: Left arm, Patient Position: Sitting)   Pulse 56   Resp 18   Ht 165.1 cm (65\")   Wt 87.5 kg (193 lb)   BMI 32.12 kg/m²     Physical Exam    Assessment:         Diagnoses and all orders for this visit:    1. Non-ischemic cardiomyopathy (Primary)  -     Adult Transthoracic Echo Complete W/ Color, Spectral and Contrast if Necessary " Per Protocol; Future  -     Lipid Panel; Future  -     Comprehensive Metabolic Panel; Future  -     CBC (No Diff); Future  -     TSH; Future  -     BNP; Future  -     Magnesium; Future  -     Urinalysis With Microscopic - Urine, Clean Catch; Future    2. Essential hypertension  -     Comprehensive Metabolic Panel; Future  -     CBC (No Diff); Future  -     TSH; Future  -     BNP; Future  -     Magnesium; Future  -     Urinalysis With Microscopic - Urine, Clean Catch; Future    3. Hyperlipidemia, mixed  -     Lipid Panel; Future    Other orders  -     amLODIPine (NORVASC) 5 MG tablet; Take 1 tablet by mouth Daily.  Dispense: 90 tablet; Refill: 3  -     aspirin 81 MG EC tablet; Take 1 tablet by mouth Daily.  Dispense: 90 tablet; Refill: 3  -     clopidogrel (PLAVIX) 75 MG tablet; Take 1 tablet by mouth Daily.  Dispense: 90 tablet; Refill: 3  -     isosorbide mononitrate (IMDUR) 60 MG 24 hr tablet; Take 1 tablet by mouth Daily.  Dispense: 90 tablet; Refill: 3  -     losartan (COZAAR) 50 MG tablet; Take 1 tablet by mouth Daily.  Dispense: 90 tablet; Refill: 3           Plan:              The pleasure to be involved in this patient's cardiovascular care.  Please call with any questions or concerns  Paul White MD, PhD    Most recent EKG as reviewed and interpreted by me:  Procedures     Most recent echo as reviewed and interpreted by me:      Most recent stress test as reviewed and interpreted by me:  Results for orders placed during the hospital encounter of 07/12/24    Stress Test With Myocardial Perfusion One Day    Interpretation Summary    Left ventricular ejection fraction is moderately reduced (Calculated EF = 36%).    Abnormal LV wall motion consistent with severe hypokinesis and global hypokinesis.    Myocardial perfusion imaging indicates a small-to-moderate-sized infarct located in the lateral wall with no significant ischemia noted.    There is no prior study available for comparison. There is fixed mid to  basal lateral wall defect without significant reversibility, cannot r/o small prior lateral infarct EF globally reduced 36% with enlarged ventricle.    Findings consistent with a normal ECG stress test.    Possible old moderate middle lateral wall infarct, no significant reversibility identified in this region  Severely hypertensive response greater than 220 systolic  Globally reduced EF 36%  Global hypokinesis  Stress ECG with no ischemic ST-T wave changes      Most recent cardiac catheterization as reviewed interpreted by me:  Results for orders placed during the hospital encounter of 08/10/22    Cardiac Catheterization/Vascular Study    Narrative   Paul White MD, PhD  [facility] cardiology  Date of service 8-    Procedure  1.  Left heart catheterization with coronary angiography of native and bypass grafts, LV gram    Indication  Recurrent anginal chest pain    After informed consent patient is brought to the Cath Lab sterilely prepped and draped in usual fashion expose the right groin for right common femoral arterial access via micropuncture modified Seldinger technique with placement of a 6 Palestinian sheath.  035 guidewire was advanced aortic valve followed by diagnostic JL 4 and JR4 catheters for selective left and right coronary angiography as well as bypass graft with SVG to OM and LIMA to LAD.  JR4 was used across aortic valve followed by hand-injection for LV gram, EDP assessment and pullback assessment of the transaortic valve gradient.  All catheters equipment removed.  Sheath flushed with heparinized saline to be removed by manual pressure.  He left Cath Lab chest pain-free hemodynamically electrically stable and talking to staff neurologically grossly tight bilaterally    Findings  1 opening aortic pressure was 180/97  2.  Closing pressure was 162/87  3.  LVEDP 10-12  4.  LV function low normal 50 to 55% globally  5.  Normal transaortic valve gradient    Angiography  1 left  main   Over 2 LAD proximally , perfused by widely patent LIMA to LAD with retrograde flow widely patent flow retrogradely to the diagonal branch across the anterolateral wall, there is no anastomotic disease, no downstream disease or even retrograde disease significantly with only mild diffuse luminal regularities, highly functional LIMA to LAD graft  3.  Circumflex ostial proximal , perfused by SVG to second obtuse marginal branch.  There is a patent stent in the proximal portion with ostial 50% narrowing in a very large caliber graft, there is copious contrast E flux surrounding the diagnostic catheter from the ostial portion of the graft into the aorta, there is diffuse luminal regularities throughout the graft, mild degeneration 20 to 30%, there is a patent stent to the distal portion of the graft just immediately prior to anastomosis there is otherwise widely patent.  Immediately after this stent terminates there is a short 60% to 70% narrowing at the anastomosis of the graft to the native coronary artery.,  There is retrograde flow from the anastomosis to the 2 obtuse marginal branches superior and inferior limbs as well as retrogradely to the circumflex as well as in the AV groove for continuation circumflex and L PLV branch.  This single graft supplies the entire lateral wall and inferolateral wall.,  There is no disease in the native circulation other than mild luminal irregularities  4.  RCA small, codominant, there is ostial 50% narrowing, diffuse atherosclerotic disease of 30 to 40% but no focal stenotic lesion or flow limitation, small vessel disease distally is seen but not amenable to intervention secondary to small caliber vessel size in the PLV and PDA    Patent SVG to obtuse marginal, patent stents in this as described, concern over anastomotic disease and narrowing given the large myocardial supply of the entire lateral wall however there is FILIPE-3 flow antegrade and  retrogradely,    Widely patent LIMA to LAD with no disease    Conclusions recommendations  1.  Anginal chest pain, uncontrolled blood pressure with stage II hypertension, today was 180 systolic on entering the Cath Lab, even while sleeping was above 165 systolic  2.  SVG to obtuse marginal branch has anastomotic narrowing but does not appear critical but does have a extremely large myocardial supply of the entire lateral wall    Intervention to the anastomosis of the SVG to obtuse marginal branch with jailed the retrograde limb back to the circumflex and to the inferolateral wall with L PLV segment in the AV groove which is sizable as well as likely the superior limb of the obtuse marginal, there is a high chance of diminishing graft functionality over time in this location as well as high chance of in-stent restenosis at this anastomotic location    We will discharge him today, advance his antianginals as well as his antihypertensives including his amlodipine, start Imdur.  We will bring him back in 2 to 4 weeks for Lexiscan stress to evaluate regional ischemia of the lateral wall if present will reevaluate best optimal strategy for lateral wall revascularization or control of his angina once blood pressure is also controlled    Patient is amenable for this conservative approach  He will be continued on aspirin and Plavix in addition to high intensity statin therapy, no beta-blocker secondary to heart rates in the 60s    Paul White MD, PhD    The following portions of the patient's history were reviewed and updated as appropriate: allergies, current medications, past family history, past medical history, past social history, past surgical history, and problem list.      ROS:  14 point review of systems negative except as mentioned above    Current Outpatient Medications:     amLODIPine (NORVASC) 5 MG tablet, Take 1 tablet by mouth Daily., Disp: 90 tablet, Rfl: 3    aspirin 81 MG EC tablet, Take 1 tablet by  mouth Daily., Disp: 90 tablet, Rfl: 3    clopidogrel (PLAVIX) 75 MG tablet, Take 1 tablet by mouth Daily., Disp: 90 tablet, Rfl: 3    gemfibrozil (LOPID) 600 MG tablet, Take 1 tablet by mouth once daily, Disp: 90 tablet, Rfl: 0    glipiZIDE (GLUCOTROL) 5 MG tablet, 0.5 tablets Daily., Disp: , Rfl:     isosorbide mononitrate (IMDUR) 60 MG 24 hr tablet, Take 1 tablet by mouth Daily., Disp: 90 tablet, Rfl: 3    losartan (COZAAR) 50 MG tablet, Take 1 tablet by mouth Daily., Disp: 90 tablet, Rfl: 3    metFORMIN ER (GLUCOPHAGE-XR) 500 MG 24 hr tablet, 2 tablets Daily With Breakfast., Disp: , Rfl:     nitroglycerin (NITROSTAT) 0.4 MG SL tablet, Place 1 tablet under the tongue Every 5 (Five) Minutes As Needed for Chest Pain. Prn chest pain, Disp: 100 tablet, Rfl: 3    Problem List:  Patient Active Problem List   Diagnosis    Bilateral carotid artery stenosis    Bradycardia    Coronary artery disease    Status post coronary artery bypass graft    Essential hypertension    Hyperlipidemia, mixed    Type 2 diabetes mellitus    Body fluid retention    Angina pectoris    Acute poliomyelitis    H/O: pneumonia    History of CEA (carotid endarterectomy)    S/P PTCA (percutaneous transluminal coronary angioplasty)     Past Medical History:  Past Medical History:   Diagnosis Date    Coronary artery disease     Diabetes mellitus     Hyperlipidemia     Hypertension      Past Surgical History:  Past Surgical History:   Procedure Laterality Date    CARDIAC CATHETERIZATION      2015    CARDIAC CATHETERIZATION N/A 08/10/2022    Procedure: Left Heart Cath;  Surgeon: Paul White MD;  Location: [facility] CATH INVASIVE LOCATION;  Service: Cardiology;  Laterality: N/A;    CAROTID ENDARTERECTOMY      CAROTID STENT  2015    PCI  to SVG supplying the LC/OM    CORONARY ARTERY BYPASS GRAFT      1998    CORONARY STENT PLACEMENT       Social History:  Social History     Socioeconomic History    Marital status:    Tobacco Use    Smoking  status: Former     Current packs/day: 0.00     Average packs/day: 1 pack/day for 25.0 years (25.0 ttl pk-yrs)     Types: Cigarettes     Start date: 1973     Quit date: 1998     Years since quittin.6    Smokeless tobacco: Never   Vaping Use    Vaping status: Never Used   Substance and Sexual Activity    Alcohol use: Never    Drug use: Never    Sexual activity: Yes     Partners: Female     Birth control/protection: Post-menopausal     Allergies:  Allergies   Allergen Reactions    Statins Cough     Severe muscle aches/spasms     Immunizations:  Immunization History   Administered Date(s) Administered    Pneumococcal Polysaccharide (PPSV23) 2017    TD Preservative Free (Tenivac) 2015            In-Office Procedure(s):  No orders to display        ASCVD RIsk Score::  The ASCVD Risk score (Timo SAL, et al., 2019) failed to calculate for the following reasons:    The patient has a prior MI or stroke diagnosis    Imaging:                 Lab Review:   Hospital Outpatient Visit on 2024   Component Date Value    Prox CCA PSV 2024 -54.5     Prox CCA EDV 2024 -7.8     Dist CCA PSV 2024 79.0     Dist CCA EDV 2024 13.2     Prox ICA PSV 2024 -114.0     Prox ICA EDV 2024 -23.6     Dist ICA PSV 2024 -93.8     Dist ICA EDV 2024 -24.2     Prox ECA PSV 2024 -96.0     Vertebral A PSV 2024 -49.1     Prox SCLA PSV 2024 87.8     ICA/CCA ratio 2024 -1.44     Prox CCA PSV 2024 45.2     Prox CCA EDV 2024 9.7     Dist CCA PSV 2024 -73.8     Dist CCA EDV 2024 -16.7     Prox ICA PSV 2024 -92.2     Prox ICA EDV 2024 -20.9     Dist ICA PSV 2024 -132.0     Dist ICA EDV 2024 -30.1     Prox ECA PSV 2024 109.0     Vertebral A PSV 2024 36.1     Prox SCLA PSV 2024 179.0     ICA/CCA ratio 2024 1.79     XLRA MARCOS RIGHT CAROTID * 2024 -113.0     XLRA MARCOS LEFT CAROTID B*  07/12/2024 -56.5    Hospital Outpatient Visit on 07/12/2024   Component Date Value    BH CV STRESS PROTOCOL 1 07/12/2024 [place]     Stage 1 07/12/2024 1.0     HR Stage 1 07/12/2024 133     BP Stage 1 07/12/2024 202/72     Duration Min Stage 1 07/12/2024 3     Duration Sec Stage 1 07/12/2024 0     Grade Stage 1 07/12/2024 10     Speed Stage 1 07/12/2024 1.7     BH CV STRESS METS STAGE 1 07/12/2024 5.0     Stage 2 07/12/2024 2.0     HR Stage 2 07/12/2024 145     BP Stage 2 07/12/2024 212/81     Duration Min Stage 2 07/12/2024 2     Duration Sec Stage 2 07/12/2024 32     Grade Stage 2 07/12/2024 12     Speed Stage 2 07/12/2024 2.5     BH CV STRESS METS STAGE 2 07/12/2024 7.5     Baseline HR 07/12/2024 55     Baseline BP 07/12/2024 178/72     Peak HR 07/12/2024 145     Peak BP 07/12/2024 212/81     Recovery HR 07/12/2024 79     Recovery BP 07/12/2024 175/89     Target HR (85%) 07/12/2024 124     Max. Pred. HR (100%) 07/12/2024 146     Percent Max Pred HR 07/12/2024 99.32     Percent Target HR 07/12/2024 117     BH CV REST NUCLEAR ISOTO* 07/12/2024 11.0     BH CV STRESS NUCLEAR ISO* 07/12/2024 33.0     Exercise duration (min) 07/12/2024 5     Exercise duration (sec) 07/12/2024 32     Estimated workload 07/12/2024 5.6     Nuc Stress EF 07/12/2024 36      Recent labs reviewed and interpreted for clinical significance and application            Level of Care:           Paul White MD  08/08/24  .

## 2024-08-22 RX ORDER — GEMFIBROZIL 600 MG/1
TABLET, FILM COATED ORAL
Qty: 90 TABLET | Refills: 0 | Status: SHIPPED | OUTPATIENT
Start: 2024-08-22

## 2024-09-20 ENCOUNTER — HOSPITAL ENCOUNTER (OUTPATIENT)
Dept: CARDIOLOGY | Facility: HOSPITAL | Age: 74
Discharge: HOME | End: 2024-09-20
Payer: MEDICARE

## 2024-09-20 VITALS
BODY MASS INDEX: 32.15 KG/M2 | HEIGHT: 65 IN | DIASTOLIC BLOOD PRESSURE: 60 MMHG | WEIGHT: 193 LBS | SYSTOLIC BLOOD PRESSURE: 151 MMHG

## 2024-09-20 DIAGNOSIS — I42.8 NON-ISCHEMIC CARDIOMYOPATHY: ICD-10-CM

## 2024-09-20 PROCEDURE — 93306 TTE W/DOPPLER COMPLETE: CPT

## 2024-09-22 LAB
BH CV ECHO MEAS - ACS: 1.38 CM
BH CV ECHO MEAS - AI P1/2T: 664 MSEC
BH CV ECHO MEAS - AO MAX PG: 15.2 MMHG
BH CV ECHO MEAS - AO MEAN PG: 7.7 MMHG
BH CV ECHO MEAS - AO ROOT DIAM: 2.33 CM
BH CV ECHO MEAS - AO V2 MAX: 194.7 CM/SEC
BH CV ECHO MEAS - AO V2 VTI: 47.3 CM
BH CV ECHO MEAS - AVA(I,D): 1.56 CM2
BH CV ECHO MEAS - EDV(CUBED): 240.8 ML
BH CV ECHO MEAS - EDV(MOD-SP4): 145.1 ML
BH CV ECHO MEAS - EF(MOD-BP): 40 %
BH CV ECHO MEAS - EF(MOD-SP4): 35 %
BH CV ECHO MEAS - ESV(CUBED): 123.7 ML
BH CV ECHO MEAS - ESV(MOD-SP4): 95.6 ML
BH CV ECHO MEAS - FS: 19.9 %
BH CV ECHO MEAS - IVS/LVPW: 1.01 CM
BH CV ECHO MEAS - IVSD: 1.19 CM
BH CV ECHO MEAS - LA DIMENSION: 5.1 CM
BH CV ECHO MEAS - LV DIASTOLIC VOL/BSA (35-75): 74.5 CM2
BH CV ECHO MEAS - LV MASS(C)D: 329.6 GRAMS
BH CV ECHO MEAS - LV MAX PG: 3.4 MMHG
BH CV ECHO MEAS - LV MEAN PG: 1.71 MMHG
BH CV ECHO MEAS - LV SYSTOLIC VOL/BSA (12-30): 49.1 CM2
BH CV ECHO MEAS - LV V1 MAX: 92.8 CM/SEC
BH CV ECHO MEAS - LV V1 VTI: 24.1 CM
BH CV ECHO MEAS - LVIDD: 6.2 CM
BH CV ECHO MEAS - LVIDS: 5 CM
BH CV ECHO MEAS - LVOT AREA: 3.1 CM2
BH CV ECHO MEAS - LVOT DIAM: 1.98 CM
BH CV ECHO MEAS - LVPWD: 1.19 CM
BH CV ECHO MEAS - MR MAX PG: 114.6 MMHG
BH CV ECHO MEAS - MR MAX VEL: 535.1 CM/SEC
BH CV ECHO MEAS - MV A MAX VEL: 69.2 CM/SEC
BH CV ECHO MEAS - MV DEC SLOPE: 293.8 CM/SEC2
BH CV ECHO MEAS - MV DEC TIME: 0.29 SEC
BH CV ECHO MEAS - MV E MAX VEL: 84.2 CM/SEC
BH CV ECHO MEAS - MV E/A: 1.22
BH CV ECHO MEAS - MV MAX PG: 3.9 MMHG
BH CV ECHO MEAS - MV MEAN PG: 1.15 MMHG
BH CV ECHO MEAS - MV V2 VTI: 39.8 CM
BH CV ECHO MEAS - MVA(VTI): 1.85 CM2
BH CV ECHO MEAS - PA ACC TIME: 0.11 SEC
BH CV ECHO MEAS - PA V2 MAX: 94.6 CM/SEC
BH CV ECHO MEAS - PI END-D VEL: 82.3 CM/SEC
BH CV ECHO MEAS - PULM A REVS DUR: 0.15 SEC
BH CV ECHO MEAS - PULM A REVS VEL: 18.6 CM/SEC
BH CV ECHO MEAS - PULM DIAS VEL: 74.8 CM/SEC
BH CV ECHO MEAS - PULM S/D: 0.75
BH CV ECHO MEAS - PULM SYS VEL: 56.4 CM/SEC
BH CV ECHO MEAS - RAP SYSTOLE: 3 MMHG
BH CV ECHO MEAS - RVSP: 44 MMHG
BH CV ECHO MEAS - SV(LVOT): 73.7 ML
BH CV ECHO MEAS - SV(MOD-SP4): 49.4 ML
BH CV ECHO MEAS - SVI(LVOT): 37.8 ML/M2
BH CV ECHO MEAS - SVI(MOD-SP4): 25.4 ML/M2
BH CV ECHO MEAS - TAPSE (>1.6): 1.9 CM
BH CV ECHO MEAS - TR MAX PG: 41 MMHG
BH CV ECHO MEAS - TR MAX VEL: 309.1 CM/SEC
BH CV XLRA - RV BASE: 4 CM
BH CV XLRA - RV MID: 1.9 CM
LV EF 2D ECHO EST: 40 %

## 2024-11-19 RX ORDER — GEMFIBROZIL 600 MG/1
TABLET, FILM COATED ORAL
Qty: 90 TABLET | Refills: 0 | Status: SHIPPED | OUTPATIENT
Start: 2024-11-19

## 2025-02-13 ENCOUNTER — APPOINTMENT (OUTPATIENT)
Dept: ULTRASOUND IMAGING | Facility: HOSPITAL | Age: 75
DRG: 065 | End: 2025-02-13
Payer: MEDICARE

## 2025-02-13 ENCOUNTER — APPOINTMENT (OUTPATIENT)
Dept: CT IMAGING | Facility: HOSPITAL | Age: 75
DRG: 065 | End: 2025-02-13
Payer: MEDICARE

## 2025-02-13 ENCOUNTER — APPOINTMENT (OUTPATIENT)
Dept: GENERAL RADIOLOGY | Facility: HOSPITAL | Age: 75
DRG: 065 | End: 2025-02-13
Payer: MEDICARE

## 2025-02-13 ENCOUNTER — HOSPITAL ENCOUNTER (INPATIENT)
Facility: HOSPITAL | Age: 75
LOS: 3 days | Discharge: HOME | DRG: 065 | End: 2025-02-16
Attending: EMERGENCY MEDICINE | Admitting: STUDENT IN AN ORGANIZED HEALTH CARE EDUCATION/TRAINING PROGRAM
Payer: MEDICARE

## 2025-02-13 DIAGNOSIS — N17.9 AKI (ACUTE KIDNEY INJURY): ICD-10-CM

## 2025-02-13 DIAGNOSIS — J10.1 INFLUENZA A: ICD-10-CM

## 2025-02-13 DIAGNOSIS — R41.82 ALTERED MENTAL STATUS, UNSPECIFIED ALTERED MENTAL STATUS TYPE: Primary | ICD-10-CM

## 2025-02-13 DIAGNOSIS — I63.9 STROKE (CEREBRUM): ICD-10-CM

## 2025-02-13 LAB
ALBUMIN SERPL-MCNC: 4 G/DL (ref 3.5–5.2)
ALBUMIN/GLOB SERPL: 1 G/DL
ALP SERPL-CCNC: 51 U/L (ref 39–117)
ALT SERPL W P-5'-P-CCNC: 25 U/L (ref 1–41)
AMMONIA BLD-SCNC: 24 UMOL/L (ref 16–60)
ANION GAP SERPL CALCULATED.3IONS-SCNC: 16.8 MMOL/L (ref 5–15)
AST SERPL-CCNC: 40 U/L (ref 1–40)
B PARAPERT DNA SPEC QL NAA+PROBE: NOT DETECTED
B PERT DNA SPEC QL NAA+PROBE: NOT DETECTED
BACTERIA UR QL AUTO: ABNORMAL /HPF
BASOPHILS # BLD AUTO: 0.03 10*3/MM3 (ref 0–0.2)
BASOPHILS NFR BLD AUTO: 0.3 % (ref 0–1.5)
BILIRUB SERPL-MCNC: 0.3 MG/DL (ref 0–1.2)
BILIRUB UR QL STRIP: NEGATIVE
BUN SERPL-MCNC: 38 MG/DL (ref 8–23)
BUN/CREAT SERPL: 17 (ref 7–25)
C PNEUM DNA NPH QL NAA+NON-PROBE: NOT DETECTED
CALCIUM SPEC-SCNC: 9.2 MG/DL (ref 8.6–10.5)
CHLORIDE SERPL-SCNC: 99 MMOL/L (ref 98–107)
CLARITY UR: ABNORMAL
CO2 SERPL-SCNC: 22.2 MMOL/L (ref 22–29)
COLOR UR: ABNORMAL
CREAT SERPL-MCNC: 2.23 MG/DL (ref 0.76–1.27)
DEPRECATED RDW RBC AUTO: 45.2 FL (ref 37–54)
EGFRCR SERPLBLD CKD-EPI 2021: 30 ML/MIN/1.73
EOSINOPHIL # BLD AUTO: 0.02 10*3/MM3 (ref 0–0.4)
EOSINOPHIL NFR BLD AUTO: 0.2 % (ref 0.3–6.2)
ERYTHROCYTE [DISTWIDTH] IN BLOOD BY AUTOMATED COUNT: 13.3 % (ref 12.3–15.4)
FLUAV H1 2009 PAND RNA NPH QL NAA+PROBE: DETECTED
FLUBV RNA ISLT QL NAA+PROBE: NOT DETECTED
GLOBULIN UR ELPH-MCNC: 3.9 GM/DL
GLUCOSE BLDC GLUCOMTR-MCNC: 202 MG/DL (ref 70–105)
GLUCOSE BLDC GLUCOMTR-MCNC: 203 MG/DL (ref 70–105)
GLUCOSE BLDC GLUCOMTR-MCNC: 224 MG/DL (ref 70–105)
GLUCOSE BLDC GLUCOMTR-MCNC: 248 MG/DL (ref 70–105)
GLUCOSE SERPL-MCNC: 284 MG/DL (ref 65–99)
GLUCOSE UR STRIP-MCNC: NEGATIVE MG/DL
HADV DNA SPEC NAA+PROBE: NOT DETECTED
HCOV 229E RNA SPEC QL NAA+PROBE: NOT DETECTED
HCOV HKU1 RNA SPEC QL NAA+PROBE: NOT DETECTED
HCOV NL63 RNA SPEC QL NAA+PROBE: NOT DETECTED
HCOV OC43 RNA SPEC QL NAA+PROBE: NOT DETECTED
HCT VFR BLD AUTO: 43.9 % (ref 37.5–51)
HGB BLD-MCNC: 13.7 G/DL (ref 13–17.7)
HGB UR QL STRIP.AUTO: NEGATIVE
HMPV RNA NPH QL NAA+NON-PROBE: NOT DETECTED
HOLD SPECIMEN: NORMAL
HPIV1 RNA ISLT QL NAA+PROBE: NOT DETECTED
HPIV2 RNA SPEC QL NAA+PROBE: NOT DETECTED
HPIV3 RNA NPH QL NAA+PROBE: NOT DETECTED
HPIV4 P GENE NPH QL NAA+PROBE: NOT DETECTED
HYALINE CASTS UR QL AUTO: ABNORMAL /LPF
IMM GRANULOCYTES # BLD AUTO: 0.03 10*3/MM3 (ref 0–0.05)
IMM GRANULOCYTES NFR BLD AUTO: 0.3 % (ref 0–0.5)
KETONES UR QL STRIP: ABNORMAL
LEUKOCYTE ESTERASE UR QL STRIP.AUTO: NEGATIVE
LYMPHOCYTES # BLD AUTO: 1.44 10*3/MM3 (ref 0.7–3.1)
LYMPHOCYTES NFR BLD AUTO: 16.7 % (ref 19.6–45.3)
M PNEUMO IGG SER IA-ACNC: NOT DETECTED
MCH RBC QN AUTO: 28.8 PG (ref 26.6–33)
MCHC RBC AUTO-ENTMCNC: 31.2 G/DL (ref 31.5–35.7)
MCV RBC AUTO: 92.2 FL (ref 79–97)
MONOCYTES # BLD AUTO: 0.76 10*3/MM3 (ref 0.1–0.9)
MONOCYTES NFR BLD AUTO: 8.8 % (ref 5–12)
NEUTROPHILS NFR BLD AUTO: 6.32 10*3/MM3 (ref 1.7–7)
NEUTROPHILS NFR BLD AUTO: 73.7 % (ref 42.7–76)
NITRITE UR QL STRIP: NEGATIVE
NRBC BLD AUTO-RTO: 0 /100 WBC (ref 0–0.2)
PH UR STRIP.AUTO: <=5 [PH] (ref 5–8)
PLATELET # BLD AUTO: 258 10*3/MM3 (ref 140–450)
PMV BLD AUTO: 10.9 FL (ref 6–12)
POTASSIUM SERPL-SCNC: 4.4 MMOL/L (ref 3.5–5.2)
PROCALCITONIN SERPL-MCNC: 0.29 NG/ML (ref 0–0.25)
PROT SERPL-MCNC: 7.9 G/DL (ref 6–8.5)
PROT UR QL STRIP: ABNORMAL
RBC # BLD AUTO: 4.76 10*6/MM3 (ref 4.14–5.8)
RBC # UR STRIP: ABNORMAL /HPF
REF LAB TEST METHOD: ABNORMAL
RHINOVIRUS RNA SPEC NAA+PROBE: NOT DETECTED
RSV RNA NPH QL NAA+NON-PROBE: NOT DETECTED
SARS-COV-2 RNA RESP QL NAA+PROBE: NOT DETECTED
SODIUM SERPL-SCNC: 138 MMOL/L (ref 136–145)
SP GR UR STRIP: 1.03 (ref 1–1.03)
SQUAMOUS #/AREA URNS HPF: ABNORMAL /HPF
UROBILINOGEN UR QL STRIP: ABNORMAL
WBC # UR STRIP: ABNORMAL /HPF
WBC NRBC COR # BLD AUTO: 8.6 10*3/MM3 (ref 3.4–10.8)
WHOLE BLOOD HOLD COAG: NORMAL

## 2025-02-13 PROCEDURE — 84439 ASSAY OF FREE THYROXINE: CPT | Performed by: STUDENT IN AN ORGANIZED HEALTH CARE EDUCATION/TRAINING PROGRAM

## 2025-02-13 PROCEDURE — 82948 REAGENT STRIP/BLOOD GLUCOSE: CPT

## 2025-02-13 PROCEDURE — 63710000001 INSULIN LISPRO (HUMAN) PER 5 UNITS

## 2025-02-13 PROCEDURE — 25810000003 SODIUM CHLORIDE 0.9 % SOLUTION

## 2025-02-13 PROCEDURE — 80053 COMPREHEN METABOLIC PANEL: CPT | Performed by: EMERGENCY MEDICINE

## 2025-02-13 PROCEDURE — 70450 CT HEAD/BRAIN W/O DYE: CPT

## 2025-02-13 PROCEDURE — 81001 URINALYSIS AUTO W/SCOPE: CPT | Performed by: EMERGENCY MEDICINE

## 2025-02-13 PROCEDURE — 99285 EMERGENCY DEPT VISIT HI MDM: CPT

## 2025-02-13 PROCEDURE — 84443 ASSAY THYROID STIM HORMONE: CPT | Performed by: STUDENT IN AN ORGANIZED HEALTH CARE EDUCATION/TRAINING PROGRAM

## 2025-02-13 PROCEDURE — G0378 HOSPITAL OBSERVATION PER HR: HCPCS

## 2025-02-13 PROCEDURE — 84145 PROCALCITONIN (PCT): CPT | Performed by: EMERGENCY MEDICINE

## 2025-02-13 PROCEDURE — 82140 ASSAY OF AMMONIA: CPT | Performed by: EMERGENCY MEDICINE

## 2025-02-13 PROCEDURE — 85025 COMPLETE CBC W/AUTO DIFF WBC: CPT | Performed by: EMERGENCY MEDICINE

## 2025-02-13 PROCEDURE — 71045 X-RAY EXAM CHEST 1 VIEW: CPT

## 2025-02-13 PROCEDURE — 82607 VITAMIN B-12: CPT | Performed by: STUDENT IN AN ORGANIZED HEALTH CARE EDUCATION/TRAINING PROGRAM

## 2025-02-13 PROCEDURE — 72040 X-RAY EXAM NECK SPINE 2-3 VW: CPT

## 2025-02-13 PROCEDURE — 25810000003 SODIUM CHLORIDE 0.9 % SOLUTION: Performed by: EMERGENCY MEDICINE

## 2025-02-13 PROCEDURE — 93005 ELECTROCARDIOGRAM TRACING: CPT | Performed by: EMERGENCY MEDICINE

## 2025-02-13 PROCEDURE — 76775 US EXAM ABDO BACK WALL LIM: CPT

## 2025-02-13 PROCEDURE — 0202U NFCT DS 22 TRGT SARS-COV-2: CPT | Performed by: EMERGENCY MEDICINE

## 2025-02-13 PROCEDURE — 80061 LIPID PANEL: CPT | Performed by: NURSE PRACTITIONER

## 2025-02-13 PROCEDURE — 82746 ASSAY OF FOLIC ACID SERUM: CPT | Performed by: STUDENT IN AN ORGANIZED HEALTH CARE EDUCATION/TRAINING PROGRAM

## 2025-02-13 RX ORDER — ACETAMINOPHEN 325 MG/1
650 TABLET ORAL EVERY 12 HOURS PRN
COMMUNITY

## 2025-02-13 RX ORDER — ONDANSETRON 2 MG/ML
4 INJECTION INTRAMUSCULAR; INTRAVENOUS EVERY 6 HOURS PRN
Status: DISCONTINUED | OUTPATIENT
Start: 2025-02-13 | End: 2025-02-16 | Stop reason: HOSPADM

## 2025-02-13 RX ORDER — AMLODIPINE BESYLATE 5 MG/1
5 TABLET ORAL DAILY
Status: DISCONTINUED | OUTPATIENT
Start: 2025-02-13 | End: 2025-02-16 | Stop reason: HOSPADM

## 2025-02-13 RX ORDER — ISOSORBIDE MONONITRATE 60 MG/1
60 TABLET, EXTENDED RELEASE ORAL DAILY
COMMUNITY
End: 2025-02-24

## 2025-02-13 RX ORDER — ASPIRIN 81 MG/1
81 TABLET ORAL DAILY
Status: DISCONTINUED | OUTPATIENT
Start: 2025-02-13 | End: 2025-02-16 | Stop reason: HOSPADM

## 2025-02-13 RX ORDER — ISOSORBIDE MONONITRATE 60 MG/1
60 TABLET, EXTENDED RELEASE ORAL DAILY
Status: DISCONTINUED | OUTPATIENT
Start: 2025-02-14 | End: 2025-02-16 | Stop reason: HOSPADM

## 2025-02-13 RX ORDER — BISACODYL 5 MG/1
5 TABLET, DELAYED RELEASE ORAL DAILY PRN
Status: DISCONTINUED | OUTPATIENT
Start: 2025-02-13 | End: 2025-02-16 | Stop reason: HOSPADM

## 2025-02-13 RX ORDER — ASPIRIN 81 MG/1
81 TABLET ORAL DAILY
Status: ON HOLD | COMMUNITY
End: 2025-02-16

## 2025-02-13 RX ORDER — GLIPIZIDE 5 MG/1
2.5 TABLET ORAL DAILY
COMMUNITY

## 2025-02-13 RX ORDER — GEMFIBROZIL 600 MG/1
600 TABLET, FILM COATED ORAL DAILY
COMMUNITY
End: 2025-02-16 | Stop reason: HOSPADM

## 2025-02-13 RX ORDER — CLOPIDOGREL BISULFATE 75 MG/1
75 TABLET ORAL DAILY
Status: ON HOLD | COMMUNITY
End: 2025-02-16

## 2025-02-13 RX ORDER — METFORMIN HYDROCHLORIDE 500 MG/1
500 TABLET, EXTENDED RELEASE ORAL 2 TIMES DAILY
COMMUNITY
End: 2025-02-24

## 2025-02-13 RX ORDER — IBUPROFEN 600 MG/1
1 TABLET ORAL
Status: DISCONTINUED | OUTPATIENT
Start: 2025-02-13 | End: 2025-02-16 | Stop reason: HOSPADM

## 2025-02-13 RX ORDER — BISACODYL 10 MG
10 SUPPOSITORY, RECTAL RECTAL DAILY PRN
Status: DISCONTINUED | OUTPATIENT
Start: 2025-02-13 | End: 2025-02-16 | Stop reason: HOSPADM

## 2025-02-13 RX ORDER — ACETAMINOPHEN 160 MG/5ML
650 SOLUTION ORAL EVERY 4 HOURS PRN
Status: DISCONTINUED | OUTPATIENT
Start: 2025-02-13 | End: 2025-02-16 | Stop reason: HOSPADM

## 2025-02-13 RX ORDER — INSULIN LISPRO 100 [IU]/ML
2-9 INJECTION, SOLUTION INTRAVENOUS; SUBCUTANEOUS
Status: DISCONTINUED | OUTPATIENT
Start: 2025-02-13 | End: 2025-02-16 | Stop reason: HOSPADM

## 2025-02-13 RX ORDER — CLOPIDOGREL BISULFATE 75 MG/1
75 TABLET ORAL DAILY
Status: DISCONTINUED | OUTPATIENT
Start: 2025-02-13 | End: 2025-02-16 | Stop reason: HOSPADM

## 2025-02-13 RX ORDER — LOSARTAN POTASSIUM 50 MG/1
50 TABLET ORAL DAILY
COMMUNITY
End: 2025-02-16 | Stop reason: HOSPADM

## 2025-02-13 RX ORDER — POLYETHYLENE GLYCOL 3350 17 G/17G
17 POWDER, FOR SOLUTION ORAL DAILY PRN
Status: DISCONTINUED | OUTPATIENT
Start: 2025-02-13 | End: 2025-02-16 | Stop reason: HOSPADM

## 2025-02-13 RX ORDER — LOSARTAN POTASSIUM 50 MG/1
50 TABLET ORAL DAILY
Status: DISCONTINUED | OUTPATIENT
Start: 2025-02-13 | End: 2025-02-16 | Stop reason: HOSPADM

## 2025-02-13 RX ORDER — ONDANSETRON 4 MG/1
4 TABLET, ORALLY DISINTEGRATING ORAL EVERY 6 HOURS PRN
Status: DISCONTINUED | OUTPATIENT
Start: 2025-02-13 | End: 2025-02-16 | Stop reason: HOSPADM

## 2025-02-13 RX ORDER — DEXTROSE MONOHYDRATE 25 G/50ML
25 INJECTION, SOLUTION INTRAVENOUS
Status: DISCONTINUED | OUTPATIENT
Start: 2025-02-13 | End: 2025-02-16 | Stop reason: HOSPADM

## 2025-02-13 RX ORDER — AMLODIPINE BESYLATE 5 MG/1
5 TABLET ORAL DAILY
COMMUNITY
End: 2025-02-24

## 2025-02-13 RX ORDER — AMOXICILLIN 250 MG
2 CAPSULE ORAL 2 TIMES DAILY PRN
Status: DISCONTINUED | OUTPATIENT
Start: 2025-02-13 | End: 2025-02-16 | Stop reason: HOSPADM

## 2025-02-13 RX ORDER — NICOTINE POLACRILEX 4 MG
15 LOZENGE BUCCAL
Status: DISCONTINUED | OUTPATIENT
Start: 2025-02-13 | End: 2025-02-16 | Stop reason: HOSPADM

## 2025-02-13 RX ORDER — SODIUM CHLORIDE 9 MG/ML
125 INJECTION, SOLUTION INTRAVENOUS CONTINUOUS
Status: DISPENSED | OUTPATIENT
Start: 2025-02-13 | End: 2025-02-14

## 2025-02-13 RX ORDER — ACETAMINOPHEN 650 MG/1
650 SUPPOSITORY RECTAL EVERY 4 HOURS PRN
Status: DISCONTINUED | OUTPATIENT
Start: 2025-02-13 | End: 2025-02-16 | Stop reason: HOSPADM

## 2025-02-13 RX ORDER — ACETAMINOPHEN 325 MG/1
650 TABLET ORAL EVERY 4 HOURS PRN
Status: DISCONTINUED | OUTPATIENT
Start: 2025-02-13 | End: 2025-02-16 | Stop reason: HOSPADM

## 2025-02-13 RX ADMIN — INSULIN LISPRO 4 UNITS: 100 INJECTION, SOLUTION INTRAVENOUS; SUBCUTANEOUS at 21:48

## 2025-02-13 RX ADMIN — ASPIRIN 81 MG: 81 TABLET, COATED ORAL at 18:17

## 2025-02-13 RX ADMIN — INSULIN LISPRO 4 UNITS: 100 INJECTION, SOLUTION INTRAVENOUS; SUBCUTANEOUS at 17:46

## 2025-02-13 RX ADMIN — CLOPIDOGREL BISULFATE 75 MG: 75 TABLET ORAL at 18:17

## 2025-02-13 RX ADMIN — SODIUM CHLORIDE 125 ML/HR: 9 INJECTION, SOLUTION INTRAVENOUS at 12:48

## 2025-02-13 RX ADMIN — SODIUM CHLORIDE 1000 ML: 9 INJECTION, SOLUTION INTRAVENOUS at 06:49

## 2025-02-13 RX ADMIN — INSULIN LISPRO 4 UNITS: 100 INJECTION, SOLUTION INTRAVENOUS; SUBCUTANEOUS at 12:49

## 2025-02-13 RX ADMIN — AMLODIPINE BESYLATE 5 MG: 5 TABLET ORAL at 18:17

## 2025-02-13 NOTE — Clinical Note
Level of Care: Med/Surg [1]   Diagnosis: Influenza A [[id number]]   Admitting Physician: ERNESTO MÉNDEZ [[id number]]   Attending Physician: ERNESTO MÉNDEZ [[id number]]   Certification: I Certify That Inpatient Hospital Services Are Medically Necessary For Greater Than 2 Midnights

## 2025-02-13 NOTE — ED NOTES
Nursing report ED to floor  Luiz Connors  74 y.o.  male    HPI:   Chief Complaint   Patient presents with    Speech Problem       Admitting doctor:   Onesimo Orlando MD    Admitting diagnosis:   The primary encounter diagnosis was Altered mental status, unspecified altered mental status type. Diagnoses of Influenza A and RAÚL (acute kidney injury) were also pertinent to this visit.    Code status:   Current Code Status       Date Active Code Status Order ID Comments User Context       2/13/2025 0912 CPR (Attempt to Resuscitate) [id number]  Roderick Reyna PA-C ED        Question Answer    Code Status (Patient has no pulse and is not breathing) CPR (Attempt to Resuscitate)    Medical Interventions (Patient has pulse or is breathing) Full Support                    Allergies:   Patient has no known allergies.    Isolation:  Droplet     Fall Risk:  Fall Risk Assessment was completed, and patient is at low risk for falls.   Predictive Model Details         15 (Low) Factor Value    Calculated 2/13/2025 16:07 Age 74    Risk of Fall Model Active Peripheral IV Present     Imaging order in this encounter Present     Diastolic BP 45     Respiratory Rate 18     Magnesium not on file     Drug Use Not Asked     Creatinine 2.23 mg/dL     Jose Scale not on file     Number of Distinct Medication Classes administered 2     Chloride 99 mmol/L     Clinically Relevant Sex Not Female     Albumin 4 g/dL     Calcium 9.2 mg/dL     Days after Admission 0.406     ALT 25 U/L     Potassium 4.4 mmol/L     Tobacco Use Not Asked     Total Bilirubin 0.3 mg/dL         Weight:       02/13/25  0906   Weight: 81.6 kg (180 lb)       Intake and Output    Intake/Output Summary (Last 24 hours) at 2/13/2025 1607  Last data filed at 2/13/2025 0928  Gross per 24 hour   Intake 1000 ml   Output --   Net 1000 ml       Diet:   Dietary Orders (From admission, onward)       Start     Ordered    02/13/25 0912  Diet: Cardiac; Healthy Heart (2-3 Na+); Fluid  Consistency: Thin (IDDSI 0)  Diet Effective Now        References:    Diet Order Definitions   Question Answer Comment   Diets: Cardiac    Cardiac Diet: Healthy Heart (2-3 Na+)    Fluid Consistency: Thin (IDDSI 0)        02/13/25 0912                     Most recent vitals:   Vitals:    02/13/25 1515 02/13/25 1530 02/13/25 1545 02/13/25 1600   BP: 126/47 108/71 121/45 132/45   BP Location:       Patient Position:       Pulse: 62 57 64 70   Resp:   18    Temp:       SpO2: 92% 93% 93% 93%   Weight:       Height:           Active LDAs/IV Access:   Lines, Drains & Airways       Active LDAs       Name Placement date Placement time Site Days    Peripheral IV 02/13/25 0648 Right Antecubital 02/13/25  0648  Antecubital  less than 1    Peripheral IV 02/13/25 Left Antecubital 02/13/25  --  Antecubital  less than 1                    Skin Condition:   Skin Assessments (last day)       Date/Time Interval    02/13/25 08:32:54 baseline             Labs (abnormal labs have a star):   Labs Reviewed   RESPIRATORY PANEL PCR W/ COVID-19 (SARS-COV-2), NP SWAB IN UTM/VTP, 2 HR TAT - Abnormal; Notable for the following components:       Result Value    Influenza A H1 2009 PCR Detected (*)     All other components within normal limits    Narrative:     In the setting of a positive respiratory panel with a viral infection PLUS a negative procalcitonin without other underlying concern for bacterial infection, consider observing off antibiotics or discontinuation of antibiotics and continue supportive care. If the respiratory panel is positive for atypical bacterial infection (Bordetella pertussis, Chlamydophila pneumoniae, or Mycoplasma pneumoniae), consider antibiotic de-escalation to target atypical bacterial infection.   PROCALCITONIN - Abnormal; Notable for the following components:    Procalcitonin 0.29 (*)     All other components within normal limits    Narrative:     As a Marker for Sepsis (Non-Neonates):    1. <0.5 ng/mL represents  "a low risk of severe sepsis and/or septic shock.  2. >2 ng/mL represents a high risk of severe sepsis and/or septic shock.    As a Marker for Lower Respiratory Tract Infections that require antibiotic therapy:    PCT on Admission    Antibiotic Therapy       6-12 Hrs later    >0.5                Strongly Recommended  >0.25 - <0.5        Recommended   0.1 - 0.25          Discouraged              Remeasure/reassess PCT  <0.1                Strongly Discouraged     Remeasure/reassess PCT    As 28 day mortality risk marker: \"Change in Procalcitonin Result\" (>80% or <=80%) if Day 0 (or Day 1) and Day 4 values are available. Refer to http://[email or web address]pct-calculator.com    Change in PCT <=80%  A decrease of PCT levels below or equal to 80% defines a positive change in PCT test result representing a higher risk for 28-day all-cause mortality of patients diagnosed with severe sepsis for septic shock.    Change in PCT >80%  A decrease of PCT levels of more than 80% defines a negative change in PCT result representing a lower risk for 28-day all-cause mortality of patients diagnosed with severe sepsis or septic shock.      URINALYSIS W/ CULTURE IF INDICATED - Abnormal; Notable for the following components:    Color, UA Dark Yellow (*)     Appearance, UA Slightly Cloudy (*)     Ketones, UA Trace (*)     Protein,  mg/dL (2+) (*)     All other components within normal limits    Narrative:     In absence of clinical symptoms, the presence of pyuria, bacteria, and/or nitrites on the urinalysis result does not correlate with infection.   COMPREHENSIVE METABOLIC PANEL - Abnormal; Notable for the following components:    Glucose 284 (*)     BUN 38 (*)     Creatinine 2.23 (*)     Anion Gap 16.8 (*)     eGFR 30.0 (*)     All other components within normal limits    Narrative:     GFR Categories in Chronic Kidney Disease (CKD)      GFR Category          GFR (mL/min/1.73)    Interpretation  G1                     90 or greater       "   Normal or high (1)  G2                      60-89                Mild decrease (1)  G3a                   45-59                Mild to moderate decrease  G3b                   30-44                Moderate to severe decrease  G4                    15-29                Severe decrease  G5                    14 or less           Kidney failure          (1)In the absence of evidence of kidney disease, neither GFR category G1 or G2 fulfill the criteria for CKD.    eGFR calculation 2021 CKD-EPI creatinine equation, which does not include race as a factor   CBC WITH AUTO DIFFERENTIAL - Abnormal; Notable for the following components:    MCHC 31.2 (*)     Lymphocyte % 16.7 (*)     Eosinophil % 0.2 (*)     All other components within normal limits   URINALYSIS, MICROSCOPIC ONLY - Abnormal; Notable for the following components:    Bacteria, UA 2+ (*)     Squamous Epithelial Cells, UA 3-6 (*)     All other components within normal limits   POCT GLUCOSE FINGERSTICK - Abnormal; Notable for the following components:    Glucose 248 (*)     All other components within normal limits   POCT GLUCOSE FINGERSTICK - Abnormal; Notable for the following components:    Glucose 203 (*)     All other components within normal limits   AMMONIA - Normal   POCT GLUCOSE FINGERSTICK   POCT GLUCOSE FINGERSTICK   POCT GLUCOSE FINGERSTICK   POCT GLUCOSE FINGERSTICK   CBC AND DIFFERENTIAL    Narrative:     The following orders were created for panel order CBC & Differential.  Procedure                               Abnormality         Status                     ---------                               -----------         ------                     CBC Auto Differential[[id number]]        Abnormal            Final result                 Please view results for these tests on the individual orders.   EXTRA TUBES    Narrative:     The following orders were created for panel order Extra Tubes.  Procedure                               Abnormality          Status                     ---------                               -----------         ------                     Gold Top - [facility][[id number]]                                   Final result               Light Blue Top[[id number]]                                   Final result                 Please view results for these tests on the individual orders.   [facility]   LIGHT BLUE TOP       LOC: Person, Place, Time, and Situation    Telemetry:  Med/Surg    Cardiac Monitoring Ordered: yes    EKG:   ECG 12 Lead Altered Mental Status   Preliminary Result   HEART RATE=59  bpm   RR Drlyiygs=4617  ms   TN Jdqyylzr=323  ms   P Horizontal Axis=38  deg   P Front Axis=54  deg   QRSD Pcstzpvm=935  ms   QT Mncnagsn=875  ms   NRqV=815  ms   QRS Axis=-32  deg   T Wave Axis=91  deg   - ABNORMAL ECG -   Sinus bradycardia   Nonspecific intraventricular conduction delay   No previous ECG available for comparison   Date and Time of Study:2025-02-13 06:39:40          Medications Given in the ED:   Medications   Potassium Replacement - Follow Nurse / BPA Driven Protocol (has no administration in time range)   Magnesium Standard Dose Replacement - Follow Nurse / BPA Driven Protocol (has no administration in time range)   Phosphorus Replacement - Follow Nurse / BPA Driven Protocol (has no administration in time range)   Calcium Replacement - Follow Nurse / BPA Driven Protocol (has no administration in time range)   sennosides-docusate (PERICOLACE) 8.6-50 MG per tablet 2 tablet (has no administration in time range)     And   polyethylene glycol (MIRALAX) packet 17 g (has no administration in time range)     And   bisacodyl (DULCOLAX) EC tablet 5 mg (has no administration in time range)     And   bisacodyl (DULCOLAX) suppository 10 mg (has no administration in time range)   melatonin tablet 5 mg (has no administration in time range)   ondansetron ODT (ZOFRAN-ODT) disintegrating tablet 4 mg (has no administration in time range)     Or    ondansetron (ZOFRAN) injection 4 mg (has no administration in time range)   acetaminophen (TYLENOL) tablet 650 mg (has no administration in time range)     Or   acetaminophen (TYLENOL) 160 MG/5ML oral solution 650 mg (has no administration in time range)     Or   acetaminophen (TYLENOL) suppository 650 mg (has no administration in time range)   dextrose (GLUTOSE) oral gel 15 g (has no administration in time range)   dextrose (D50W) (25 g/50 mL) IV injection 25 g (has no administration in time range)   glucagon (GLUCAGEN) injection 1 mg (has no administration in time range)   insulin lispro (HUMALOG/ADMELOG) injection 2-9 Units (4 Units Subcutaneous Given 2/13/25 1249)   sodium chloride 0.9 % infusion (125 mL/hr Intravenous Rate/Dose Verify 2/13/25 1606)   sodium chloride 0.9 % bolus 1,000 mL (0 mL Intravenous Stopped 2/13/25 0961)       Imaging results:  XR Spine Cervical 2 or 3 View    Result Date: 2/13/2025  Impression: 1.Changes from C4 corpectomy with ACDF at C3-C5. One or both of the C3 screws are fractured. 2.Multilevel degenerative changes as described above. Electronically Signed: Demario Bettencourt MD  2/13/2025 8:51 AM EST  Workstation ID: [id number]    CT Head Without Contrast    Result Date: 2/13/2025  Impression: 1. No acute intracranial abnormality. 2. Remote right posterior temporal infarct with encephalomalacia. 3. Cervical ACDF hardware partially imaged on  radiograph. C3 screw appears fractured, cervical radiographs may be helpful to better assess. Electronically Signed: Demetrio Hall MD  2/13/2025 7:31 AM EST  Workstation ID: [id number]    XR Chest 1 View    Result Date: 2/13/2025  Impression: No acute cardiopulmonary process. Electronically Signed: Beth Munoz MD  2/13/2025 6:52 AM EST  Workstation ID: [id number]     Social issues:   Social History     Socioeconomic History    Marital status:        NIH Stroke Scale:  Interval: baseline (02/13/25 0832)  1a. Level of Consciousness:  0-->Alert, keenly responsive (02/13/25 1449)  1b. LOC Questions: 0-->Answers both questions correctly (02/13/25 1449)  1c. LOC Commands: 0-->Performs both tasks correctly (02/13/25 1449)  2. Best Gaze: 0-->Normal (02/13/25 1449)  3. Visual: 0-->No visual loss (02/13/25 1449)  4. Facial Palsy: 0-->Normal symmetrical movements (02/13/25 1449)  5a. Motor Arm, Left: 0-->No drift, limb holds 90 (or 45) degrees for full 10 secs (02/13/25 1449)  5b. Motor Arm, Right: 0-->No drift, limb holds 90 (or 45) degrees for full 10 secs (02/13/25 1449)  6a. Motor Leg, Left: 0-->No drift, leg holds 30 degree position for full 5 secs (02/13/25 1449)  6b. Motor Leg, Right: 0-->No drift, leg holds 30 degree position for full 5 secs (02/13/25 1449)  7. Limb Ataxia: 0-->Absent (02/13/25 1449)  8. Sensory: 0-->Normal, no sensory loss (02/13/25 1449)  9. Best Language: 0-->No aphasia, normal (02/13/25 1449)  10. Dysarthria: 0-->Normal (02/13/25 1449)  11. Extinction and Inattention (formerly Neglect): 0-->No abnormality (02/13/25 1449)    Total (NIH Stroke Scale): 0 (02/13/25 1449)     Additional notable assessment information:       Nursing report ED to floor:  Martine Temple RN   02/13/25 16:07 EST

## 2025-02-13 NOTE — CASE MANAGEMENT/SOCIAL WORK
Discharge Planning Assessment   Sánchez     Patient Name: Luiz Connors  MRN: [id number]  Today's Date: 2/13/2025    Admit Date: 2/13/2025    Plan: Routine home w/ wife   Discharge Needs Assessment       Row Name 02/13/25 1428       Living Environment    People in Home spouse    Name(s) of People in Home wife Vida    Current Living Arrangements home    Potentially Unsafe Housing Conditions none    In the past 12 months has the electric, gas, oil, or water company threatened to shut off services in your home? No    Primary Care Provided by self    Provides Primary Care For no one    Family Caregiver if Needed spouse    Family Caregiver Names Vida    Quality of Family Relationships helpful;involved;supportive    Able to Return to Prior Arrangements yes       Resource/Environmental Concerns    Resource/Environmental Concerns none    Transportation Concerns none       Transportation Needs    In the past 12 months, has lack of transportation kept you from medical appointments or from getting medications? no    In the past 12 months, has lack of transportation kept you from meetings, work, or from getting things needed for daily living? No       Food Insecurity    Within the past 12 months, you worried that your food would run out before you got the money to buy more. Never true    Within the past 12 months, the food you bought just didn't last and you didn't have money to get more. Never true       Transition Planning    Patient/Family Anticipates Transition to home with family    Patient/Family Anticipated Services at Transition none    Transportation Anticipated car, drives self;family or friend will provide       Discharge Needs Assessment    Readmission Within the Last 30 Days no previous admission in last 30 days    Equipment Currently Used at Home none    Concerns to be Addressed denies needs/concerns at this time    Anticipated Changes Related to Illness none    Equipment Needed After Discharge none                    Discharge Plan       Row Name 02/13/25 1428       Plan    Plan Routine home w/ wife    Plan Comments CM met with patient and spouse Vida at the bedside. Confirmed PCP, insurance, and pharmacy. Wife declined M2B. Patient denies any difficulty affording medications. Patient is not current with any HHC/OPPT/OT services. Patient lives at home with his wife, is independent with ADLS/IADLS, and drives. Wife Vida able to provide DC transportation. DC Barriers: Nephrology following, renal US ordered, IVF.                  Continued Care and Services - Admitted Since 2/13/2025    No active coordination exists for this encounter.          Demographic Summary       Row Name 02/13/25 1427       General Information    Admission Type observation    Arrived From emergency department    Required Notices Provided Observation Status Notice    Referral Source admission list    Reason for Consult discharge planning    Preferred Language English       Contact Information    Permission Granted to Share Info With     Contact Information Obtained for                    Functional Status       Row Name 02/13/25 1428       Functional Status    Usual Activity Tolerance good    Current Activity Tolerance good       Functional Status, IADL    Medications independent    Meal Preparation independent    Housekeeping independent    Laundry independent    Shopping independent                Trevon Loco RN      Cell number [phone]  Office number [phone]

## 2025-02-13 NOTE — ED PROVIDER NOTES
Subjective   History of Present Illness  74-year-old male reports feeling ill with a flulike illness with cough, congestion, fatigue for the past 48 hours transferred from work over concerns over confusion.  Patient reportedly had been confused and calling coworkers the wrong name.  They were concerned as well over a possible right facial droop and garbled speech.  Medic denied seeing any focal deficits, patient speech is clear and appropriate without any deficits on my exam upon his arrival.      Review of Systems   Constitutional:  Positive for fatigue.   HENT:  Positive for congestion.    Respiratory:  Positive for cough.        No past medical history on file.    No Known Allergies    No past surgical history on file.    No family history on file.    Social History     Socioeconomic History    Marital status:            Objective   Physical Exam  Constitutional:       Appearance: Normal appearance.   HENT:      Head: Normocephalic and atraumatic.      Mouth/Throat:      Mouth: Mucous membranes are moist.      Pharynx: Oropharynx is clear.   Eyes:      Extraocular Movements: Extraocular movements intact.      Conjunctiva/sclera: Conjunctivae normal.      Pupils: Pupils are equal, round, and reactive to light.   Cardiovascular:      Rate and Rhythm: Normal rate and regular rhythm.      Heart sounds: Normal heart sounds.   Pulmonary:      Effort: Pulmonary effort is normal.      Breath sounds: Normal breath sounds.   Abdominal:      General: Bowel sounds are normal. There is no distension.      Palpations: Abdomen is soft.      Tenderness: There is no abdominal tenderness.   Musculoskeletal:         General: Normal range of motion.   Skin:     General: Skin is warm and dry.      Capillary Refill: Capillary refill takes less than 2 seconds.   Neurological:      Mental Status: He is alert and oriented to person, place, and time.      Cranial Nerves: No cranial nerve deficit.      Sensory: No sensory deficit.       Motor: No weakness.   Psychiatric:         Mood and Affect: Mood normal.         Behavior: Behavior normal.         Procedures           ED Course                                                       Medical Decision Making  Patient is a little hard of hearing but otherwise has been appropriate and oriented during his ED stay.  Patient does have an RAÚL in the setting of influenza A.  Given this and his advanced age and reported intermittent confusion, will observe in the hospital.  Regarding possible hardware issue, patient denies any neck pain.  Patient does report falling several weeks ago having some neck pain at that time but he did not seek care and felt better afterwards.  Patient states his neck surgery was done years ago and cannot remember where or who did it.          Problems Addressed:  RAÚL (acute kidney injury): complicated acute illness or injury  Altered mental status, unspecified altered mental status type: complicated acute illness or injury  Influenza A: complicated acute illness or injury    Amount and/or Complexity of Data Reviewed  External Data Reviewed: labs.     Details: BUN/creatinine 8/8/2022 17/0.82  Labs: ordered.     Details: Influenza A positive  Radiology: ordered.  ECG/medicine tests: ordered and independent interpretation performed.     Details: EKG interpretation: Normal sinus rhythm, rate 60, no acute ST change  Discussion of management or test interpretation with external provider(s): Case discussed with hospitalist, Caitlin agrees with plan.    Risk  Decision regarding hospitalization.        Final diagnoses:   Altered mental status, unspecified altered mental status type   Influenza A   RAÚL (acute kidney injury)       ED Disposition  ED Disposition       ED Disposition   Decision to Admit    Condition   --    Comment   Level of Care: Telemetry [5]   Admitting Physician: ERNESTO MÉNDEZ [[id number]]                 No follow-up provider specified.       Medication List      No  changes were made to your prescriptions during this visit.            Dhaval Story MD  02/13/25 4036

## 2025-02-13 NOTE — H&P
[facility] Medicine Services  History & Physical    Patient Name: Luiz Connors  : 1950  MRN: [id number]  Primary Care Physician:  Joie Issa APRN  Date of admission: 2025  Date and Time of Service: 2025 at 0812    Subjective      Chief Complaint: Flu-like symptoms    History of Present Illness: Luiz Connors is a 74 y.o. male with a CMH of CAD with CABG, nonischemic cardiomyopathy,, HLD, HTN, diabetes mellitus who presented to [facility] on 2025 with flulike symptoms.  Patient endorses symptoms of fatigue cough and congestion for the past 2 days.  There are reports of patient going to work today in which he started develop confusion as well as possible right facial droop and difficulty speaking.  However per EMS reports, symptoms not present on their evaluation.  Patient currently denies headache, vision changes, speech changes, unilateral weakness/paresthesias.  Denies history of CVA.  Patient reports being unvaccinated for influenza this year.    On ED evaluation, patient with no focal deficits or aphasia.  Patient was noted to be hypotensive on arrival with blood pressure of 88/66.  Patient was given 1 L IV fluids with improvement of blood pressure.  Labs are pertinent for creatinine 2.23, BUN 38, glucose 284. CBC was unremarkable.  Procalcitonin was noted to be 0.29.  Ammonia was 24.  UA not concerning for UTI.  Respiratory panel was positive for influenza A.  Chest x-ray with no acute findings.  CT head with remote right posterior infarct as well as C3 screw that appears to be fractured otherwise no acute findings.  Hospital service to admit for further evaluation and management.      Review of Systems   Constitutional:  Positive for fatigue. Negative for chills and fever.   HENT:  Positive for congestion.    Respiratory:  Positive for cough.    Cardiovascular:  Negative for chest pain.   Gastrointestinal:  Negative for abdominal pain, nausea and vomiting.        Personal History     No past medical history on file.    No past surgical history on file.    Family History: family history is not on file. Otherwise pertinent FHx was reviewed and not pertinent to current issue.    Social History:      Home Medications:  Prior to Admission Medications       None              Allergies:  No Known Allergies    Objective      Vitals:   Temp:  [97.6 °F (36.4 °C)] 97.6 °F (36.4 °C)  Heart Rate:  [53-73] 68  Resp:  [17-20] 17  BP: ()/(41-83) 119/55  Body mass index is 28.19 kg/m².    Physical Exam  General: 75 yo WM, Alert and oriented to self and place, well nourished, no acute distress.  HENT: Normocephalic, normal hearing, moist oral mucosa, no scleral icterus.  Neck: Supple, nontender, no carotid bruits, no JVD, no LAD.  Lungs: Clear to auscultation, nonlabored respiration.  Heart: RRR, no murmur, gallop or edema.  Abdomen: Soft, nontender, nondistended, + bowel sounds.  Musculoskeletal: Normal range of motion and strength, no tenderness or swelling.  Skin: Skin is warm, dry and pink, no rashes or lesions.  Psychiatric: Cooperative, appropriate mood and affect.  Neurologic: Equal strength bilaterally. No focal motor or sensory deficits appreciated. No facial droop or aphasia present.        Diagnostic Data:  Lab Results (last 24 hours)       Procedure Component Value Units Date/Time    Urinalysis, Microscopic Only - Urine, Clean Catch [[id number]]  (Abnormal) Collected: 02/13/25 0806    Specimen: Urine, Clean Catch Updated: 02/13/25 0839     RBC, UA 0-2 /HPF      WBC, UA 0-2 /HPF      Comment: Urine culture not indicated.        Bacteria, UA 2+ /HPF      Squamous Epithelial Cells, UA 3-6 /HPF      Hyaline Casts, UA 3-6 /LPF      Methodology Manual Light Microscopy    Urinalysis With Culture If Indicated - Urine, Clean Catch [[id number]]  (Abnormal) Collected: 02/13/25 0806    Specimen: Urine, Clean Catch Updated: 02/13/25 0816     Color, UA Dark Yellow     Appearance, UA  Slightly Cloudy     Comment: Result checked          pH, UA <=5.0     Specific Gravity, UA 1.026     Glucose, UA Negative     Ketones, UA Trace     Bilirubin, UA Negative     Blood, UA Negative     Protein,  mg/dL (2+)     Leuk Esterase, UA Negative     Nitrite, UA Negative     Urobilinogen, UA 1.0 E.U./dL    Narrative:      In absence of clinical symptoms, the presence of pyuria, bacteria, and/or nitrites on the urinalysis result does not correlate with infection.    Respiratory Panel PCR w/COVID-19(SARS-CoV-2) [facility]AB[facility]/NANDO/ONELIA/PAD/COR/TREVOR In-House, NP Swab in UTM/VTM, 2 HR TAT - Swab, Nasopharynx [[id number]]  (Abnormal) Collected: 02/13/25 0649    Specimen: Swab from Nasopharynx Updated: 02/13/25 0747     ADENOVIRUS, PCR Not Detected     Coronavirus 229E Not Detected     Coronavirus HKU1 Not Detected     Coronavirus NL63 Not Detected     Coronavirus OC43 Not Detected     COVID19 Not Detected     Human Metapneumovirus Not Detected     Human Rhinovirus/Enterovirus Not Detected     Influenza A H1 2009 PCR Detected     Influenza B PCR Not Detected     Parainfluenza Virus 1 Not Detected     Parainfluenza Virus 2 Not Detected     Parainfluenza Virus 3 Not Detected     Parainfluenza Virus 4 Not Detected     RSV, PCR Not Detected     Bordetella pertussis pcr Not Detected     Bordetella parapertussis PCR Not Detected     Chlamydophila pneumoniae PCR Not Detected     Mycoplasma pneumo by PCR Not Detected    Narrative:      In the setting of a positive respiratory panel with a viral infection PLUS a negative procalcitonin without other underlying concern for bacterial infection, consider observing off antibiotics or discontinuation of antibiotics and continue supportive care. If the respiratory panel is positive for atypical bacterial infection (Bordetella pertussis, Chlamydophila pneumoniae, or Mycoplasma pneumoniae), consider antibiotic de-escalation to target atypical bacterial infection.    Procalcitonin [[id number]]   "(Abnormal) Collected: 02/13/25 0648    Specimen: Blood Updated: 02/13/25 0735     Procalcitonin 0.29 ng/mL     Narrative:      As a Marker for Sepsis (Non-Neonates):    1. <0.5 ng/mL represents a low risk of severe sepsis and/or septic shock.  2. >2 ng/mL represents a high risk of severe sepsis and/or septic shock.    As a Marker for Lower Respiratory Tract Infections that require antibiotic therapy:    PCT on Admission    Antibiotic Therapy       6-12 Hrs later    >0.5                Strongly Recommended  >0.25 - <0.5        Recommended   0.1 - 0.25          Discouraged              Remeasure/reassess PCT  <0.1                Strongly Discouraged     Remeasure/reassess PCT    As 28 day mortality risk marker: \"Change in Procalcitonin Result\" (>80% or <=80%) if Day 0 (or Day 1) and Day 4 values are available. Refer to [email or web address]    Change in PCT <=80%  A decrease of PCT levels below or equal to 80% defines a positive change in PCT test result representing a higher risk for 28-day all-cause mortality of patients diagnosed with severe sepsis for septic shock.    Change in PCT >80%  A decrease of PCT levels of more than 80% defines a negative change in PCT result representing a lower risk for 28-day all-cause mortality of patients diagnosed with severe sepsis or septic shock.       Comprehensive Metabolic Panel [[id number]]  (Abnormal) Collected: 02/13/25 0648    Specimen: Blood Updated: 02/13/25 0735     Glucose 284 mg/dL      BUN 38 mg/dL      Creatinine 2.23 mg/dL      Sodium 138 mmol/L      Potassium 4.4 mmol/L      Chloride 99 mmol/L      CO2 22.2 mmol/L      Calcium 9.2 mg/dL      Total Protein 7.9 g/dL      Albumin 4.0 g/dL      ALT (SGPT) 25 U/L      AST (SGOT) 40 U/L      Alkaline Phosphatase 51 U/L      Total Bilirubin 0.3 mg/dL      Globulin 3.9 gm/dL      A/G Ratio 1.0 g/dL      BUN/Creatinine Ratio 17.0     Anion Gap 16.8 mmol/L      eGFR 30.0 mL/min/1.73     Narrative:      GFR " Categories in Chronic Kidney Disease (CKD)      GFR Category          GFR (mL/min/1.73)    Interpretation  G1                     90 or greater         Normal or high (1)  G2                      60-89                Mild decrease (1)  G3a                   45-59                Mild to moderate decrease  G3b                   30-44                Moderate to severe decrease  G4                    15-29                Severe decrease  G5                    14 or less           Kidney failure          (1)In the absence of evidence of kidney disease, neither GFR category G1 or G2 fulfill the criteria for CKD.    eGFR calculation 2021 CKD-EPI creatinine equation, which does not include race as a factor    Ammonia [[id number]]  (Normal) Collected: 02/13/25 0648    Specimen: Blood Updated: 02/13/25 0717     Ammonia 24 umol/L     CBC & Differential [[id number]]  (Abnormal) Collected: 02/13/25 0648    Specimen: Blood Updated: 02/13/25 0700    Narrative:      The following orders were created for panel order CBC & Differential.  Procedure                               Abnormality         Status                     ---------                               -----------         ------                     CBC Auto Differential[[id number]]        Abnormal            Final result                 Please view results for these tests on the individual orders.    CBC Auto Differential [[id number]]  (Abnormal) Collected: 02/13/25 0648    Specimen: Blood Updated: 02/13/25 0700     WBC 8.60 10*3/mm3      RBC 4.76 10*6/mm3      Hemoglobin 13.7 g/dL      Hematocrit 43.9 %      MCV 92.2 fL      MCH 28.8 pg      MCHC 31.2 g/dL      RDW 13.3 %      RDW-SD 45.2 fl      MPV 10.9 fL      Platelets 258 10*3/mm3      Neutrophil % 73.7 %      Lymphocyte % 16.7 %      Monocyte % 8.8 %      Eosinophil % 0.2 %      Basophil % 0.3 %      Immature Grans % 0.3 %      Neutrophils, Absolute 6.32 10*3/mm3      Lymphocytes, Absolute 1.44 10*3/mm3      Monocytes,  Absolute 0.76 10*3/mm3      Eosinophils, Absolute 0.02 10*3/mm3      Basophils, Absolute 0.03 10*3/mm3      Immature Grans, Absolute 0.03 10*3/mm3      nRBC 0.0 /100 WBC     Extra Tubes [[id number]] Collected: 02/13/25 0648    Specimen: Blood, Venous Line Updated: 02/13/25 0700    Narrative:      The following orders were created for panel order Extra Tubes.  Procedure                               Abnormality         Status                     ---------                               -----------         ------                     Gold Top - SST[[id number]]                                   Final result               Light Blue Top[[id number]]                                   Final result                 Please view results for these tests on the individual orders.    Gold Top - SST [[id number]] Collected: 02/13/25 0648    Specimen: Blood Updated: 02/13/25 0700     Extra Tube Hold for add-ons.     Comment: Auto resulted.       Light Blue Top [[id number]] Collected: 02/13/25 0658    Specimen: Blood Updated: 02/13/25 0700     Extra Tube Hold for add-ons.     Comment: Auto resulted       POC Glucose Once [[id number]]  (Abnormal) Collected: 02/13/25 0622    Specimen: Blood Updated: 02/13/25 0624     Glucose 248 mg/dL      Comment: Serial Number: [id number]:  [id number]                Imaging Results (Last 24 Hours)       Procedure Component Value Units Date/Time    XR Spine Cervical 2 or 3 View [[id number]] Collected: 02/13/25 0849     Updated: 02/13/25 0853    Narrative:      XR SPINE CERVICAL 2 OR 3 VW    Date of Exam: 2/13/2025 8:10 AM EST    Indication: eval hardware    Comparison: None available.    Findings:  No acute fracture is identified. The alignment is anatomic. There are changes from C4 corpectomy with cage body graft with anterior cervical discectomy and fusion at C3-C5. One or both of the C3 screws are fractured. There are moderate discogenic changes   at C5-6 and C6-7. There is moderate scattered facet  arthropathy and uncovertebral hypertrophy. The prevertebral soft tissues are unremarkable.      Impression:      Impression:  1.Changes from C4 corpectomy with ACDF at C3-C5. One or both of the C3 screws are fractured.  2.Multilevel degenerative changes as described above.        Electronically Signed: Demario Bettencourt MD    2/13/2025 8:51 AM EST    Workstation ID: [id number]    CT Head Without Contrast [[id number]] Collected: 02/13/25 0726     Updated: 02/13/25 0733    Narrative:      CT HEAD WO CONTRAST    Date of Exam: 2/13/2025 7:12 AM EST    Indication: ams.    Comparison: None available.    Technique: Axial CT images were obtained of the head without contrast administration.  Coronal reconstructions were performed.  Automated exposure control and iterative reconstruction methods were used.      Findings:  There is no intracranial hemorrhage. No mass effect or midline shift. There is an area of encephalomalacia involving the right posterior temporal lobe related to sequela of remote infarct. Mild white matter findings suggesting chronic microvascular   disease. No intraventricular hemorrhage. The posterior fossa is without acute abnormality. Globes are intact and symmetric. Slight rightward deviation of the nasal septum. The mastoid air cells are well-aerated. There is no sinus fluid level. No acute   calvarial fracture. Cervical ACDF hardware noted at the upper cervical spine on the  radiograph with corpectomy cage. C3 screw appears fractured.      Impression:      Impression:  1. No acute intracranial abnormality.  2. Remote right posterior temporal infarct with encephalomalacia.  3. Cervical ACDF hardware partially imaged on  radiograph. C3 screw appears fractured, cervical radiographs may be helpful to better assess.          Electronically Signed: Demetrio Hall MD    2/13/2025 7:31 AM EST    Workstation ID: [id number]    XR Chest 1 View [[id number]] Collected: 02/13/25 0652     Updated: 02/13/25 0655     Narrative:      XR CHEST 1 VW    Date of Exam: 2/13/2025 6:49 AM EST    Indication: cough    Comparison: None available.    Findings:  There are no airspace consolidations. No pleural fluid. No pneumothorax. The pulmonary vasculature appears within normal limits. The cardiac and mediastinal silhouette appear unremarkable. No acute osseous abnormality identified.      Impression:      Impression:  No acute cardiopulmonary process.    Electronically Signed: Beth Munoz MD    2/13/2025 6:52 AM EST    Workstation ID: [id number]              Assessment & Plan        This is a 74 y.o. male with:    Active and Resolved Problems  Active Hospital Problems    Diagnosis  POA    **Influenza A [J10.1]  Yes      Resolved Hospital Problems   No resolved problems to display.       Altered mental status  Oriented to self and place only, baseline A&O x 4  Likely secondary to influenza versus ARF, however cannot exclude CVA given CT head findings and no reports of CVA in the past  MRI brain pending  CTH with remote right posterior temporal infarct with encephalomalacia  Neurochecks, NIHSS  May need neurology consult depending on MRI results  Fall precautions    Influenza A  Unvaccinated  CXR with no acute findings, no concern for superimposed bacterial pneumonia  Not on supplemental oxygen  Symptomatic management    Acute kidney injury  Creatinine 2.23, BUN 38, previous baseline 0.8-0.9  Renal ultrasound pending  IVFs  Avoid nephrotoxic medications, holding losartan  Continue to monitor BMP  Nephrology consulted     CAD status post CABG  Nonischemic cardiomyopathy  Hypertension  Hyperlipidemia  Holding losartan in the setting of RAÚL  Continue amlodipine, Imdur  Continue ASA, Plavix    Type 2 diabetes mellitus  Mod SSI, hold glipizide  Consistent carb diet  Hypoglycemia protocol         VTE Prophylaxis:  No VTE prophylaxis order currently exists.        The patient desires to be as follows:    CODE STATUS:    Code Status (Patient has no  pulse and is not breathing): CPR (Attempt to Resuscitate)  Medical Interventions (Patient has pulse or is breathing): Full Support        Vida Burris, who can be contacted at [phone], is the designated person to make medical decisions on the patient's behalf if He is incapable of doing so. This was clarified with patient and/or next of kin on 2/13/2025 during the course of this H&P.    Admission Status:  I believe this patient meets inpatient status.    Expected Length of Stay: 2 to 3 days    PDMP and Medication Dispenses via Sidebar reviewed and consistent with patient reported medications.    I discussed the patient's findings and my recommendations with patient.      Signature:     This document has been electronically signed by Roderick Reyna PA-C on February 13, 2025 11:53 [facility] Hospitalist Team

## 2025-02-14 ENCOUNTER — APPOINTMENT (OUTPATIENT)
Dept: MRI IMAGING | Facility: HOSPITAL | Age: 75
DRG: 065 | End: 2025-02-14
Payer: MEDICARE

## 2025-02-14 PROBLEM — I63.81 ACUTE ISCHEMIC VBA THALAMIC STROKE, LEFT: Status: ACTIVE | Noted: 2025-02-14

## 2025-02-14 PROBLEM — I63.9 ACUTE ISCHEMIC STROKE: Status: ACTIVE | Noted: 2025-02-14

## 2025-02-14 LAB
CHOLEST SERPL-MCNC: 159 MG/DL (ref 0–200)
FOLATE SERPL-MCNC: 16.6 NG/ML (ref 4.78–24.2)
GLUCOSE BLDC GLUCOMTR-MCNC: 118 MG/DL (ref 70–105)
GLUCOSE BLDC GLUCOMTR-MCNC: 141 MG/DL (ref 70–105)
GLUCOSE BLDC GLUCOMTR-MCNC: 147 MG/DL (ref 70–105)
GLUCOSE BLDC GLUCOMTR-MCNC: 157 MG/DL (ref 70–105)
GLUCOSE BLDC GLUCOMTR-MCNC: 221 MG/DL (ref 70–105)
HBA1C MFR BLD: 9.22 % (ref 4.8–5.6)
HDLC SERPL-MCNC: 30 MG/DL (ref 40–60)
LDLC SERPL CALC-MCNC: 113 MG/DL (ref 0–100)
LDLC/HDLC SERPL: 3.73 {RATIO}
QT INTERVAL: 438 MS
QTC INTERVAL: 434 MS
T4 FREE SERPL-MCNC: 1.18 NG/DL (ref 0.93–1.7)
TRIGL SERPL-MCNC: 85 MG/DL (ref 0–150)
TSH SERPL DL<=0.05 MIU/L-ACNC: 4.61 UIU/ML (ref 0.27–4.2)
VIT B12 BLD-MCNC: 428 PG/ML (ref 211–946)
VLDLC SERPL-MCNC: 16 MG/DL (ref 5–40)

## 2025-02-14 PROCEDURE — 92523 SPEECH SOUND LANG COMPREHEN: CPT

## 2025-02-14 PROCEDURE — 99223 1ST HOSP IP/OBS HIGH 75: CPT | Performed by: NURSE PRACTITIONER

## 2025-02-14 PROCEDURE — 82948 REAGENT STRIP/BLOOD GLUCOSE: CPT

## 2025-02-14 PROCEDURE — 70544 MR ANGIOGRAPHY HEAD W/O DYE: CPT

## 2025-02-14 PROCEDURE — 83036 HEMOGLOBIN GLYCOSYLATED A1C: CPT | Performed by: STUDENT IN AN ORGANIZED HEALTH CARE EDUCATION/TRAINING PROGRAM

## 2025-02-14 PROCEDURE — 70551 MRI BRAIN STEM W/O DYE: CPT

## 2025-02-14 PROCEDURE — 97161 PT EVAL LOW COMPLEX 20 MIN: CPT

## 2025-02-14 PROCEDURE — 97165 OT EVAL LOW COMPLEX 30 MIN: CPT

## 2025-02-14 PROCEDURE — 63710000001 INSULIN LISPRO (HUMAN) PER 5 UNITS

## 2025-02-14 PROCEDURE — 70547 MR ANGIOGRAPHY NECK W/O DYE: CPT

## 2025-02-14 PROCEDURE — 25810000003 SODIUM CHLORIDE 0.9 % SOLUTION

## 2025-02-14 RX ORDER — SODIUM CHLORIDE 9 MG/ML
40 INJECTION, SOLUTION INTRAVENOUS AS NEEDED
Status: DISCONTINUED | OUTPATIENT
Start: 2025-02-14 | End: 2025-02-16 | Stop reason: HOSPADM

## 2025-02-14 RX ORDER — SODIUM CHLORIDE 0.9 % (FLUSH) 0.9 %
10 SYRINGE (ML) INJECTION AS NEEDED
Status: DISCONTINUED | OUTPATIENT
Start: 2025-02-14 | End: 2025-02-16 | Stop reason: HOSPADM

## 2025-02-14 RX ORDER — ATORVASTATIN CALCIUM 40 MG/1
80 TABLET, FILM COATED ORAL NIGHTLY
Status: DISCONTINUED | OUTPATIENT
Start: 2025-02-14 | End: 2025-02-16 | Stop reason: HOSPADM

## 2025-02-14 RX ORDER — SODIUM CHLORIDE 0.9 % (FLUSH) 0.9 %
10 SYRINGE (ML) INJECTION EVERY 12 HOURS SCHEDULED
Status: DISCONTINUED | OUTPATIENT
Start: 2025-02-14 | End: 2025-02-16 | Stop reason: HOSPADM

## 2025-02-14 RX ADMIN — SODIUM CHLORIDE 125 ML/HR: 9 INJECTION, SOLUTION INTRAVENOUS at 01:54

## 2025-02-14 RX ADMIN — Medication 10 ML: at 12:36

## 2025-02-14 RX ADMIN — ISOSORBIDE MONONITRATE 60 MG: 60 TABLET, EXTENDED RELEASE ORAL at 09:36

## 2025-02-14 RX ADMIN — CLOPIDOGREL BISULFATE 75 MG: 75 TABLET ORAL at 09:36

## 2025-02-14 RX ADMIN — ASPIRIN 81 MG: 81 TABLET, COATED ORAL at 09:36

## 2025-02-14 RX ADMIN — AMLODIPINE BESYLATE 5 MG: 5 TABLET ORAL at 09:36

## 2025-02-14 RX ADMIN — INSULIN LISPRO 2 UNITS: 100 INJECTION, SOLUTION INTRAVENOUS; SUBCUTANEOUS at 16:30

## 2025-02-14 NOTE — CONSULTS
Primary Care Provider: Provider, No Known     Consult requested by:  Dr. Ambrosio     Reason for Consultation: Neurological evaluation      History taken from: patient chart RN    Chief complaint: confusion, right facial droop, difficulty speaking        SUBJECTIVE:    History of present illness: Background per H&P: Luiz Connors is a 74 y.o. male who  presented to [facility] on 2/13/2025 with flulike symptoms.  Patient endorses symptoms of fatigue cough and congestion for the past 2 days.  There are reports of patient going to work today in which he started develop confusion as well as possible right facial droop and difficulty speaking.  However per EMS reports, symptoms not present on their evaluation.  Patient currently denies headache, vision changes, speech changes, unilateral weakness/paresthesias.  Denies history of CVA.  Patient reports being unvaccinated for influenza this year.     On ED evaluation, patient with no focal deficits or aphasia.  Patient was noted to be hypotensive on arrival with blood pressure of 88/66.  Patient was given 1 L IV fluids with improvement of blood pressure.  Labs are pertinent for creatinine 2.23, BUN 38, glucose 284. CBC was unremarkable.  Procalcitonin was noted to be 0.29.  Ammonia was 24.  UA not concerning for UTI.  Respiratory panel was positive for influenza A.  Chest x-ray with no acute findings.  CT head with remote right posterior infarct as well as C3 screw that appears to be fractured otherwise no acute findings.    - Portions of the above HPI were copied from previous encounters and edited as appropriate. PMH as detailed below.     Pt states he feels better today. Denies any neuro complaints. Pt states he missed a few days of medication, including ASA and Plavix, due to feeling bad.     Review of Systems   Constitutional:  Negative for chills, diaphoresis and fatigue.   Eyes:  Negative for photophobia and visual disturbance.   Neurological:  Negative for  dizziness, tremors, seizures, syncope, facial asymmetry, speech difficulty, light-headedness, numbness and headaches.          PATIENT HISTORY:  History reviewed. No pertinent past medical history., History reviewed. No pertinent surgical history., History reviewed. No pertinent family history.,   Social History     Tobacco Use    Smoking status: Former     Types: Cigarettes     Start date: 1975    Smokeless tobacco: Never   Vaping Use    Vaping status: Never Used   Substance Use Topics    Drug use: Never   ,   Prior to Admission medications    Medication Sig Start Date End Date Taking? Authorizing Provider   amLODIPine (NORVASC) 5 MG tablet Take 1 tablet by mouth Daily.   Yes Leonor Reyes MD   aspirin 81 MG EC tablet Take 1 tablet by mouth Daily.   Yes Leonor Reyes MD   clopidogrel (PLAVIX) 75 MG tablet Take 1 tablet by mouth Daily.   Yes Leonor Reyes MD   gemfibrozil (LOPID) 600 MG tablet Take 1 tablet by mouth Daily.   Yes Leonor Reyes MD   glipizide (GLUCOTROL) 5 MG tablet Take 0.5 tablets by mouth Daily.   Yes Leonor Reyes MD   isosorbide mononitrate (IMDUR) 60 MG 24 hr tablet Take 1 tablet by mouth Daily.   Yes Leonor Reyes MD   losartan (COZAAR) 50 MG tablet Take 1 tablet by mouth Daily.   Yes Leonor Reyes MD   metFORMIN ER (GLUCOPHAGE-XR) 500 MG 24 hr tablet Take 1 tablet by mouth 2 (Two) Times a Day.   Yes Leonor eRyes MD   acetaminophen (TYLENOL) 325 MG tablet Take 2 tablets by mouth Every 12 (Twelve) Hours As Needed for Mild Pain.    ProviderLeonor MD    Allergies:  Patient has no known allergies.    Current Facility-Administered Medications   Medication Dose Route Frequency Provider Last Rate Last Admin    acetaminophen (TYLENOL) tablet 650 mg  650 mg Oral Q4H PRN Roderick Reyna PA-C        Or    acetaminophen (TYLENOL) 160 MG/5ML oral solution 650 mg  650 mg Oral Q4H PRN Roderick Reyna PA-C        Or    acetaminophen  (TYLENOL) suppository 650 mg  650 mg Rectal Q4H PRN Roderick Reyna PA-C        amLODIPine (NORVASC) tablet 5 mg  5 mg Oral Daily Roderick Reyna PA-C   5 mg at 02/14/25 0936    aspirin EC tablet 81 mg  81 mg Oral Daily Roderick Reyna PA-C   81 mg at 02/14/25 0936    atorvastatin (LIPITOR) tablet 80 mg  80 mg Oral Nightly Sav Ambrosio MD        sennosides-docusate (PERICOLACE) 8.6-50 MG per tablet 2 tablet  2 tablet Oral BID PRN Roderick Reyna PA-C        And    polyethylene glycol (MIRALAX) packet 17 g  17 g Oral Daily PRN Roderick Reyna PA-C        And    bisacodyl (DULCOLAX) EC tablet 5 mg  5 mg Oral Daily PRN Roderick Reyna PA-C        And    bisacodyl (DULCOLAX) suppository 10 mg  10 mg Rectal Daily PRN Roderick Reyna PA-C        Calcium Replacement - Follow Nurse / BPA Driven Protocol   Not Applicable PRN Roderick Reyna PA-C        clopidogrel (PLAVIX) tablet 75 mg  75 mg Oral Daily Roderick Reyna PA-C   75 mg at 02/14/25 0936    dextrose (D50W) (25 g/50 mL) IV injection 25 g  25 g Intravenous Q15 Min PRN Roderick Reyna PA-C        dextrose (GLUTOSE) oral gel 15 g  15 g Oral Q15 Min PRN Roderick Reyna PA-C        glucagon (GLUCAGEN) injection 1 mg  1 mg Intramuscular Q15 Min PRN Roderick Reyna PA-C        insulin lispro (HUMALOG/ADMELOG) injection 2-9 Units  2-9 Units Subcutaneous 4x Daily AC & at Bedtime Roderick Reyna PA-C   4 Units at 02/13/25 2148    isosorbide mononitrate (IMDUR) 24 hr tablet 60 mg  60 mg Oral Daily Roderick Reyna PA-C   60 mg at 02/14/25 0936    [Held by provider] losartan (COZAAR) tablet 50 mg  50 mg Oral Daily Almasri, Sawsan, PA-C        Magnesium Standard Dose Replacement - Follow Nurse / BPA Driven Protocol   Not Applicable PRN Roderick Reyna PA-C        melatonin tablet 5 mg  5 mg Oral Nightly PRN Roderick Reyna PA-C        ondansetron ODT (ZOFRAN-ODT) disintegrating tablet 4 mg  4 mg Oral Q6H PRN Roderick Reyna PA-C        Or     ondansetron (ZOFRAN) injection 4 mg  4 mg Intravenous Q6H PRN Roderick Reyna PA-C        Phosphorus Replacement - Follow Nurse / BPA Driven Protocol   Not Applicable Roderick Ordonez PA-C        Potassium Replacement - Follow Nurse / BPA Driven Protocol   Not Applicable PRN Roderick Reyna PA-C        sodium chloride 0.9 % flush 10 mL  10 mL Intravenous Q12H Aguilar, Maira, APRN        sodium chloride 0.9 % flush 10 mL  10 mL Intravenous PRN Aguilar, Maira, APRN        sodium chloride 0.9 % infusion 40 mL  40 mL Intravenous PRN Aguilar, Maira, APRN        sodium chloride 0.9 % infusion  125 mL/hr Intravenous Continuous Roderick Reyna PA-C 125 mL/hr at 02/14/25 0154 125 mL/hr at 02/14/25 0154        ________________________________________________________        OBJECTIVE:  Upon today's exam, pt is awake and alert. Wife at BS. Pt is hard of hearing at baseline.     PHYSICAL EXAM:    CONSTITUTIONAL: The patient is in no apparent distress, bright awake and alert.      HEENT: There is no tenderness over the temporal arteries bilaterally. Normocephalic, atraumatic. TMJ open symmetrically without tenderness.    NECK: ROM normal, supple    RESPIRATORY: Breathing unlabored, Breath sounds are normal    CARDIAC: Regular rate and rhythm.     EXTREMITIES:  No clubbing, cyanosis or edema.    PSYCHIATRIC: Mood/affect normal, judgement normal, appropriate    NEUROLOGICAL:    Cognition:   Fully oriented.  Fund of knowledge excellent.  Concentration and attention normal.   Language normal with normal comprehension, fluent speech, intact repetition and naming.   Short and long term memory appears intact    Cranial nerves;    II - pupils bilaterally equal reacting to light,  No new Visual field deficits;  Fundoscopic exam- Not able to be done, non-dilated exam  III,IV,VI: EOMI with no diplopia  V: Normal facial sensations  VII: No facial asymmetry,  VIII: No New hearing abnormality (chronic hearing loss)  IX, X, XI:  normal gag and shoulder shrug;  XII: tongue is in the midline.    Sensory:  Intact to light touch in all extremities.     Motor: Strength 5/5 bilaterally upper and lower extremities. Drift in RUE. No involuntary movements present. Normal tone and bulk.  Deep tendon reflexes: 1/4 and symmetrical in biceps, brachioradialis, triceps, bilateral 0/4 knees and ankles. Left toes mute, right toes upgoing     Cerebellar: Finger to nose and mirror movements normal bilaterally.    Gait and balance: deferred      NIH Stroke Scale  Interval: 24 hrs post onset of symptoms +/- 20 mins  1a. Level of Consciousness: 0-->Alert, keenly responsive  1b. LOC Questions: 0-->Answers both questions correctly  1c. LOC Commands: 0-->Performs both tasks correctly  2. Best Gaze: 0-->Normal  3. Visual: 0-->No visual loss  4. Facial Palsy: 0-->Normal symmetrical movements  5a. Motor Arm, Left: 0-->No drift, limb holds 90 (or 45) degrees for full 10 secs  5b. Motor Arm, Right: 1-->Drift, limb holds 90 (or 45) degrees, but drifts down before full 10 secs, does not hit bed or other support  6a. Motor Leg, Left: 0-->No drift, leg holds 30 degree position for full 5 secs  6b. Motor Leg, Right: 0-->No drift, leg holds 30 degree position for full 5 secs  7. Limb Ataxia: 0-->Absent  8. Sensory: 0-->Normal, no sensory loss  9. Best Language: 0-->No aphasia, normal  10. Dysarthria: 0-->Normal  11. Extinction and Inattention (formerly Neglect): 0-->No abnormality  Total (NIH Stroke Scale): 1         ________________________________________________________   RESULTS REVIEW:    VITAL SIGNS:   Temp:  [97.3 °F (36.3 °C)-97.9 °F (36.6 °C)] 97.9 °F (36.6 °C)  Heart Rate:  [55-78] 78  Resp:  [15-21] 19  BP: (102-146)/(34-98) 102/72     LABS:      Lab 02/13/25  0648   WBC 8.60   HEMOGLOBIN 13.7   HEMATOCRIT 43.9   PLATELETS 258   NEUTROS ABS 6.32   IMMATURE GRANS (ABS) 0.03   LYMPHS ABS 1.44   MONOS ABS 0.76   EOS ABS 0.02   MCV 92.2   PROCALCITONIN 0.29*          Lab 02/13/25  0648   SODIUM 138   POTASSIUM 4.4   CHLORIDE 99   CO2 22.2   ANION GAP 16.8*   BUN 38*   CREATININE 2.23*   EGFR 30.0*   GLUCOSE 284*   CALCIUM 9.2   TSH 4.610*         Lab 02/13/25  0648   TOTAL PROTEIN 7.9   ALBUMIN 4.0   GLOBULIN 3.9   ALT (SGPT) 25   AST (SGOT) 40   BILIRUBIN 0.3   ALK PHOS 51                     UA          2/13/2025    08:06   Urinalysis   Squamous Epithelial Cells, UA 3-6    Specific Gravity, UA 1.026    Ketones, UA Trace    Blood, UA Negative    Leukocytes, UA Negative    Nitrite, UA Negative    RBC, UA 0-2    WBC, UA 0-2    Bacteria, UA 2+        Lab Results   Component Value Date    TSH 4.610 (H) 02/13/2025       IMAGING STUDIES:  MRI Brain Without Contrast    Result Date: 2/14/2025  Impression: 1. Acute infarct involving anterior left thalamus. 2. Encephalomalacia involving right posterior temporal lobe related to sequela of remote infarct. 3. Generalized cerebral atrophy with mild white matter findings of chronic microvascular disease. I called findings to [facility] and discussed with patient's nurse Annia at 7:23 a.m. on 2/14/2025. Electronically Signed: Demetrio Hall MD  2/14/2025 7:26 AM EST  Workstation ID: [id number]     Renal Bilateral    Result Date: 2/13/2025  Impression: No hydronephrosis or sonographically evident nephrolithiasis. Electronically Signed: Nestor Elaine  2/13/2025 7:02 PM EST  Workstation ID: [id number]    XR Spine Cervical 2 or 3 View    Result Date: 2/13/2025  Impression: 1.Changes from C4 corpectomy with ACDF at C3-C5. One or both of the C3 screws are fractured. 2.Multilevel degenerative changes as described above. Electronically Signed: Demario Bettencourt MD  2/13/2025 8:51 AM EST  Workstation ID: [id number]    CT Head Without Contrast    Result Date: 2/13/2025  Impression: 1. No acute intracranial abnormality. 2. Remote right posterior temporal infarct with encephalomalacia. 3. Cervical ACDF hardware partially imaged on  radiograph. C3 screw  appears fractured, cervical radiographs may be helpful to better assess. Electronically Signed: Demetrio Hall MD  2/13/2025 7:31 AM EST  Workstation ID: [id number]    XR Chest 1 View    Result Date: 2/13/2025  Impression: No acute cardiopulmonary process. Electronically Signed: Beth Munoz MD  2/13/2025 6:52 AM EST  Workstation ID: [id number]     I reviewed the patient's new clinical results.    ________________________________________________________     PROBLEM LIST:    Influenza A    Acute ischemic VBA thalamic stroke, left            ASSESSMENT/PLAN:  1. Acute ischemic stroke involving the left thalamus. Likely small vessel disease second to age and other risk factors including HTN, HLD, DM, and acute infection with dehydration/RAÚL. LKW is unclear.   - MRI brain: Acute infarct involving anterior left thalamus. Encephalomalacia involving right posterior temporal lobe related to sequela of remote infarct. Generalized cerebral atrophy with mild white matter findings of chronic microvascular disease.  - MRA head and neck w/o contrast: Pending   - Echo: Pending   - EKG: Sinus bradycardia. Nonspecific  intraventricular conduction delay. No previous ECG available for comparison  - Labs: A1C: P, B12: P, LDL: P, TSH: 4.61, ammonia: 24, P2y12: P  - Antithrombotics: Continue ASA and Plavix for now. Of note, pt did miss a few doses of his medications due to feeling poorly. Will check P2Y12 tomorrow. If poor responder to Plavix, will change to Brilinta and ASA.  - Statin: Lipitor 80mg qhs   - PT/OT/ST as appropriate, fall precautions as appropriate, Neuro checks per protocol, DVT prophylaxis, Stroke education    2. Influenza A  - Unvaccinated  - CXR with no acute findings, no concern for superimposed bacterial pneumonia  - Not on supplemental oxygen  - Symptomatic management per primary      3. Acute kidney injury  - Creatinine 2.23, BUN 38, previous baseline 0.8-0.9  - Nephrology consulted      4. CAD status post CABG,  Nonischemic cardiomyopathy  - ASA and Plavix  - Per primary       5. Hypertension, acute hypotension   - Strict BP control, monitor per protocol    6. Hyperlipidemia  - Statin, recommend diet and lifestyle modifications    7. Type 2 diabetes mellitus  - Strict glycemic control, SSI per primary    Modification of stroke risk factors:   - Blood pressure should be less than 130/80 outpatient, HbA1c less than 6.5, LDL less than 70; b12>500 and smoking cessation if applicable. We would be grateful if the primary team / primary care physician would keep a close watch on the above targets.  - Stroke education  - Follow up with neurologist of choice      I discussed the patient's findings and my recommendations with patient, family, nursing staff, primary care team, and consulting provider    Maira Aguilar, GINO  02/14/25  12:34 EST

## 2025-02-14 NOTE — PROGRESS NOTES
[facility]:    Consult received, full note to follow.    Demario Manley M.D.  [facility]

## 2025-02-14 NOTE — PLAN OF CARE
Goal Outcome Evaluation:      Pt was seen for skilled ST targeting cognitive-linguistic assessment at bedside. Pt awake and alert upon entry with wife present. Pt is [place] and does not have hearing aids present. Agreeable to tx and pleasant. Oral mech examination revealed edentulous state with upper and lower dentures in place. Generalized weakness of oral musculature. Motor speech and voice WFL. Pt does not endorse cognitive-linguistic deficits; although, wife reports that she has noticed some general difficulties. SLP completed informal cognitive-linguistic assessment and noted the following: Auditory/reading comprehension; verbal/graphic expression; attention; thought organization; executive function; pragmatics all fell WFL range. Orientation with mild deficits; reasoning with mild-mod deficits; memory, problem solving, right hemisphere function all presented within mod-severe range. Pt with overall moderate cognitive deficits.      Based on the assessment results, pt presents with a moderate cognitive-linguistic disorder. ST will continue to follow to conduct appropriate speech/language and cognitive therapy.             Anticipated Discharge Disposition (SLP): unknown    SLP Diagnosis: moderate, cognitive-linguistic disorder (02/14/25 1500)

## 2025-02-14 NOTE — THERAPY EVALUATION
Acute Care - Speech Language Pathology Initial Evaluation   Sánchez     Patient Name: Luiz Connors  : 1950  MRN: [id number]  Today's Date: 2025               Admit Date: 2025     Visit Dx:    ICD-10-CM ICD-9-CM   1. Altered mental status, unspecified altered mental status type  R41.82 780.97   2. Influenza A  J10.1 487.1   3. RAÚL (acute kidney injury)  N17.9 584.9     Patient Active Problem List   Diagnosis    Influenza A    Acute ischemic VBA thalamic stroke, left    Acute ischemic stroke     History reviewed. No pertinent past medical history.  History reviewed. No pertinent surgical history.    SLP Recommendation and Plan  SLP Diagnosis: moderate, cognitive-linguistic disorder (25)              [place] Criteria for Skilled Therapy Interventions Met: yes (25)  Anticipated Discharge Disposition (SLP): unknown (25)  Demonstrates Need for Referral to Another Service: physical therapy, occupational therapy (25)     Therapy Frequency (SLP SLC): 3 days per week, 4 days per week (25)  Predicted Duration Therapy Intervention (Days): until discharge (25)                                    SLP EVALUATION (Last 72 Hours)       SLP SLC Evaluation       Row Name 25       Communication Assessment/Intervention    Document Type evaluation  -MB    Subjective Information no complaints  -MB    Patient Observations alert;cooperative;agree to therapy  -MB    Patient/Family/Caregiver Comments/Observations Wife present, assisted with providing details of perceived deficits.  -MB    Patient Effort good  -MB    Comment Pt was seen for skilled ST targeting cognitive-linguistic assessment at bedside. Pt awake and alert upon entry with wife present. Pt is [place] and does not have hearing aids present. Agreeable to tx and pleasant. Oral [facility] examination revealed edentulous state with upper and lower dentures in place. Generalized weakness of oral  "musculature. Motor speech and voice WFL. Pt does not endorse cognitive-linguistic deficits; although, wife reports that she has noticed some general difficulties. SLP completed informal cognitive-linguistic assessment and noted the following: Auditory/reading comprehension; verbal/graphic expression; attention; thought organization; executive function; pragmatics all fell WFL range. Orientation with mild deficits; reasoning with mild-mod deficits; memory, problem solving, right hemisphere function all presented within mod-severe range. Pt with overall moderate cognitive deficits.      Based on the assessment results, pt presents with a moderate cognitive-linguistic disorder. ST will continue to follow to conduct appropriate speech/language and cognitive therapy.   -MB       General Information    Patient Profile Reviewed yes  -MB    Pertinent History Of Current Problem Per H&P: \"Luiz Connors is a 74 y.o. male with a CMH of CAD with CABG, nonischemic cardiomyopathy,, HLD, HTN, diabetes mellitus who presented to [facility] on 2/13/2025 with flulike symptoms.  Patient endorses symptoms of fatigue cough and congestion for the past 2 days.  There are reports of patient going to work today in which he started develop confusion as well as possible right facial droop and difficulty speaking.  However per EMS reports, symptoms not present on their evaluation.  Patient currently denies headache, vision changes, speech changes, unilateral weakness/paresthesias.  Denies history of CVA.  Patient reports being unvaccinated for influenza this year.\" ST consulted for evaluation of cognitive communication.  -MB    Precautions/Limitations, Vision WFL;for purposes of eval  -MB    Precautions/Limitations, Hearing hearing impairment, bilaterally  -MB    Prior Level of Function-Communication unknown  -MB    Plans/Goals Discussed with patient and family  -MB    Barriers to Rehab hearing deficit  -MB       Comprehension " Assessment/Intervention    Comprehension Assessment/Intervention Auditory Comprehension;Reading Comprehension  -MB       Auditory Comprehension Assessment/Intervention    Auditory Comprehension (Communication) WFL  -MB       Reading Comprehension Assessment/Intervention    Reading Comprehension (Communication) WFL  -MB       Expression Assessment/Intervention    Expression Assessment/Intervention verbal expression;graphic expression  -MB       Verbal Expression Assessment/Intervention    Verbal Expression WFL  -MB       Graphic Expression Assessment/Intervention    Graphic Expression WFL  -MB       Oral Motor Structure and Function    Oral Motor Structure and Function WFL  -MB    Dentition Assessment edentulous;upper dentures/partial in place;lower dentures/partial in place  -MB    Mucosal Quality moist, healthy  -MB       Oral Musculature and Cranial Nerve Assessment    Oral Motor General Assessment generalized oral motor weakness  -MB       Motor Speech Assessment/Intervention    Motor Speech Function WFL  -MB       Cursory Voice Assessment/Intervention    Quality and Resonance (Voice) WFL  -MB       Cognitive Assessment Intervention- SLP    Cognitive Function (Cognition) moderate impairment  -MB    Orientation Status (Cognition) mild impairment  -MB    Memory (Cognitive) moderate impairment;severe impairment  -MB    Attention (Cognitive) WFL  -MB    Thought Organization (Cognitive) WFL  -MB    Reasoning (Cognitive) mild impairment;moderate impairment  -MB    Problem Solving (Cognitive) moderate impairment;severe impairment  -MB    Executive Function (Cognition) WFL  -MB    Pragmatics (Communication) WFL  -MB    Right Hemisphere Function moderate impairment;severe impairment  -MB       SLP Evaluation Clinical Impressions    SLP Diagnosis moderate;cognitive-linguistic disorder  -MB    Rehab Potential/Prognosis good  -MB    SLC Criteria for Skilled Therapy Interventions Met yes  -MB    Functional Impact functional  impact in ADLs;unable to make medical decisions;needs occasional supervision;need frequent supervision;difficulty communicating in an emergency;difficulty in expressing complex messages;restrictions in personal and social life;decreased ability to respond to situations safely;difficulty completing home management task;difficulty completing vocational tasks  -MB       Recommendations    Therapy Frequency (SLP SLC) 3 days per week;4 days per week  -MB    Predicted Duration Therapy Intervention (Days) until discharge  -MB    Anticipated Discharge Disposition (SLP) unknown  -MB    Demonstrates Need for Referral to Another Service physical therapy;occupational therapy  -MB       Communication Treatment Objective and Progress Goals (SLP)    SLC LTGs Patient will demonstrate functional cognitive-linguistic skills for return to discharge environment  -MB    Cognitive Linguistic Treatment Objectives Cognitive Linguistic Treatment Objectives (Group)  -MB       Patient will demonstrate functional cognitive-linguistic skills for return to discharge environment    [place] with moderate cues  -MB    Time frame by discharge  -MB    Barriers cognitive status  -MB    Progress/Outcomes new goal  -MB       Cognitive Linguistic Treatment Objectives    Memory Skills Selection memory skills, SLP goal 1  -MB    Problem Solving Selection problem solving, SLP goal 1  -MB    Right Hemisphere Function Selection right hemisphere function, SLP goal 1  -MB       Memory Skills Goal 1 (SLP)    Improve Memory Skills Through Goal 1 (SLP) recalling related word lists with an imposed delay;recalling unrelated word lists immediately;recalling unrelated word lists with an imposed delay;recall details from a word list;visual memory task;listen to a paragraph and answer questions;read a paragraph and answer questions;use external memory aid;use written schedule;use memory strategies;recall details of the day  -MB    Time Frame (Memory Skills Goal 1,  "SLP) by discharge  -MB    Progress (Memory Skills Goal 1, SLP) with moderate cues (50-74%)  -MB    Progress/Outcomes (Memory Skills Goal 1, SLP) new goal  -MB       Functional Problem Solving Skills Goal 1 (SLP)    Improve Problem Solving Through Goal 1 (SLP) determine solutions to complex problems;answer \"what if\" questions;sequence steps in a task;complete organization/home management task;name items for a task;determine solutions to simple ADL/safety problems  -MB    Time Frame (Problem Solving Goal 1, SLP) by discharge  -MB    Progress (Problem Solving Goal 1, SLP) with moderate cues (50-74%)  -MB    Progress/Outcomes (Problem Solving Goal 1, SLP) new goal  -MB       Right Hemisphere Function Goal 1 (SLP)    Improve Right Hemisphere Function Through Goal 1 (SLP) complete visuo-spatial activities (visual closure, trail making, mazes  -MB    Time Frame (Right Hemisphere Function Goal 1, SLP) by discharge  -MB    Progress (Right Hemisphere Function Goal 1, SLP) with moderate cues (50-74%)  -MB    Progress/Outcomes (Right Hemisphere Function Goal 1, SLP) new goal  -MB              User Key  (r) = Recorded By, (t) = Taken By, (c) = Cosigned By      Initials Name Effective Dates    MB Van Walter, SLP 04/30/24 -                        EDUCATION  The patient has been educated in the following areas:     Cognitive Impairment Communication Impairment.           SLP GOALS       Row Name 02/14/25 1500             Patient will demonstrate functional cognitive-linguistic skills for return to discharge environment    [place] with moderate cues  -MB      Time frame by discharge  -MB      Barriers cognitive status  -MB      Progress/Outcomes new goal  -MB         Memory Skills Goal 1 (SLP)    Improve Memory Skills Through Goal 1 (SLP) recalling related word lists with an imposed delay;recalling unrelated word lists immediately;recalling unrelated word lists with an imposed delay;recall details from a word list;visual " "memory task;listen to a paragraph and answer questions;read a paragraph and answer questions;use external memory aid;use written schedule;use memory strategies;recall details of the day  -MB      Time Frame (Memory Skills Goal 1, SLP) by discharge  -MB      Progress (Memory Skills Goal 1, SLP) with moderate cues (50-74%)  -MB      Progress/Outcomes (Memory Skills Goal 1, SLP) new goal  -MB         Functional Problem Solving Skills Goal 1 (SLP)    Improve Problem Solving Through Goal 1 (SLP) determine solutions to complex problems;answer \"what if\" questions;sequence steps in a task;complete organization/home management task;name items for a task;determine solutions to simple ADL/safety problems  -MB      Time Frame (Problem Solving Goal 1, SLP) by discharge  -MB      Progress (Problem Solving Goal 1, SLP) with moderate cues (50-74%)  -MB      Progress/Outcomes (Problem Solving Goal 1, SLP) new goal  -MB         Right Hemisphere Function Goal 1 (SLP)    Improve Right Hemisphere Function Through Goal 1 (SLP) complete visuo-spatial activities (visual closure, trail making, mazes  -MB      Time Frame (Right Hemisphere Function Goal 1, SLP) by discharge  -MB      Progress (Right Hemisphere Function Goal 1, SLP) with moderate cues (50-74%)  -MB      Progress/Outcomes (Right Hemisphere Function Goal 1, SLP) new goal  -MB                User Key  (r) = Recorded By, (t) = Taken By, (c) = Cosigned By      Initials Name Provider Type    Van Duarte SLP Speech and Language Pathologist                              Time Calculation:                        ANTONI Chandler  2/14/2025  "

## 2025-02-14 NOTE — CONSULTS
"Diabetes Education  Assessment/Teaching    Patient Name:  Luiz Connors  YOB: 1950  MRN: [id number]  Admit Date:  2/13/2025      Assessment Date:  2/14/2025  Flowsheet Row Most Recent Value   General Information     Referral From: Blood glucose, MD order  [MD consult per stroke protocol and admission blood sugar 248.]   Height 170.2 cm (67.01\")   Height Method Stated   Weight 83.5 kg (184 lb 1.4 oz)   Weight Method Bed scale   Pregnancy Assessment    Diabetes History    What type of diabetes do you have? Type 2   Length of Diabetes Diagnosis 6 - 10 years  [Diagnosed around 2017]   Current DM knowledge fair   Do you test your blood sugar at home? yes   Frequency of checks daily at various times   Meter type ReliOn about 5 year old   Who performs the test? patient   Typical readings 100   Have you had high blood sugar? (>140mg/dl) yes   How often do you have high blood sugar? unknown   When was your last high blood sugar? Admission blood sugar 248   Feeling down, depressed, or hopeless Not at all   Little interest or pleasure in doing things Not at all   Education Preferences    What areas of diabetes would you like to learn about? avoiding high blood sugar, diabetes complications, testing my blood sugar at home   Nutrition Information    Assessment Topics    Problem Solving - Assessment Needs education   Reducing Risk - Assessment Needs education   Monitoring - Assessment Needs education   DM Goals    Problem Solving - Goal Today   Reducing Risk - Goal Today   Monitoring - Goal Today            Flowsheet Row Most Recent Value   DM Education Needs    Meter Needs meter   Meter Type Other (comment)  [Explained to patient that it is recommended to replace meter every 4 years. Patient stated he could get a new meter.]   Frequency of Testing Daily   Medication Oral  [At home patient stated that he takes Metformin  mg BID and Glipizide 5 mg 1/2 tab every morning.]   Problem Solving Hyperglycemia, " Signs, Symptoms, Treatment   Reducing Risks A1C testing  [A1c ordered but not resulted.]   Discharge Plan Home   Motivation Moderate   Teaching Method Discussion   Patient Response Verbalized understanding              Other Comments:  Discussed admission blood sugar 248 and patient stated he didn't have any meds prior to coming to hospital. Patient stated he had a lot of test strips at home. Explained to patient that test strips have expiration dates and to check the dates when he gets home. Instructed patient to throw away any  test strips. Patient has no further questions or concerns related to diabetes at this time.        Electronically signed by:  Saray Marrero RN  25 16:50 EST

## 2025-02-14 NOTE — THERAPY EVALUATION
Patient Name: Luiz Connors  : 1950    MRN: [id number]                              Today's Date: 2025       Admit Date: 2025    Visit Dx:     ICD-10-CM ICD-9-CM   1. Altered mental status, unspecified altered mental status type  R41.82 780.97   2. Influenza A  J10.1 487.1   3. RAÚL (acute kidney injury)  N17.9 584.9     Patient Active Problem List   Diagnosis    Influenza A    Acute ischemic VBA thalamic stroke, left    Acute ischemic stroke     History reviewed. No pertinent past medical history.  History reviewed. No pertinent surgical history.   General Information       Row Name 25 1534          Physical Therapy Time and Intention    Document Type evaluation  -OD     Mode of Treatment physical therapy  -OD       Row Name 25 1534          General Information    Patient Profile Reviewed yes  -OD     Prior Level of Function independent:;ADL's;driving;all household mobility;work  -OD     Existing Precautions/Restrictions no known precautions/restrictions  -OD     Barriers to Rehab cognitive status  -OD       Row Name 25 1534          Living Environment    People in Home spouse  -OD       Row Name 25 1534          Home Main Entrance    Number of Stairs, Main Entrance three  -OD       Row Name 25 1534          Stairs Within Home, Primary    Number of Stairs, Within Home, Primary none  -OD       Row Name 25 1534          Cognition    Orientation Status (Cognition) oriented x 3;disoriented to;situation  -OD       Row Name 25 1534          Safety Issues/Impairments Affecting Functional Mobility    Impairments Affecting Function (Mobility) cognition;balance  -OD     Cognitive Impairments, Mobility Safety/Performance other (see comments)  short-term memory, executive function  -OD               User Key  (r) = Recorded By, (t) = Taken By, (c) = Cosigned By      Initials Name Provider Type    OD Cristal Lozano PT Physical Therapist                   Mobility        Row Name 02/14/25 1535          Bed Mobility    Bed Mobility bed mobility (all) activities  -OD     All Activities, [place] (Bed Mobility) contact guard  -OD       Row Name 02/14/25 1535          Bed-Chair Transfer    Bed-Chair [place] (Transfers) standby assist  -OD       Row Name 02/14/25 1535          Sit-Stand Transfer    Sit-Stand [place] (Transfers) standby assist  -OD       Row Name 02/14/25 1535          Gait/Stairs (Locomotion)    [place] Level (Gait) contact guard  -OD     Patient was able to Ambulate yes  -OD     Distance in Feet (Gait) 200  -OD               User Key  (r) = Recorded By, (t) = Taken By, (c) = Cosigned By      Initials Name Provider Type    OD Cristal Lozano PT Physical Therapist                   Obj/Interventions       Row Name 02/14/25 1535          Range of Motion Comprehensive    General Range of Motion lower extremity range of motion deficits identified  -OD     Comment, General Range of Motion Prior L ankle fusion  -OD       Row Name 02/14/25 1535          Strength Comprehensive (MMT)    General Manual Muscle Testing (MMT) Assessment no strength deficits identified  -OD     Comment, General Manual Muscle Testing (MMT) Assessment Good, intact strength 5/5 BLE  -OD       Row Name 02/14/25 1535          Motor Skills    Motor Skills coordination;functional endurance;neuro-muscular function  -OD     Coordination WNL  -OD     Functional Endurance good  -OD     Neuromuscular Function other (see comments)  observed mild atrophy LLE, likely present prior  -OD       Row Name 02/14/25 1535          Balance    Balance Assessment standing dynamic balance  -OD     Dynamic Standing Balance contact guard;minimal assist;other (see comments)  r/t prior L ankle fusion  -OD     Balance Interventions sitting;standing;minimal challenge  -OD       Row Name 02/14/25 1535          Sensory Assessment (Somatosensory)    Sensory Assessment (Somatosensory) sensation intact  -OD                User Key  (r) = Recorded By, (t) = Taken By, (c) = Cosigned By      Initials Name Provider Type    OD Cristal Lozano, PT Physical Therapist                   Goals/Plan    No documentation.                  Clinical Impression       Row Name 02/14/25 1541          Pain    Pretreatment Pain Rating 0/10 - no pain  -OD     Posttreatment Pain Rating 0/10 - no pain  -OD       Row Name 02/14/25 1541          Plan of Care Review    Plan of Care Reviewed With patient;spouse  -OD     Progress improving  -OD     Outcome Evaluation Luiz Connors is a 75 y/o M admitted to [facility] on 2/13/25 for confusion, R facial droop, difficulty speaking. RVP (+) flu A. MRI brain (+) acute L anterior thalamic infarct. At baseline, pt lives with his spouse in [facility] with [facility], and is a highly active individual, still working part-time. This date, pt is AAOx3, disoriented to situation, and demonstrates deficits mostly related to his cognition and executive functioning. Strength, sensation, coordination, oculomotor exam all WNL. Balance mildly impaired on L-side, but that is related to prior L ankle fusion. Ambulated x 200 ft with CGA-SBA. Pt appears at baseline function, but would benefit from Speech Therapy, as he remains to have difficulty with cognition. Will complete orders at this time.  -OD       Row Name 02/14/25 1541          Therapy Assessment/Plan (PT)    Criteria for Skilled Interventions Met (PT) no;does not meet criteria for skilled intervention  -OD     Therapy Frequency (PT) evaluation only  -OD       Row Name 02/14/25 1541          Vital Signs    O2 Delivery Pre Treatment room air  -OD     O2 Delivery Intra Treatment room air  -OD     O2 Delivery Post Treatment room air  -OD     Pre Patient Position Supine  -OD     Intra Patient Position Standing  -OD     Post Patient Position Sitting  -OD       Row Name 02/14/25 1541          Positioning and Restraints    Pre-Treatment Position in bed  -OD     Post Treatment Position chair   -OD     In Chair notified nsg;reclined;call light within reach;encouraged to call for assist;with family/caregiver  -OD               User Key  (r) = Recorded By, (t) = Taken By, (c) = Cosigned By      Initials Name Provider Type    Cristal Green, PT Physical Therapist                   Outcome Measures       Row Name 02/14/25 1546 02/14/25 1158       How much help from another person do you currently need...    Turning from your back to your side while in flat bed without using bedrails? 4  -OD 4  -ME    Moving from lying on back to sitting on the side of a flat bed without bedrails? 4  -OD 4  -ME    Moving to and from a bed to a chair (including a wheelchair)? 4  -OD 4  -ME    Standing up from a chair using your arms (e.g., wheelchair, bedside chair)? 4  -OD 4  -ME    Climbing 3-5 steps with a railing? 4  -OD 4  -ME    To walk in hospital room? 4  -OD 4  -ME    AM-PAC 6 Clicks Score (PT) 24  -OD 24  -ME    Highest Level of Mobility Goal 8 --> Walked 250 feet or more  -OD 8 --> Walked 250 feet or more  -ME      Row Name 02/14/25 0933 02/14/25 0400       How much help from another person do you currently need...    Turning from your back to your side while in flat bed without using bedrails? 4  -ME 4  -KS (r)  AR (c)    Moving from lying on back to sitting on the side of a flat bed without bedrails? 4  -ME 4  -KS (r)  AR (c)    Moving to and from a bed to a chair (including a wheelchair)? 4  -ME 4  -KS (r)  AR (c)    Standing up from a chair using your arms (e.g., wheelchair, bedside chair)? 4  -ME 4  -KS (r)  AR (c)    Climbing 3-5 steps with a railing? 4  -ME 4  -KS (r)  AR (c)    To walk in hospital room? 4  -ME 4  -KS (r)  AR (c)    AM-PAC 6 Clicks Score (PT) 24  -ME 24  -KS    Highest Level of Mobility Goal 8 --> Walked 250 feet or more  -ME 8 --> Walked 250 feet or more  -KS      Row Name 02/14/25 1546          Functional Assessment    Outcome Measure Options AM-PAC 6 Clicks Basic Mobility (PT)  -OD                User Key  (r) = Recorded By, (t) = Taken By, (c) = Cosigned By      Initials Name Provider Type    Keli Raines, RN Registered Nurse    Cristal Green, [facility] Physical Therapist    Annia Gould, RN Registered Nurse    Ganga Valentin, RNA Registered Nurse                                 Physical Therapy Education       Title: PT OT SLP Therapies (Done)       Topic: Physical Therapy (Done)       Point: Mobility training (Done)       Learning Progress Summary            Patient Acceptance, E, VU by OD at 2/14/2025 1547                      Point: Home exercise program (Done)       Learning Progress Summary            Patient Acceptance, E, VU by OD at 2/14/2025 1547                      Point: Body mechanics (Done)       Learning Progress Summary            Patient Acceptance, E, VU by OD at 2/14/2025 1547                      Point: Precautions (Done)       Learning Progress Summary            Patient Acceptance, E, VU by OD at 2/14/2025 1547                                      User Key       Initials Effective Dates Name Provider Type Discipline    OD 05/11/23 -  Cristal Lozano, [facility] Physical Therapist PT                  PT Recommendation and Plan     Progress: improving  Outcome Evaluation: Luiz Connors is a 75 y/o M admitted to [facility] on 2/13/25 for confusion, R facial droop, difficulty speaking. RVP (+) flu A. MRI brain (+) acute L anterior thalamic infarct. At baseline, pt lives with his spouse in [facility] with [facility], and is a highly active individual, still working part-time. This date, pt is AAOx3, disoriented to situation, and demonstrates deficits mostly related to his cognition and executive functioning. Strength, sensation, coordination, oculomotor exam all WNL. Balance mildly impaired on L-side, but that is related to prior L ankle fusion. Ambulated x 200 ft with CGA-SBA. Pt appears at baseline function, but would benefit from Speech Therapy, as he remains to have difficulty with  cognition. Will complete orders at this time.     Time Calculation:         PT Charges       Row Name 02/14/25 1547             Time Calculation    Start Time 1514  -OD      Stop Time 1530  -OD      Time Calculation (min) 16 min  -OD      PT Received On 02/14/25  -OD         Time Calculation- PT    Total Timed Code Minutes- PT 0 minute(s)  -OD                User Key  (r) = Recorded By, (t) = Taken By, (c) = Cosigned By      Initials Name Provider Type    OD Cristal Lozano, PT Physical Therapist                  Therapy Charges for Today       Code Description Service Date Service Provider Modifiers Qty    [id number] HC PT EVAL LOW COMPLEXITY 3 2/14/2025 Cristal Lozano, PT GP 1            PT G-Codes  Outcome Measure Options: AM-PAC 6 Clicks Basic Mobility (PT)  AM-PAC 6 Clicks Score (PT): 24  PT Discharge Summary  Anticipated Discharge Disposition (PT): home with assist    Cristal Lozano, PT  2/14/2025

## 2025-02-14 NOTE — PAYOR COMM NOTE
"Claudio Connors (74 y.o. Male)  1950  Policy no. [id number]   Requesting IP Auth for ER medical Admission  Admit OBS status . IP status     AUTHORIZATION PENDING  PLEASE FORWARD DETERMINATION TO FOLLOWING CONTACT:    LUCI BLUNT LPN UR  Utilization Review Nurse  [facility]  Direct & confidential phone # [phone]  Fax # [phone]      Date of Birth   1950    Social Security Number       Address   [address] [place] [address]    Home Phone   [phone]    MRN   [id number]       Spiritism   None    Marital Status                               Admission Date   25    Admission Type   Emergency    Admitting Provider   Sav Ambrosio MD    Attending Provider   Sav Ambrosio MD    Department, Room/Bed   [facility] NEURO, 245/       Discharge Date       Discharge Disposition       Discharge Destination                                 Attending Provider: Sav Ambrosio MD    Allergies: No Known Allergies    Isolation: Droplet   Infection: Influenza (25)   Code Status: CPR    Ht: 170.2 cm (67.01\")   Wt: 83.5 kg (184 lb 1.4 oz)    Admission Cmt: None   Principal Problem: Influenza A [J10.1]                   Active Insurance as of 2025       Primary Coverage       Payor Plan Insurance Group Employer/Plan Group    ANTHEM MEDICARE REPLACEMENT ANTHEM MED ADV HMO [id number]       Payor Plan Address Payor Plan Phone Number Payor Plan Fax Number Effective Dates    [address] [phone]  2025 - None Entered    [facility] [id number]         Subscriber Name Subscriber Birth Date Member ID       CLAUDIO CONNORS 1950 [id number]                     Emergency Contacts        (Rel.) Home Phone Work Phone Mobile Phone    ELOISA CONNORS (Spouse) -- -- [phone]                 History & Physical        Roderick Reyna PA-C at 25 0812       Attestation signed by Onesimo Orlando MD " at 25 1116    I have reviewed this documentation and agree.                      [facility] Medicine Services  History & Physical    Patient Name: Luiz Connors  : 1950  MRN: [id number]  Primary Care Physician:  Joie Issa APRN  Date of admission: 2025  Date and Time of Service: 2025 at 0812    Subjective      Chief Complaint: Flu-like symptoms    History of Present Illness: Luiz Connors is a 74 y.o. male with a CMH of CAD with CABG, nonischemic cardiomyopathy,, HLD, HTN, diabetes mellitus who presented to [facility] on 2025 with flulike symptoms.  Patient endorses symptoms of fatigue cough and congestion for the past 2 days.  There are reports of patient going to work today in which he started develop confusion as well as possible right facial droop and difficulty speaking.  However per EMS reports, symptoms not present on their evaluation.  Patient currently denies headache, vision changes, speech changes, unilateral weakness/paresthesias.  Denies history of CVA.  Patient reports being unvaccinated for influenza this year.    On ED evaluation, patient with no focal deficits or aphasia.  Patient was noted to be hypotensive on arrival with blood pressure of 88/66.  Patient was given 1 L IV fluids with improvement of blood pressure.  Labs are pertinent for creatinine 2.23, BUN 38, glucose 284. CBC was unremarkable.  Procalcitonin was noted to be 0.29.  Ammonia was 24.  UA not concerning for UTI.  Respiratory panel was positive for influenza A.  Chest x-ray with no acute findings.  CT head with remote right posterior infarct as well as C3 screw that appears to be fractured otherwise no acute findings.  Hospital service to admit for further evaluation and management.      Review of Systems   Constitutional:  Positive for fatigue. Negative for chills and fever.   HENT:  Positive for congestion.    Respiratory:  Positive for cough.    Cardiovascular:  Negative for  chest pain.   Gastrointestinal:  Negative for abdominal pain, nausea and vomiting.       Personal History     No past medical history on file.    No past surgical history on file.    Family History: family history is not on file. Otherwise pertinent FHx was reviewed and not pertinent to current issue.    Social History:      Home Medications:  Prior to Admission Medications       None              Allergies:  No Known Allergies    Objective      Vitals:   Temp:  [97.6 °F (36.4 °C)] 97.6 °F (36.4 °C)  Heart Rate:  [53-73] 68  Resp:  [17-20] 17  BP: ()/(41-83) 119/55  Body mass index is 28.19 kg/m².    Physical Exam  General: 73 yo WM, Alert and oriented to self and place, well nourished, no acute distress.  HENT: Normocephalic, normal hearing, moist oral mucosa, no scleral icterus.  Neck: Supple, nontender, no carotid bruits, no JVD, no LAD.  Lungs: Clear to auscultation, nonlabored respiration.  Heart: RRR, no murmur, gallop or edema.  Abdomen: Soft, nontender, nondistended, + bowel sounds.  Musculoskeletal: Normal range of motion and strength, no tenderness or swelling.  Skin: Skin is warm, dry and pink, no rashes or lesions.  Psychiatric: Cooperative, appropriate mood and affect.  Neurologic: Equal strength bilaterally. No focal motor or sensory deficits appreciated. No facial droop or aphasia present.        Diagnostic Data:  Lab Results (last 24 hours)       Procedure Component Value Units Date/Time    Urinalysis, Microscopic Only - Urine, Clean Catch [[id number]]  (Abnormal) Collected: 02/13/25 0806    Specimen: Urine, Clean Catch Updated: 02/13/25 0839     RBC, UA 0-2 /HPF      WBC, UA 0-2 /HPF      Comment: Urine culture not indicated.        Bacteria, UA 2+ /HPF      Squamous Epithelial Cells, UA 3-6 /HPF      Hyaline Casts, UA 3-6 /LPF      Methodology Manual Light Microscopy    Urinalysis With Culture If Indicated - Urine, Clean Catch [[id number]]  (Abnormal) Collected: 02/13/25 0806    Specimen:  Urine, Clean Catch Updated: 02/13/25 0816     Color, UA Dark Yellow     Appearance, UA Slightly Cloudy     Comment: Result checked          pH, UA <=5.0     Specific Gravity, UA 1.026     Glucose, UA Negative     Ketones, UA Trace     Bilirubin, UA Negative     Blood, UA Negative     Protein,  mg/dL (2+)     Leuk Esterase, UA Negative     Nitrite, UA Negative     Urobilinogen, UA 1.0 E.U./dL    Narrative:      In absence of clinical symptoms, the presence of pyuria, bacteria, and/or nitrites on the urinalysis result does not correlate with infection.    Respiratory Panel PCR w/COVID-19(SARS-CoV-2) PRAB[facility]/NANDO/ONELIA/PAD/COR/TREVOR In-House, NP Swab in UTM/VTM, 2 HR TAT - Swab, Nasopharynx [[id number]]  (Abnormal) Collected: 02/13/25 0649    Specimen: Swab from Nasopharynx Updated: 02/13/25 0747     ADENOVIRUS, PCR Not Detected     Coronavirus 229E Not Detected     Coronavirus HKU1 Not Detected     Coronavirus NL63 Not Detected     Coronavirus OC43 Not Detected     COVID19 Not Detected     Human Metapneumovirus Not Detected     Human Rhinovirus/Enterovirus Not Detected     Influenza A H1 2009 PCR Detected     Influenza B PCR Not Detected     Parainfluenza Virus 1 Not Detected     Parainfluenza Virus 2 Not Detected     Parainfluenza Virus 3 Not Detected     Parainfluenza Virus 4 Not Detected     RSV, PCR Not Detected     Bordetella pertussis pcr Not Detected     Bordetella parapertussis PCR Not Detected     Chlamydophila pneumoniae PCR Not Detected     Mycoplasma pneumo by PCR Not Detected    Narrative:      In the setting of a positive respiratory panel with a viral infection PLUS a negative procalcitonin without other underlying concern for bacterial infection, consider observing off antibiotics or discontinuation of antibiotics and continue supportive care. If the respiratory panel is positive for atypical bacterial infection (Bordetella pertussis, Chlamydophila pneumoniae, or Mycoplasma pneumoniae), consider antibiotic  "de-escalation to target atypical bacterial infection.    Procalcitonin [[id number]]  (Abnormal) Collected: 02/13/25 0648    Specimen: Blood Updated: 02/13/25 0735     Procalcitonin 0.29 ng/mL     Narrative:      As a Marker for Sepsis (Non-Neonates):    1. <0.5 ng/mL represents a low risk of severe sepsis and/or septic shock.  2. >2 ng/mL represents a high risk of severe sepsis and/or septic shock.    As a Marker for Lower Respiratory Tract Infections that require antibiotic therapy:    PCT on Admission    Antibiotic Therapy       6-12 Hrs later    >0.5                Strongly Recommended  >0.25 - <0.5        Recommended   0.1 - 0.25          Discouraged              Remeasure/reassess PCT  <0.1                Strongly Discouraged     Remeasure/reassess PCT    As 28 day mortality risk marker: \"Change in Procalcitonin Result\" (>80% or <=80%) if Day 0 (or Day 1) and Day 4 values are available. Refer to http://www.[facility]-pct-calculator.com    Change in PCT <=80%  A decrease of PCT levels below or equal to 80% defines a positive change in PCT test result representing a higher risk for 28-day all-cause mortality of patients diagnosed with severe sepsis for septic shock.    Change in PCT >80%  A decrease of PCT levels of more than 80% defines a negative change in PCT result representing a lower risk for 28-day all-cause mortality of patients diagnosed with severe sepsis or septic shock.       Comprehensive Metabolic Panel [[id number]]  (Abnormal) Collected: 02/13/25 0648    Specimen: Blood Updated: 02/13/25 0735     Glucose 284 mg/dL      BUN 38 mg/dL      Creatinine 2.23 mg/dL      Sodium 138 mmol/L      Potassium 4.4 mmol/L      Chloride 99 mmol/L      CO2 22.2 mmol/L      Calcium 9.2 mg/dL      Total Protein 7.9 g/dL      Albumin 4.0 g/dL      ALT (SGPT) 25 U/L      AST (SGOT) 40 U/L      Alkaline Phosphatase 51 U/L      Total Bilirubin 0.3 mg/dL      Globulin 3.9 gm/dL      A/G Ratio 1.0 g/dL      BUN/Creatinine Ratio " 17.0     Anion Gap 16.8 mmol/L      eGFR 30.0 mL/min/1.73     Narrative:      GFR Categories in Chronic Kidney Disease (CKD)      GFR Category          GFR (mL/min/1.73)    Interpretation  G1                     90 or greater         Normal or high (1)  G2                      60-89                Mild decrease (1)  G3a                   45-59                Mild to moderate decrease  G3b                   30-44                Moderate to severe decrease  G4                    15-29                Severe decrease  G5                    14 or less           Kidney failure          (1)In the absence of evidence of kidney disease, neither GFR category G1 or G2 fulfill the criteria for CKD.    eGFR calculation 2021 CKD-EPI creatinine equation, which does not include race as a factor    Ammonia [[id number]]  (Normal) Collected: 02/13/25 0648    Specimen: Blood Updated: 02/13/25 0717     Ammonia 24 umol/L     CBC & Differential [[id number]]  (Abnormal) Collected: 02/13/25 0648    Specimen: Blood Updated: 02/13/25 0700    Narrative:      The following orders were created for panel order CBC & Differential.  Procedure                               Abnormality         Status                     ---------                               -----------         ------                     CBC Auto Differential[[id number]]        Abnormal            Final result                 Please view results for these tests on the individual orders.    CBC Auto Differential [[id number]]  (Abnormal) Collected: 02/13/25 0648    Specimen: Blood Updated: 02/13/25 0700     WBC 8.60 10*3/mm3      RBC 4.76 10*6/mm3      Hemoglobin 13.7 g/dL      Hematocrit 43.9 %      MCV 92.2 fL      MCH 28.8 pg      MCHC 31.2 g/dL      RDW 13.3 %      RDW-SD 45.2 fl      MPV 10.9 fL      Platelets 258 10*3/mm3      Neutrophil % 73.7 %      Lymphocyte % 16.7 %      Monocyte % 8.8 %      Eosinophil % 0.2 %      Basophil % 0.3 %      Immature Grans % 0.3 %      Neutrophils,  Absolute 6.32 10*3/mm3      Lymphocytes, Absolute 1.44 10*3/mm3      Monocytes, Absolute 0.76 10*3/mm3      Eosinophils, Absolute 0.02 10*3/mm3      Basophils, Absolute 0.03 10*3/mm3      Immature Grans, Absolute 0.03 10*3/mm3      nRBC 0.0 /100 WBC     Extra Tubes [[id number]] Collected: 02/13/25 0648    Specimen: Blood, Venous Line Updated: 02/13/25 0700    Narrative:      The following orders were created for panel order Extra Tubes.  Procedure                               Abnormality         Status                     ---------                               -----------         ------                     Gold Top - SST[[id number]]                                   Final result               Light Blue Top[[id number]]                                   Final result                 Please view results for these tests on the individual orders.    Gold Top - SST [[id number]] Collected: 02/13/25 0648    Specimen: Blood Updated: 02/13/25 0700     Extra Tube Hold for add-ons.     Comment: Auto resulted.       Light Blue Top [[id number]] Collected: 02/13/25 0658    Specimen: Blood Updated: 02/13/25 0700     Extra Tube Hold for add-ons.     Comment: Auto resulted       POC Glucose Once [[id number]]  (Abnormal) Collected: 02/13/25 0622    Specimen: Blood Updated: 02/13/25 0624     Glucose 248 mg/dL      Comment: Serial Number: [id number]:  [id number]                Imaging Results (Last 24 Hours)       Procedure Component Value Units Date/Time    XR Spine Cervical 2 or 3 View [[id number]] Collected: 02/13/25 0849     Updated: 02/13/25 0853    Narrative:      XR SPINE CERVICAL 2 OR 3 VW    Date of Exam: 2/13/2025 8:10 AM EST    Indication: eval hardware    Comparison: None available.    Findings:  No acute fracture is identified. The alignment is anatomic. There are changes from C4 corpectomy with cage body graft with anterior cervical discectomy and fusion at C3-C5. One or both of the C3 screws are fractured. There are  moderate discogenic changes   at C5-6 and C6-7. There is moderate scattered facet arthropathy and uncovertebral hypertrophy. The prevertebral soft tissues are unremarkable.      Impression:      Impression:  1.Changes from C4 corpectomy with ACDF at C3-C5. One or both of the C3 screws are fractured.  2.Multilevel degenerative changes as described above.        Electronically Signed: Demario Bettencourt MD    2/13/2025 8:51 AM EST    Workstation ID: [id number]    CT Head Without Contrast [[id number]] Collected: 02/13/25 0726     Updated: 02/13/25 0733    Narrative:      CT HEAD WO CONTRAST    Date of Exam: 2/13/2025 7:12 AM EST    Indication: ams.    Comparison: None available.    Technique: Axial CT images were obtained of the head without contrast administration.  Coronal reconstructions were performed.  Automated exposure control and iterative reconstruction methods were used.      Findings:  There is no intracranial hemorrhage. No mass effect or midline shift. There is an area of encephalomalacia involving the right posterior temporal lobe related to sequela of remote infarct. Mild white matter findings suggesting chronic microvascular   disease. No intraventricular hemorrhage. The posterior fossa is without acute abnormality. Globes are intact and symmetric. Slight rightward deviation of the nasal septum. The mastoid air cells are well-aerated. There is no sinus fluid level. No acute   calvarial fracture. Cervical ACDF hardware noted at the upper cervical spine on the  radiograph with corpectomy cage. C3 screw appears fractured.      Impression:      Impression:  1. No acute intracranial abnormality.  2. Remote right posterior temporal infarct with encephalomalacia.  3. Cervical ACDF hardware partially imaged on  radiograph. C3 screw appears fractured, cervical radiographs may be helpful to better assess.          Electronically Signed: Demetrio Hall MD    2/13/2025 7:31 AM EST    Workstation ID: [id number]     XR Chest 1 View [[id number]] Collected: 02/13/25 0652     Updated: 02/13/25 0655    Narrative:      XR CHEST 1 VW    Date of Exam: 2/13/2025 6:49 AM EST    Indication: cough    Comparison: None available.    Findings:  There are no airspace consolidations. No pleural fluid. No pneumothorax. The pulmonary vasculature appears within normal limits. The cardiac and mediastinal silhouette appear unremarkable. No acute osseous abnormality identified.      Impression:      Impression:  No acute cardiopulmonary process.    Electronically Signed: Beth Munoz MD    2/13/2025 6:52 AM EST    Workstation ID: [id number]              Assessment & Plan        This is a 74 y.o. male with:    Active and Resolved Problems  Active Hospital Problems    Diagnosis  POA    **Influenza A [J10.1]  Yes      Resolved Hospital Problems   No resolved problems to display.       Altered mental status  Oriented to self and place only, baseline A&O x 4  Likely secondary to influenza versus ARF, however cannot exclude CVA given CT head findings and no reports of CVA in the past  MRI brain pending  CTH with remote right posterior temporal infarct with encephalomalacia  Neurochecks, NIHSS  May need neurology consult depending on MRI results  Fall precautions    Influenza A  Unvaccinated  CXR with no acute findings, no concern for superimposed bacterial pneumonia  Not on supplemental oxygen  Symptomatic management    Acute kidney injury  Creatinine 2.23, BUN 38, previous baseline 0.8-0.9  Renal ultrasound pending  IVFs  Avoid nephrotoxic medications, holding losartan  Continue to monitor BMP  Nephrology consulted     CAD status post CABG  Nonischemic cardiomyopathy  Hypertension  Hyperlipidemia  Holding losartan in the setting of RAÚL  Continue amlodipine, Imdur  Continue ASA, Plavix    Type 2 diabetes mellitus  Mod SSI, hold glipizide  Consistent carb diet  Hypoglycemia protocol         VTE Prophylaxis:  No VTE prophylaxis order currently  exists.        The patient desires to be as follows:    CODE STATUS:    Code Status (Patient has no pulse and is not breathing): CPR (Attempt to Resuscitate)  Medical Interventions (Patient has pulse or is breathing): Full Support        Vida Burris, who can be contacted at [phone], is the designated person to make medical decisions on the patient's behalf if He is incapable of doing so. This was clarified with patient and/or next of kin on 2/13/2025 during the course of this H&P.    Admission Status:  I believe this patient meets inpatient status.    Expected Length of Stay: 2 to 3 days    PDMP and Medication Dispenses via Sidebar reviewed and consistent with patient reported medications.    I discussed the patient's findings and my recommendations with patient.      Signature:     This document has been electronically signed by Roderick Reyna PA-C on February 13, 2025 11:53 EST   [facility] Team     Electronically signed by Onesimo Orlando MD at 02/14/25 1116       Imaging Results (Last 24 Hours)       Procedure Component Value Units Date/Time    MRI Brain Without Contrast [[id number]] Collected: 02/14/25 0718     Updated: 02/14/25 0728    Narrative:      MRI BRAIN WO CONTRAST    Date of Exam: 2/14/2025 1:03 AM EST    Indication: Remote infarct on CT head, aphasia.     Comparison: CT head without contrast 2/13/2025    Technique:  Routine multiplanar/multisequence sequence images of the brain were obtained without contrast administration.    Findings:  Focal rounded area of diffusion restriction involving the left anterior thalamus which measures approximately 1 cm consistent with an area of acute ischemia. No additional area of acute infarct identified.    There is encephalomalacia involving the right posterior temporal lobe related to sequela of remote infarct. No evidence of intracranial hemorrhage. No mass effect or midline shift. Generalized cerebral volume loss. The major T2 weighted  intracranial   vascular flow voids are grossly patent. Posterior fossa without acute abnormality. Mild scattered areas of T2 hyperintense subcortical white matter signal intensity compatible with chronic microvascular disease.    Rightward deviation of the nasal septum. No sinus fluid level. The mastoid air cells are well-aerated. The pituitary gland is normal in size. Susceptibility artifact in the upper cervical spine related to ACDF hardware and corpectomy at C3-C5. Globes   intact. No retro-orbital abnormality.      Impression:      Impression:  1. Acute infarct involving anterior left thalamus.  2. Encephalomalacia involving right posterior temporal lobe related to sequela of remote infarct.  3. Generalized cerebral atrophy with mild white matter findings of chronic microvascular disease.    I called findings to CECILLE and discussed with patient's nurse Annia at 7:23 a.m. on 2/14/2025.      Electronically Signed: Demetrio Hall MD    2/14/2025 7:26 AM EST    Workstation ID: [id number]    US Renal Bilateral [[id number]] Collected: 02/13/25 1902     Updated: 02/13/25 1904    Narrative:      US RENAL BILATERAL    Date of Exam: 2/13/2025 6:41 PM EST    Indication: RAÚL.    Comparison: No comparisons available.    Technique: Grayscale and color Doppler ultrasound evaluation of the kidneys and urinary bladder was performed.      Findings:  Right Kidney: Right kidney measures 11.1 cm in long axis. Renal cortical thickness and echogenicity are within normal limits.No suspicious appearing lesions.No hydronephrosis.No sonographically evident nephrolithiasis.    Left Kidney:  Left kidney measures 12.2 cm in long axis. Renal cortical thickness and echogenicity are within normal limits.No suspicious appearing lesions.. There is a simple appearing left renal cyst measuring up to 1.5 cm. No hydronephrosis.No   sonographically evident nephrolithiasis.    Bladder: The bladder appears unremarkable.      Impression:       Impression:  No hydronephrosis or sonographically evident nephrolithiasis.        Electronically Signed: Nestor Elaine    2/13/2025 7:02 PM EST    Workstation ID: [id number]             Physician Progress Notes (last 24 hours)        Demario Manley MD at 02/14/25 0551          [facility]:    Consult received, full note to follow.    Demario Manley M.D.  [facility]      Electronically signed by Demario Manley MD at 02/14/25 0551          Consult Notes (last 24 hours)        Maira Aguilar APRN at 02/14/25 1206        Consult Orders    1. Inpatient Neurology Consult Stroke [[id number]] ordered by Sav Ambrosio MD at 02/14/25 0381                 Primary Care Provider: Provider, No Known     Consult requested by:  Dr. Ambrosoi     Reason for Consultation: Neurological evaluation      History taken from: patient chart RN    Chief complaint: confusion, right facial droop, difficulty speaking     Subjective   SUBJECTIVE:    History of present illness: Background per H&P: Luiz Connors is a 74 y.o. male who  presented to [facility] on 2/13/2025 with flulike symptoms.  Patient endorses symptoms of fatigue cough and congestion for the past 2 days.  There are reports of patient going to work today in which he started develop confusion as well as possible right facial droop and difficulty speaking.  However per EMS reports, symptoms not present on their evaluation.  Patient currently denies headache, vision changes, speech changes, unilateral weakness/paresthesias.  Denies history of CVA.  Patient reports being unvaccinated for influenza this year.     On ED evaluation, patient with no focal deficits or aphasia.  Patient was noted to be hypotensive on arrival with blood pressure of 88/66.  Patient was given 1 L IV fluids with improvement of blood pressure.  Labs are pertinent for creatinine 2.23, BUN 38, glucose 284. CBC was unremarkable.   Procalcitonin was noted to be 0.29.  Ammonia was 24.  UA not concerning for UTI.  Respiratory panel was positive for influenza A.  Chest x-ray with no acute findings.  CT head with remote right posterior infarct as well as C3 screw that appears to be fractured otherwise no acute findings.    - Portions of the above HPI were copied from previous encounters and edited as appropriate. PMH as detailed below.     Pt states he feels better today. Denies any neuro complaints. Pt states he missed a few days of medication, including ASA and Plavix, due to feeling bad.     Review of Systems   Constitutional:  Negative for chills, diaphoresis and fatigue.   Eyes:  Negative for photophobia and visual disturbance.   Neurological:  Negative for dizziness, tremors, seizures, syncope, facial asymmetry, speech difficulty, light-headedness, numbness and headaches.          PATIENT HISTORY:  History reviewed. No pertinent past medical history., History reviewed. No pertinent surgical history., History reviewed. No pertinent family history.,   Social History     Tobacco Use    Smoking status: Former     Types: Cigarettes     Start date: 1975    Smokeless tobacco: Never   Vaping Use    Vaping status: Never Used   Substance Use Topics    Drug use: Never   ,   Prior to Admission medications    Medication Sig Start Date End Date Taking? Authorizing Provider   amLODIPine (NORVASC) 5 MG tablet Take 1 tablet by mouth Daily.   Yes ProviderLeonor MD   aspirin 81 MG EC tablet Take 1 tablet by mouth Daily.   Yes ProviderLeonor MD   clopidogrel (PLAVIX) 75 MG tablet Take 1 tablet by mouth Daily.   Yes Leonor Reyes MD   gemfibrozil (LOPID) 600 MG tablet Take 1 tablet by mouth Daily.   Yes Leonor Reyes MD   glipizide (GLUCOTROL) 5 MG tablet Take 0.5 tablets by mouth Daily.   Yes Leonor Reyes MD   isosorbide mononitrate (IMDUR) 60 MG 24 hr tablet Take 1 tablet by mouth Daily.   Yes Leonor Reyes MD    losartan (COZAAR) 50 MG tablet Take 1 tablet by mouth Daily.   Yes ProviderLeonor MD   metFORMIN ER (GLUCOPHAGE-XR) 500 MG 24 hr tablet Take 1 tablet by mouth 2 (Two) Times a Day.   Yes Leonor Reyes MD   acetaminophen (TYLENOL) 325 MG tablet Take 2 tablets by mouth Every 12 (Twelve) Hours As Needed for Mild Pain.    Provider, MD Leonor    Allergies:  Patient has no known allergies.    Current Facility-Administered Medications   Medication Dose Route Frequency Provider Last Rate Last Admin    acetaminophen (TYLENOL) tablet 650 mg  650 mg Oral Q4H PRN Roderick Reyna PA-C        Or    acetaminophen (TYLENOL) 160 MG/5ML oral solution 650 mg  650 mg Oral Q4H PRN Roderick Reyna PA-C        Or    acetaminophen (TYLENOL) suppository 650 mg  650 mg Rectal Q4H PRN Roderick Reyna PA-C        amLODIPine (NORVASC) tablet 5 mg  5 mg Oral Daily Roderick Reyna PA-C   5 mg at 02/14/25 0936    aspirin EC tablet 81 mg  81 mg Oral Daily Roderick Reyna PA-C   81 mg at 02/14/25 0936    atorvastatin (LIPITOR) tablet 80 mg  80 mg Oral Nightly Sav Ambrosio MD        sennosides-docusate (PERICOLACE) 8.6-50 MG per tablet 2 tablet  2 tablet Oral BID PRN Roderick Reyna PA-C        And    polyethylene glycol (MIRALAX) packet 17 g  17 g Oral Daily PRN Roderick Reyna PA-C        And    bisacodyl (DULCOLAX) EC tablet 5 mg  5 mg Oral Daily PRN Roderick Reyna PA-C        And    bisacodyl (DULCOLAX) suppository 10 mg  10 mg Rectal Daily PRN Roderick Reyna PA-C        Calcium Replacement - Follow Nurse / BPA Driven Protocol   Not Applicable PRN Roderick Reyna PA-C        clopidogrel (PLAVIX) tablet 75 mg  75 mg Oral Daily Roderick Reyna PA-C   75 mg at 02/14/25 0936    dextrose (D50W) (25 g/50 mL) IV injection 25 g  25 g Intravenous Q15 Min PRN Roderick Reyna PA-C        dextrose (GLUTOSE) oral gel 15 g  15 g Oral Q15 Min Roderick Ordonez PA-C        glucagon (GLUCAGEN) injection 1 mg  1 mg  Intramuscular Q15 Min PRN Roderick Reyna PA-C        insulin lispro (HUMALOG/ADMELOG) injection 2-9 Units  2-9 Units Subcutaneous 4x Daily AC & at Bedtime Roderick Reyna PA-C   4 Units at 02/13/25 2148    isosorbide mononitrate (IMDUR) 24 hr tablet 60 mg  60 mg Oral Daily Roderick Reyna PA-C   60 mg at 02/14/25 0936    [Held by provider] losartan (COZAAR) tablet 50 mg  50 mg Oral Daily Roderick Reyna PA-C        Magnesium Standard Dose Replacement - Follow Nurse / BPA Driven Protocol   Not Applicable PRN Roderick Reyna PA-C        melatonin tablet 5 mg  5 mg Oral Nightly PRN Roderick Reyna PA-C        ondansetron ODT (ZOFRAN-ODT) disintegrating tablet 4 mg  4 mg Oral Q6H PRN Roderick Reyna PA-C        Or    ondansetron (ZOFRAN) injection 4 mg  4 mg Intravenous Q6H PRN Roderick Reyna PA-C        Phosphorus Replacement - Follow Nurse / BPA Driven Protocol   Not Applicable PRN Roderick Reyna PA-C        Potassium Replacement - Follow Nurse / BPA Driven Protocol   Not Applicable PRN Roderick Reyna PA-C        sodium chloride 0.9 % flush 10 mL  10 mL Intravenous Q12H Aguilar Maira, APRN        sodium chloride 0.9 % flush 10 mL  10 mL Intravenous PRN Aguilar, Maira, APRN        sodium chloride 0.9 % infusion 40 mL  40 mL Intravenous PRN Aguilar, Maira, APRN        sodium chloride 0.9 % infusion  125 mL/hr Intravenous Continuous Roderick Reyna PA-C 125 mL/hr at 02/14/25 0154 125 mL/hr at 02/14/25 0154        ________________________________________________________     Objective   OBJECTIVE:  Upon today's exam, pt is awake and alert. Wife at BS. Pt is hard of hearing at baseline.     PHYSICAL EXAM:    CONSTITUTIONAL: The patient is in no apparent distress, bright awake and alert.      HEENT: There is no tenderness over the temporal arteries bilaterally. Normocephalic, atraumatic. TMJ open symmetrically without tenderness.    NECK: ROM normal, supple    RESPIRATORY: Breathing unlabored, Breath  sounds are normal    CARDIAC: Regular rate and rhythm.     EXTREMITIES:  No clubbing, cyanosis or edema.    PSYCHIATRIC: Mood/affect normal, judgement normal, appropriate    NEUROLOGICAL:    Cognition:   Fully oriented.  Fund of knowledge excellent.  Concentration and attention normal.   Language normal with normal comprehension, fluent speech, intact repetition and naming.   Short and long term memory appears intact    Cranial nerves;    II - pupils bilaterally equal reacting to light,  No new Visual field deficits;  Fundoscopic exam- Not able to be done, non-dilated exam  III,IV,VI: EOMI with no diplopia  V: Normal facial sensations  VII: No facial asymmetry,  VIII: No New hearing abnormality (chronic hearing loss)  IX, X, XI: normal gag and shoulder shrug;  XII: tongue is in the midline.    Sensory:  Intact to light touch in all extremities.     Motor: Strength 5/5 bilaterally upper and lower extremities. Drift in RUE. No involuntary movements present. Normal tone and bulk.  Deep tendon reflexes: 1/4 and symmetrical in biceps, brachioradialis, triceps, bilateral 0/4 knees and ankles. Left toes mute, right toes upgoing     Cerebellar: Finger to nose and mirror movements normal bilaterally.    Gait and balance: deferred      NIH Stroke Scale  Interval: 24 hrs post onset of symptoms +/- 20 mins  1a. Level of Consciousness: 0-->Alert, keenly responsive  1b. LOC Questions: 0-->Answers both questions correctly  1c. LOC Commands: 0-->Performs both tasks correctly  2. Best Gaze: 0-->Normal  3. Visual: 0-->No visual loss  4. Facial Palsy: 0-->Normal symmetrical movements  5a. Motor Arm, Left: 0-->No drift, limb holds 90 (or 45) degrees for full 10 secs  5b. Motor Arm, Right: 1-->Drift, limb holds 90 (or 45) degrees, but drifts down before full 10 secs, does not hit bed or other support  6a. Motor Leg, Left: 0-->No drift, leg holds 30 degree position for full 5 secs  6b. Motor Leg, Right: 0-->No drift, leg holds 30 degree  position for full 5 secs  7. Limb Ataxia: 0-->Absent  8. Sensory: 0-->Normal, no sensory loss  9. Best Language: 0-->No aphasia, normal  10. Dysarthria: 0-->Normal  11. Extinction and Inattention (formerly Neglect): 0-->No abnormality  Total (NIH Stroke Scale): 1         ________________________________________________________   RESULTS REVIEW:    VITAL SIGNS:   Temp:  [97.3 °F (36.3 °C)-97.9 °F (36.6 °C)] 97.9 °F (36.6 °C)  Heart Rate:  [55-78] 78  Resp:  [15-21] 19  BP: (102-146)/(34-98) 102/72     LABS:      Lab 02/13/25  0648   WBC 8.60   HEMOGLOBIN 13.7   HEMATOCRIT 43.9   PLATELETS 258   NEUTROS ABS 6.32   IMMATURE GRANS (ABS) 0.03   LYMPHS ABS 1.44   MONOS ABS 0.76   EOS ABS 0.02   MCV 92.2   PROCALCITONIN 0.29*         Lab 02/13/25  0648   SODIUM 138   POTASSIUM 4.4   CHLORIDE 99   CO2 22.2   ANION GAP 16.8*   BUN 38*   CREATININE 2.23*   EGFR 30.0*   GLUCOSE 284*   CALCIUM 9.2   TSH 4.610*         Lab 02/13/25  0648   TOTAL PROTEIN 7.9   ALBUMIN 4.0   GLOBULIN 3.9   ALT (SGPT) 25   AST (SGOT) 40   BILIRUBIN 0.3   ALK PHOS 51                     UA          2/13/2025    08:06   Urinalysis   Squamous Epithelial Cells, UA 3-6    Specific Gravity, UA 1.026    Ketones, UA Trace    Blood, UA Negative    Leukocytes, UA Negative    Nitrite, UA Negative    RBC, UA 0-2    WBC, UA 0-2    Bacteria, UA 2+        Lab Results   Component Value Date    TSH 4.610 (H) 02/13/2025       IMAGING STUDIES:  MRI Brain Without Contrast    Result Date: 2/14/2025  Impression: 1. Acute infarct involving anterior left thalamus. 2. Encephalomalacia involving right posterior temporal lobe related to sequela of remote infarct. 3. Generalized cerebral atrophy with mild white matter findings of chronic microvascular disease. I called findings to CECILLE and discussed with patient's nurse Annia at 7:23 a.m. on 2/14/2025. Electronically Signed: Demetrio Hall MD  2/14/2025 7:26 AM EST  Workstation ID: [id number]    US Renal Bilateral    Result  Date: 2/13/2025  Impression: No hydronephrosis or sonographically evident nephrolithiasis. Electronically Signed: Nestor Elaine  2/13/2025 7:02 PM EST  Workstation ID: [id number]    XR Spine Cervical 2 or 3 View    Result Date: 2/13/2025  Impression: 1.Changes from C4 corpectomy with ACDF at C3-C5. One or both of the C3 screws are fractured. 2.Multilevel degenerative changes as described above. Electronically Signed: Demario Bettencourt MD  2/13/2025 8:51 AM EST  Workstation ID: [id number]    CT Head Without Contrast    Result Date: 2/13/2025  Impression: 1. No acute intracranial abnormality. 2. Remote right posterior temporal infarct with encephalomalacia. 3. Cervical ACDF hardware partially imaged on  radiograph. C3 screw appears fractured, cervical radiographs may be helpful to better assess. Electronically Signed: Demetrio Hall MD  2/13/2025 7:31 AM EST  Workstation ID: [id number]    XR Chest 1 View    Result Date: 2/13/2025  Impression: No acute cardiopulmonary process. Electronically Signed: Beth Mnuoz MD  2/13/2025 6:52 AM EST  Workstation ID: [id number]     I reviewed the patient's new clinical results.    ________________________________________________________     PROBLEM LIST:    Influenza A    Acute ischemic VBA thalamic stroke, left            ASSESSMENT/PLAN:  1. Acute ischemic stroke involving the left thalamus. Likely small vessel disease second to age and other risk factors including HTN, HLD, DM, and acute infection with dehydration/RAÚL. LKW is unclear.   - MRI brain: Acute infarct involving anterior left thalamus. Encephalomalacia involving right posterior temporal lobe related to sequela of remote infarct. Generalized cerebral atrophy with mild white matter findings of chronic microvascular disease.  - MRA head and neck w/o contrast: Pending   - Echo: Pending   - EKG: Sinus bradycardia. Nonspecific  intraventricular conduction delay. No previous ECG available for comparison  - Labs: A1C: P, B12: P,  LDL: P, TSH: 4.61, ammonia: 24, P2y12: P  - Antithrombotics: Continue ASA and Plavix for now. Of note, pt did miss a few doses of his medications due to feeling poorly. Will check P2Y12 tomorrow. If poor responder to Plavix, will change to Brilinta and ASA.  - Statin: Lipitor 80mg qhs   - PT/OT/ST as appropriate, fall precautions as appropriate, Neuro checks per protocol, DVT prophylaxis, Stroke education    2. Influenza A  - Unvaccinated  - CXR with no acute findings, no concern for superimposed bacterial pneumonia  - Not on supplemental oxygen  - Symptomatic management per primary      3. Acute kidney injury  - Creatinine 2.23, BUN 38, previous baseline 0.8-0.9  - Nephrology consulted      4. CAD status post CABG, Nonischemic cardiomyopathy  - ASA and Plavix  - Per primary       5. Hypertension, acute hypotension   - Strict BP control, monitor per protocol    6. Hyperlipidemia  - Statin, recommend diet and lifestyle modifications    7. Type 2 diabetes mellitus  - Strict glycemic control, SSI per primary    Modification of stroke risk factors:   - Blood pressure should be less than 130/80 outpatient, HbA1c less than 6.5, LDL less than 70; b12>500 and smoking cessation if applicable. We would be grateful if the primary team / primary care physician would keep a close watch on the above targets.  - Stroke education  - Follow up with neurologist of choice      I discussed the patient's findings and my recommendations with patient, family, nursing staff, primary care team, and consulting provider    GINO Valdovinos  02/14/25  12:34 EST          Electronically signed by Maira Aguilar APRN at 02/14/25 1234       Demario Manley MD at 02/14/25 0551        Consult Orders    1. Inpatient Nephrology Consult [[id number]] ordered by Roderick Reyna PA-C at 02/13/25 1222                 [facility]:    Referring Provider: Dr. Ambrosio  Reason for Consultation: RAÚL    Subjective     Chief  complaint: RAÚL    History of present illness:  Patient is a 73 yo male who has been admitted for Influenza.  Cr 2.2 in the ER with baseline unknown.  He has a PMHx of CAD/CABG, HTN, DM and HLD.  He presented via EMS for cough, weakness and congestion.  Had also reported some possible R facial droop.  He has no other complaints this AM.      History  History reviewed. No pertinent past medical history., History reviewed. No pertinent surgical history., History reviewed. No pertinent family history.,   Social History     Socioeconomic History    Marital status:    Tobacco Use    Smoking status: Former     Types: Cigarettes     Start date: 1975    Smokeless tobacco: Never   Vaping Use    Vaping status: Never Used   Substance and Sexual Activity    Drug use: Never    Sexual activity: Not Currently     E-cigarette/Vaping    E-cigarette/Vaping Use Never User     Passive Exposure No     Counseling Given No      E-cigarette/Vaping Substances    Nicotine No     THC No     CBD No     Flavoring No      E-cigarette/Vaping Devices    Disposable No     Pre-filled or Refillable Cartridge No     Refillable Tank No     Pre-filled Pod No          ,   Medications Prior to Admission   Medication Sig Dispense Refill Last Dose/Taking    amLODIPine (NORVASC) 5 MG tablet Take 1 tablet by mouth Daily.   Taking    aspirin 81 MG EC tablet Take 1 tablet by mouth Daily.   Taking    clopidogrel (PLAVIX) 75 MG tablet Take 1 tablet by mouth Daily.   Taking    gemfibrozil (LOPID) 600 MG tablet Take 1 tablet by mouth Daily.   Taking    glipizide (GLUCOTROL) 5 MG tablet Take 0.5 tablets by mouth Daily.   Taking    isosorbide mononitrate (IMDUR) 60 MG 24 hr tablet Take 1 tablet by mouth Daily.   Taking    losartan (COZAAR) 50 MG tablet Take 1 tablet by mouth Daily.   Taking    metFORMIN ER (GLUCOPHAGE-XR) 500 MG 24 hr tablet Take 1 tablet by mouth 2 (Two) Times a Day.   Taking    acetaminophen (TYLENOL) 325 MG tablet Take 2 tablets by mouth  "Every 12 (Twelve) Hours As Needed for Mild Pain.      , Scheduled Meds:  amLODIPine, 5 mg, Oral, Daily  aspirin, 81 mg, Oral, Daily  clopidogrel, 75 mg, Oral, Daily  insulin lispro, 2-9 Units, Subcutaneous, 4x Daily AC & at Bedtime  isosorbide mononitrate, 60 mg, Oral, Daily  [Held by provider] losartan, 50 mg, Oral, Daily   , Continuous Infusions:  sodium chloride, 125 mL/hr, Last Rate: 125 mL/hr (02/14/25 0154)   , PRN Meds:    acetaminophen **OR** acetaminophen **OR** acetaminophen    senna-docusate sodium **AND** polyethylene glycol **AND** bisacodyl **AND** bisacodyl    Calcium Replacement - Follow Nurse / BPA Driven Protocol    dextrose    dextrose    glucagon (human recombinant)    Magnesium Standard Dose Replacement - Follow Nurse / BPA Driven Protocol    melatonin    ondansetron ODT **OR** ondansetron    Phosphorus Replacement - Follow Nurse / BPA Driven Protocol    Potassium Replacement - Follow Nurse / BPA Driven Protocol, and Allergies:  Patient has no known allergies.    Review of Systems  Pertinent items are noted in HPI    Objective     Vital Signs  Temp:  [97.3 °F (36.3 °C)-97.8 °F (36.6 °C)] 97.8 °F (36.6 °C)  Heart Rate:  [53-78] 55  Resp:  [15-21] 20  BP: ()/(34-98) 120/49    No intake/output data recorded.  I/O last 3 completed shifts:  In: 1000 [IV Piggyback:1000]  Out: -     Physical Exam:  GEN: Awake, NAD  ENT: PERRL, EOMI, MMM  NECK: Supple, no JVD  CHEST: CTAB, no W/R/C  CV: RRR, no M/G/R  ABD: Soft, NT, +BS  SKIN: Warm and Dry  NEURO: CN's intact      Results Review:   I reviewed the patient's new clinical results.    WBC WBC   Date Value Ref Range Status   02/13/2025 8.60 3.40 - 10.80 10*3/mm3 Final      HGB Hemoglobin   Date Value Ref Range Status   02/13/2025 13.7 13.0 - 17.7 g/dL Final      HCT Hematocrit   Date Value Ref Range Status   02/13/2025 43.9 37.5 - 51.0 % Final      Platlets No results found for: \"LABPLAT\"   MCV MCV   Date Value Ref Range Status   02/13/2025 92.2 79.0 - " "97.0 fL Final          Sodium Sodium   Date Value Ref Range Status   02/13/2025 138 136 - 145 mmol/L Final      Potassium Potassium   Date Value Ref Range Status   02/13/2025 4.4 3.5 - 5.2 mmol/L Final      Chloride Chloride   Date Value Ref Range Status   02/13/2025 99 98 - 107 mmol/L Final      CO2 CO2   Date Value Ref Range Status   02/13/2025 22.2 22.0 - 29.0 mmol/L Final      BUN BUN   Date Value Ref Range Status   02/13/2025 38 (H) 8 - 23 mg/dL Final      Creatinine Creatinine   Date Value Ref Range Status   02/13/2025 2.23 (H) 0.76 - 1.27 mg/dL Final      Calcium Calcium   Date Value Ref Range Status   02/13/2025 9.2 8.6 - 10.5 mg/dL Final      PO4 No results found for: \"CAPO4\"   Albumin Albumin   Date Value Ref Range Status   02/13/2025 4.0 3.5 - 5.2 g/dL Final      Magnesium No results found for: \"MG\"   Uric Acid No results found for: \"URICACID\"         amLODIPine, 5 mg, Oral, Daily  aspirin, 81 mg, Oral, Daily  clopidogrel, 75 mg, Oral, Daily  insulin lispro, 2-9 Units, Subcutaneous, 4x Daily AC & at Bedtime  isosorbide mononitrate, 60 mg, Oral, Daily  [Held by provider] losartan, 50 mg, Oral, Daily      sodium chloride, 125 mL/hr, Last Rate: 125 mL/hr (02/14/25 0154)        Assessment & Plan     1) RAÚL - pre-renal  2) Flu A  3) HTN  4) DM  5) CAD/CABG  6) HLD    PLAN: Continue IVF today and keep ARB on hold.  Avoid nephrotoxins and maintain stable hemodynamics.  Monitor UOP.  Will follow closely.        Demario Manley MD  [facility]  02/14/25  @NOW              Electronically signed by Demario Manley MD at 02/14/25 1232       "

## 2025-02-14 NOTE — PLAN OF CARE
Problem: Adult Inpatient Plan of Care  Goal: Plan of Care Review  Outcome: Progressing  Goal: Patient-Specific Goal (Individualized)  Outcome: Progressing  Goal: Absence of Hospital-Acquired Illness or Injury  Outcome: Progressing  Intervention: Identify and Manage Fall Risk  Recent Flowsheet Documentation  Taken 2/14/2025 0400 by Ganga De La Garza RNA  Safety Promotion/Fall Prevention:   safety round/check completed   room organization consistent   nonskid shoes/slippers when out of bed   mobility aid in reach   lighting adjusted   fall prevention program maintained   clutter free environment maintained   assistive device/personal items within reach   activity supervised  Taken 2/14/2025 0205 by Ganga De La Garza RNA  Safety Promotion/Fall Prevention:   safety round/check completed   room organization consistent   nonskid shoes/slippers when out of bed   lighting adjusted   fall prevention program maintained   clutter free environment maintained   assistive device/personal items within reach   activity supervised  Intervention: Prevent Skin Injury  Recent Flowsheet Documentation  Taken 2/14/2025 0400 by Ganga De La Garza RNA  Body Position:   position changed independently   weight shifting  Intervention: Prevent and Manage VTE (Venous Thromboembolism) Risk  Recent Flowsheet Documentation  Taken 2/14/2025 0400 by Ganga De La Garza RNA  VTE Prevention/Management: SCDs (sequential compression devices) on  Intervention: Prevent Infection  Recent Flowsheet Documentation  Taken 2/14/2025 0400 by Ganga De La Garza RNA  Infection Prevention: environmental surveillance performed  Taken 2/14/2025 0205 by Ganga De La Garza RNA  Infection Prevention: single patient room provided  Goal: Optimal Comfort and Wellbeing  Outcome: Progressing  Intervention: Provide Person-Centered Care  Recent Flowsheet Documentation  Taken 2/14/2025 0400 by Ganga De La Garza RNA  Trust Relationship/Rapport:   care  explained   choices provided   emotional support provided   empathic listening provided   questions answered   questions encouraged   reassurance provided   thoughts/feelings acknowledged  Goal: Readiness for Transition of Care  Outcome: Progressing     Problem: Fall Injury Risk  Goal: Absence of Fall and Fall-Related Injury  Outcome: Progressing  Intervention: Identify and Manage Contributors  Recent Flowsheet Documentation  Taken 2/14/2025 0400 by Ganga De La Garza RNA  Medication Review/Management: medications reviewed  Taken 2/14/2025 0205 by Ganga De La Garza RNA  Medication Review/Management: medications reviewed  Intervention: Promote Injury-Free Environment  Recent Flowsheet Documentation  Taken 2/14/2025 0400 by Ganga De La Garza RNA  Safety Promotion/Fall Prevention:   safety round/check completed   room organization consistent   nonskid shoes/slippers when out of bed   mobility aid in reach   lighting adjusted   fall prevention program maintained   clutter free environment maintained   assistive device/personal items within reach   activity supervised  Taken 2/14/2025 0205 by Ganga De La Garza RNA  Safety Promotion/Fall Prevention:   safety round/check completed   room organization consistent   nonskid shoes/slippers when out of bed   lighting adjusted   fall prevention program maintained   clutter free environment maintained   assistive device/personal items within reach   activity supervised     Problem: Stroke, Ischemic (Includes Transient Ischemic Attack)  Goal: Optimal Coping  Outcome: Progressing  Goal: Effective Bowel Elimination  Outcome: Progressing  Goal: Optimal Cerebral Tissue Perfusion  Outcome: Progressing  Intervention: Protect and Optimize Cerebral Perfusion  Recent Flowsheet Documentation  Taken 2/14/2025 0400 by Ganga De L aGarza RNA  Cerebral Perfusion Promotion: blood pressure monitored  Goal: Optimal Cognitive Function  Outcome: Progressing  Goal: Improved Communication  Skills  Outcome: Progressing  Goal: Optimal Functional Ability  Outcome: Progressing  Goal: Optimal Nutrition Intake  Outcome: Progressing  Goal: Effective Oxygenation and Ventilation  Outcome: Progressing  Intervention: Optimize Oxygenation and Ventilation  Recent Flowsheet Documentation  Taken 2/14/2025 0400 by Ganga De La Garza RNA  Head of Bed (HOB) Positioning: HOB elevated  Goal: Improved Sensorimotor Function  Outcome: Progressing  Intervention: Optimize Sensory and Perceptual Ability  Recent Flowsheet Documentation  Taken 2/14/2025 0400 by Ganga De La Garza RNA  Pressure Reduction Techniques: frequent weight shift encouraged  Pressure Reduction Devices: pressure-redistributing mattress utilized  Goal: Safe and Effective Swallow  Outcome: Progressing  Goal: Effective Urinary Elimination  Outcome: Progressing     Problem: Comorbidity Management  Goal: Blood Glucose Level Within Target Range  Outcome: Progressing  Intervention: Monitor and Manage Glycemia  Recent Flowsheet Documentation  Taken 2/14/2025 0400 by Ganga De La Garza RNA  Medication Review/Management: medications reviewed  Taken 2/14/2025 0205 by Ganga De La Garza RNA  Medication Review/Management: medications reviewed  Goal: Blood Pressure in Desired Range  Outcome: Progressing  Intervention: Maintain Blood Pressure Management  Recent Flowsheet Documentation  Taken 2/14/2025 0400 by Ganga De La Garza RNA  Medication Review/Management: medications reviewed  Taken 2/14/2025 0205 by Ganga De La Garza RNA  Medication Review/Management: medications reviewed   Goal Outcome Evaluation:

## 2025-02-14 NOTE — PROGRESS NOTES
[facility] MEDICINE SERVICE  DAILY PROGRESS NOTE    NAME: Luiz Connors  : 1950  MRN: [id number]      LOS: 1 day     PROVIDER OF SERVICE: Sav Ambrosio MD    Chief Complaint: Influenza A    Subjective:     Interval History:  History taken from: Patient and patient's chart     Denies any new complaints         Review of Systems:   Review of Systems  All negative except above  Objective:     Vital Signs  Temp:  [97.3 °F (36.3 °C)-97.9 °F (36.6 °C)] 97.9 °F (36.6 °C)  Heart Rate:  [55-78] 78  Resp:  [18-21] 19  BP: (102-144)/(34-81) 102/72  Flow (L/min) (Oxygen Therapy):  [2] 2   Body mass index is 28.82 kg/m².    Physical Exam  Physical Exam  General: Alert and oriented, no acute distress.  Lungs: Clear to auscultation, nonlabored respiration.  Heart: RRR, no murmur, gallop or edema.  Abdomen: Soft, nontender, nondistended, + bowel sounds.  Musculoskeletal: Normal range of motion and strength, no tenderness or swelling.  Neuro: alert and awake, moving all 4 extremities   Skin: Skin is warm, dry and pink, no rashes or lesions.  Psychiatric: Cooperative, appropriate mood and affect.         Diagnostic Data    Results from last 7 days   Lab Units 25  0648   WBC 10*3/mm3 8.60   HEMOGLOBIN g/dL 13.7   HEMATOCRIT % 43.9   PLATELETS 10*3/mm3 258   GLUCOSE mg/dL 284*   CREATININE mg/dL 2.23*   BUN mg/dL 38*   SODIUM mmol/L 138   POTASSIUM mmol/L 4.4   AST (SGOT) U/L 40   ALT (SGPT) U/L 25   ALK PHOS U/L 51   BILIRUBIN mg/dL 0.3   ANION GAP mmol/L 16.8*       MRI Brain Without Contrast    Result Date: 2025  Impression: 1. Acute infarct involving anterior left thalamus. 2. Encephalomalacia involving right posterior temporal lobe related to sequela of remote infarct. 3. Generalized cerebral atrophy with mild white matter findings of chronic microvascular disease. I called findings to CECILLE and discussed with patient's nurse Annia at 7:23 a.m. on 2025. Electronically Signed: Demetrio  MD Isabel  2/14/2025 7:26 AM EST  Workstation ID: [id number]    US Renal Bilateral    Result Date: 2/13/2025  Impression: No hydronephrosis or sonographically evident nephrolithiasis. Electronically Signed: Nestor Elaine  2/13/2025 7:02 PM EST  Workstation ID: [id number]    XR Spine Cervical 2 or 3 View    Result Date: 2/13/2025  Impression: 1.Changes from C4 corpectomy with ACDF at C3-C5. One or both of the C3 screws are fractured. 2.Multilevel degenerative changes as described above. Electronically Signed: Demario Bettencourt MD  2/13/2025 8:51 AM EST  Workstation ID: [id number]    CT Head Without Contrast    Result Date: 2/13/2025  Impression: 1. No acute intracranial abnormality. 2. Remote right posterior temporal infarct with encephalomalacia. 3. Cervical ACDF hardware partially imaged on  radiograph. C3 screw appears fractured, cervical radiographs may be helpful to better assess. Electronically Signed: Demetrio Hall MD  2/13/2025 7:31 AM EST  Workstation ID: [id number]    XR Chest 1 View    Result Date: 2/13/2025  Impression: No acute cardiopulmonary process. Electronically Signed: Beth Munoz MD  2/13/2025 6:52 AM EST  Workstation ID: [id number]       I have reviewed patient labs and imaging     Assessment/Plan:     Active and Resolved Problems  Altered mental status  Acute left anterior thalamus infarct   improved, baseline A&O x 4  MRI brain showed acute left anterior thalamic infarct   CTH with remote right posterior temporal infarct with encephalomalacia  Neurochecks, NIHSS  Neuro consulted   Fall precautions     Influenza A  Unvaccinated  CXR with no acute findings, no concern for superimposed bacterial pneumonia  Not on supplemental oxygen  Symptomatic management     Acute kidney injury  Creatinine 2.23, BUN 38, previous baseline 0.8-0.9  Renal ultrasound pending  IVFs  Avoid nephrotoxic medications, holding losartan  Continue to monitor BMP  Nephrology consulted      CAD status post CABG  Nonischemic  cardiomyopathy  Hypertension  Hyperlipidemia  Holding losartan in the setting of RAÚL  Continue amlodipine, Imdur  Continue ASA, Plavix     Type 2 diabetes mellitus  Mod SSI, hold glipizide  Consistent carb diet  Hypoglycemia protocol           VTE Prophylaxis:  Mechanical VTE prophylaxis orders are present.             Disposition Planning:     Barriers to Discharge:medical clearance   Anticipated Date of Discharge: 02/15  Place of Discharge: home      Time: 40 minutes     Code Status and Medical Interventions: CPR (Attempt to Resuscitate); Full Support   Ordered at: 02/13/25 0912     Code Status (Patient has no pulse and is not breathing):    CPR (Attempt to Resuscitate)     Medical Interventions (Patient has pulse or is breathing):    Full Support       Signature: Electronically signed by Sav Ambrosio MD, 02/14/25, 14:18 EST.  [facility]ist Team

## 2025-02-14 NOTE — CONSULTS
[facility]:    Referring Provider: Dr. Ambrosio  Reason for Consultation: RAÚL    Subjective     Chief complaint: RAÚL    History of present illness:  Patient is a 75 yo male who has been admitted for Influenza.  Cr 2.2 in the ER with baseline unknown.  He has a PMHx of CAD/CABG, HTN, DM and HLD.  He presented via EMS for cough, weakness and congestion.  Had also reported some possible R facial droop.  He has no other complaints this AM.      History  History reviewed. No pertinent past medical history., History reviewed. No pertinent surgical history., History reviewed. No pertinent family history.,   Social History     Socioeconomic History    Marital status:    Tobacco Use    Smoking status: Former     Types: Cigarettes     Start date: 1975    Smokeless tobacco: Never   Vaping Use    Vaping status: Never Used   Substance and Sexual Activity    Drug use: Never    Sexual activity: Not Currently     E-cigarette/Vaping    E-cigarette/Vaping Use Never User     Passive Exposure No     Counseling Given No      E-cigarette/Vaping Substances    Nicotine No     THC No     CBD No     Flavoring No      E-cigarette/Vaping Devices    Disposable No     Pre-filled or Refillable Cartridge No     Refillable Tank No     Pre-filled Pod No          ,   Medications Prior to Admission   Medication Sig Dispense Refill Last Dose/Taking    amLODIPine (NORVASC) 5 MG tablet Take 1 tablet by mouth Daily.   Taking    aspirin 81 MG EC tablet Take 1 tablet by mouth Daily.   Taking    clopidogrel (PLAVIX) 75 MG tablet Take 1 tablet by mouth Daily.   Taking    gemfibrozil (LOPID) 600 MG tablet Take 1 tablet by mouth Daily.   Taking    glipizide (GLUCOTROL) 5 MG tablet Take 0.5 tablets by mouth Daily.   Taking    isosorbide mononitrate (IMDUR) 60 MG 24 hr tablet Take 1 tablet by mouth Daily.   Taking    losartan (COZAAR) 50 MG tablet Take 1 tablet by mouth Daily.   Taking    metFORMIN ER (GLUCOPHAGE-XR) 500 MG 24 hr tablet  Take 1 tablet by mouth 2 (Two) Times a Day.   Taking    acetaminophen (TYLENOL) 325 MG tablet Take 2 tablets by mouth Every 12 (Twelve) Hours As Needed for Mild Pain.      , Scheduled Meds:  amLODIPine, 5 mg, Oral, Daily  aspirin, 81 mg, Oral, Daily  clopidogrel, 75 mg, Oral, Daily  insulin lispro, 2-9 Units, Subcutaneous, 4x Daily AC & at Bedtime  isosorbide mononitrate, 60 mg, Oral, Daily  [Held by provider] losartan, 50 mg, Oral, Daily   , Continuous Infusions:  sodium chloride, 125 mL/hr, Last Rate: 125 mL/hr (02/14/25 0154)   , PRN Meds:    acetaminophen **OR** acetaminophen **OR** acetaminophen    senna-docusate sodium **AND** polyethylene glycol **AND** bisacodyl **AND** bisacodyl    Calcium Replacement - Follow Nurse / BPA Driven Protocol    dextrose    dextrose    glucagon (human recombinant)    Magnesium Standard Dose Replacement - Follow Nurse / BPA Driven Protocol    melatonin    ondansetron ODT **OR** ondansetron    Phosphorus Replacement - Follow Nurse / BPA Driven Protocol    Potassium Replacement - Follow Nurse / BPA Driven Protocol, and Allergies:  Patient has no known allergies.    Review of Systems  Pertinent items are noted in HPI    Objective     Vital Signs  Temp:  [97.3 °F (36.3 °C)-97.8 °F (36.6 °C)] 97.8 °F (36.6 °C)  Heart Rate:  [53-78] 55  Resp:  [15-21] 20  BP: ()/(34-98) 120/49    No intake/output data recorded.  I/O last 3 completed shifts:  In: 1000 [IV Piggyback:1000]  Out: -     Physical Exam:  GEN: Awake, NAD  ENT: PERRL, EOMI, MMM  NECK: Supple, no JVD  CHEST: CTAB, no W/R/C  CV: RRR, no M/G/R  ABD: Soft, NT, +BS  SKIN: Warm and Dry  NEURO: CN's intact      Results Review:   I reviewed the patient's new clinical results.    WBC WBC   Date Value Ref Range Status   02/13/2025 8.60 3.40 - 10.80 10*3/mm3 Final      HGB Hemoglobin   Date Value Ref Range Status   02/13/2025 13.7 13.0 - 17.7 g/dL Final      HCT Hematocrit   Date Value Ref Range Status   02/13/2025 43.9 37.5 - 51.0  "% Final      Platlets No results found for: \"LABPLAT\"   MCV MCV   Date Value Ref Range Status   02/13/2025 92.2 79.0 - 97.0 fL Final          Sodium Sodium   Date Value Ref Range Status   02/13/2025 138 136 - 145 mmol/L Final      Potassium Potassium   Date Value Ref Range Status   02/13/2025 4.4 3.5 - 5.2 mmol/L Final      Chloride Chloride   Date Value Ref Range Status   02/13/2025 99 98 - 107 mmol/L Final      CO2 CO2   Date Value Ref Range Status   02/13/2025 22.2 22.0 - 29.0 mmol/L Final      BUN BUN   Date Value Ref Range Status   02/13/2025 38 (H) 8 - 23 mg/dL Final      Creatinine Creatinine   Date Value Ref Range Status   02/13/2025 2.23 (H) 0.76 - 1.27 mg/dL Final      Calcium Calcium   Date Value Ref Range Status   02/13/2025 9.2 8.6 - 10.5 mg/dL Final      PO4 No results found for: \"CAPO4\"   Albumin Albumin   Date Value Ref Range Status   02/13/2025 4.0 3.5 - 5.2 g/dL Final      Magnesium No results found for: \"MG\"   Uric Acid No results found for: \"URICACID\"         amLODIPine, 5 mg, Oral, Daily  aspirin, 81 mg, Oral, Daily  clopidogrel, 75 mg, Oral, Daily  insulin lispro, 2-9 Units, Subcutaneous, 4x Daily AC & at Bedtime  isosorbide mononitrate, 60 mg, Oral, Daily  [Held by provider] losartan, 50 mg, Oral, Daily      sodium chloride, 125 mL/hr, Last Rate: 125 mL/hr (02/14/25 0154)        Assessment & Plan     1) RAÚL - pre-renal  2) Flu A  3) HTN  4) DM  5) CAD/CABG  6) HLD    PLAN: Continue IVF today and keep ARB on hold.  Avoid nephrotoxins and maintain stable hemodynamics.  Monitor UOP.  Will follow closely.        Demario Manley MD  [facility]  02/14/25  @NOW            "

## 2025-02-14 NOTE — THERAPY EVALUATION
Patient Name: Luiz Connors  : 1950    MRN: [id number]                              Today's Date: 2025       Admit Date: 2025    Visit Dx:     ICD-10-CM ICD-9-CM   1. Altered mental status, unspecified altered mental status type  R41.82 780.97   2. Influenza A  J10.1 487.1   3. RAÚL (acute kidney injury)  N17.9 584.9     Patient Active Problem List   Diagnosis    Influenza A    Acute ischemic VBA thalamic stroke, left    Acute ischemic stroke     History reviewed. No pertinent past medical history.  History reviewed. No pertinent surgical history.   General Information       Row Name 25 165          OT Time and Intention    Document Type evaluation  -MP     Mode of Treatment occupational therapy  -MP     Patient Effort good  -MP       Row Name 25 165          General Information    Patient Profile Reviewed yes  -MP     Prior Level of Function independent:;ADL's  -MP     Existing Precautions/Restrictions no known precautions/restrictions  -MP       Row Name 25          Living Environment    People in Home spouse  -MP       Row Name 25 165          Cognition    Orientation Status (Cognition) oriented x 3;disoriented to;situation  -MP       Row Name 25 165          Safety Issues/Impairments Affecting Functional Mobility    Impairments Affecting Function (Mobility) cognition  -MP               User Key  (r) = Recorded By, (t) = Taken By, (c) = Cosigned By      Initials Name Provider Type    MP Nathan Del Castillo OT Occupational Therapist                     Mobility/ADL's       Row Name 25 165          Bed Mobility    Bed Mobility bed mobility (all) activities  -MP     All Activities, [place] (Bed Mobility) contact guard  -MP       Row Name 25 165          Bed-Chair Transfer    Bed-Chair [place] (Transfers) standby assist  -MP       Row Name 25 165          Sit-Stand Transfer    Sit-Stand [place] (Transfers) standby assist  -MP        Row Name 02/14/25 1650          Activities of Daily Living    BADL Assessment/Intervention lower body dressing  -MP       Row Name 02/14/25 1650          Lower Body Dressing Assessment/Training    [place] Level (Lower Body Dressing) lower body dressing skills;socks;set up  -MP               User Key  (r) = Recorded By, (t) = Taken By, (c) = Cosigned By      Initials Name Provider Type    Nathan Gruber OT Occupational Therapist                   Obj/Interventions       Row Name 02/14/25 1654          Range of Motion Comprehensive    Comment, General Range of Motion BUE WFL  -MP       Row Name 02/14/25 1654          Strength Comprehensive (MMT)    Comment, General Manual Muscle Testing (MMT) Assessment BUE WFL  -MP       Row Name 02/14/25 1654          Balance    Balance Interventions sitting;standing;sit to stand;supported;static;dynamic  -MP               User Key  (r) = Recorded By, (t) = Taken By, (c) = Cosigned By      Initials Name Provider Type    Nathan Gruber OT Occupational Therapist                   Goals/Plan    No documentation.                  Clinical Impression       Row Name 02/14/25 1656          Pain Assessment    Pretreatment Pain Rating 0/10 - no pain  -MP     Posttreatment Pain Rating 0/10 - no pain  -MP       Row Name 02/14/25 1659 02/14/25 1656       Plan of Care Review    Plan of Care Reviewed With -- patient  -MP    Progress -- no change  -MP    Outcome Evaluation Pt. is a 73 y/o M admitted [facility] on [facility]/13/25 for confusion, R facial droop, difficulty speaking. RVP (+) flu A. MRI brain (+) acute L anterior thalamic infarct. Pt. lives at home w/ spouse and maintains I/ADL independence at baseline. Pt. completes functional transfers this date unassisted, ambulates w/ supervision for safety. Pt. provided setup assist for all LB ADLs. Pt. w/ even strength BUE WFL, does not demonstrate CVA deficits this date w/ exception to impaired cognition. Impacts to STM, pt.  spouse confirms this date. SLP to follow for aforementioned deficits, pt. at baseline functional ADL [place] and does not require continued skilled OT at this time. Recommend d/c home w/ spouse support/assist PRN.  -MP --      Row Name 02/14/25 1656          Therapy Assessment/Plan (OT)    Patient/Family Therapy Goal Statement (OT) Pt. is a 73 y/o M admitted to [facility] on 2/13/25 for confusion, R facial droop, difficulty speaking. RVP (+) flu A. MRI brain (+) acute L anterior thalamic infarct. Pt. lives at home w/ spouse and maintains I/ADL independence at baseline. Pt. completes functional transfers this date unassisted, ambulates w/ supervision for safety. Pt. provided setup assist for all LB ADLs. Pt. w/ even strength BUE WFL, does not demonstrate CVA deficits this date w/ exception to impaired cognition. Impacts to STM, pt. spouse confirms this date. SLP to follow for aforementioned deficits, pt. at baseline functional ADL [place] and does not require continued skilled OT at this time. Recommend d/c home w/ spouse support/assist PRN.  -MP     Rehab Potential (OT) good  -MP     Criteria for Skilled Therapeutic Interventions Met (OT) no;no problems identified which require skilled intervention;does not meet criteria for skilled intervention  -MP     Therapy Frequency (OT) evaluation only  -MP       Row Name 02/14/25 1656          Therapy Plan Review/Discharge Plan (OT)    Anticipated Discharge Disposition (OT) home with assist  -MP       Row Name 02/14/25 1656          Vital Signs    Pre Patient Position Supine  -MP     Intra Patient Position Standing  -MP     Post Patient Position Sitting  -MP       Row Name 02/14/25 1656          Positioning and Restraints    Pre-Treatment Position in bed  -MP     Post Treatment Position chair  -MP     In Chair sitting;call light within reach;encouraged to call for assist;exit alarm on  -MP               User Key  (r) = Recorded By, (t) = Taken By, (c) = Cosigned By       Initials Name Provider Type    MP Nathan Del Castillo OT Occupational Therapist                   Outcome Measures       Row Name 02/14/25 1600 02/14/25 1546       How much help from another person do you currently need...    Turning from your back to your side while in flat bed without using bedrails? 4  -ME 4  -OD    Moving from lying on back to sitting on the side of a flat bed without bedrails? 4  -ME 4  -OD    Moving to and from a bed to a chair (including a wheelchair)? 4  -ME 4  -OD    Standing up from a chair using your arms (e.g., wheelchair, bedside chair)? 4  -ME 4  -OD    Climbing 3-5 steps with a railing? 4  -ME 4  -OD    To walk in hospital room? 4  -ME 4  -OD    AM-PAC 6 Clicks Score (PT) 24  -ME 24  -OD    Highest Level of Mobility Goal 8 --> Walked 250 feet or more  -ME 8 --> Walked 250 feet or more  -OD      Row Name 02/14/25 1158 02/14/25 0933       How much help from another person do you currently need...    Turning from your back to your side while in flat bed without using bedrails? 4  -ME 4  -ME    Moving from lying on back to sitting on the side of a flat bed without bedrails? 4  -ME 4  -ME    Moving to and from a bed to a chair (including a wheelchair)? 4  -ME 4  -ME    Standing up from a chair using your arms (e.g., wheelchair, bedside chair)? 4  -ME 4  -ME    Climbing 3-5 steps with a railing? 4  -ME 4  -ME    To walk in hospital room? 4  -ME 4  -ME    AM-PAC 6 Clicks Score (PT) 24  -ME 24  -ME    Highest Level of Mobility Goal 8 --> Walked 250 feet or more  -ME 8 --> Walked 250 feet or more  -ME      Row Name 02/14/25 1546          Functional Assessment    Outcome Measure Options AM-PAC 6 Clicks Basic Mobility (PT)  -OD               User Key  (r) = Recorded By, (t) = Taken By, (c) = Cosigned By      Initials Name Provider Type    OD Cristal Lozano, PT Physical Therapist    ME Annia Poole, RN Registered Nurse                    Occupational Therapy Education        No education to  display                  OT Recommendation and Plan  Therapy Frequency (OT): evaluation only  Plan of Care Review  Plan of Care Reviewed With: patient  Progress: no change  Outcome Evaluation: Pt. is a 75 y/o M admitted to [facility] on 2/13/25 for confusion, R facial droop, difficulty speaking. RVP (+) flu A. MRI brain (+) acute L anterior thalamic infarct. Pt. lives at home w/ spouse and maintains I/ADL independence at baseline. Pt. completes functional transfers this date unassisted, ambulates w/ supervision for safety. Pt. provided setup assist for all LB ADLs. Pt. w/ even strength BUE WFL, does not demonstrate CVA deficits this date w/ exception to impaired cognition. Impacts to STM, pt. spouse confirms this date. SLP to follow for aforementioned deficits, pt. at baseline functional ADL [place] and does not require continued skilled OT at this time. Recommend d/c home w/ spouse support/assist PRN.     Time Calculation:         Time Calculation- OT       Row Name 02/14/25 1700             Time Calculation- OT    OT Start Time 1510  -MP      OT Stop Time 1529  -MP      OT Time Calculation (min) 19 min  -MP      Total Timed Code Minutes- OT 0 minute(s)  -MP      OT Received On 02/14/25  -                User Key  (r) = Recorded By, (t) = Taken By, (c) = Cosigned By      Initials Name Provider Type    Nathan Gruber OT Occupational Therapist                  Therapy Charges for Today       Code Description Service Date Service Provider Modifiers Qty    [id number] HC OT EVAL LOW COMPLEXITY 4 2/14/2025 Nathan Del Castillo OT GO 1                 Nathan Del Castillo OT  2/14/2025

## 2025-02-14 NOTE — PLAN OF CARE
Goal Outcome Evaluation:  Plan of Care Reviewed With: patient, spouse        Progress: improving  Outcome Evaluation: Luiz Connors is a 73 y/o M admitted to [facility] on 2/13/25 for confusion, R facial droop, difficulty speaking. RVP (+) flu A. MRI brain (+) acute L anterior thalamic infarct. At baseline, pt lives with his spouse in [facility] with [facility], and is a highly active individual, still working part-time. This date, pt is AAOx3, disoriented to situation, and demonstrates deficits mostly related to his cognition and executive functioning. Strength, sensation, coordination, oculomotor exam all WNL. Balance mildly impaired on L-side, but that is related to prior L ankle fusion. Ambulated x 200 ft with CGA-SBA. Pt appears at baseline function, but would benefit from Speech Therapy, as he remains to have difficulty with cognition. Will complete orders at this time.    Anticipated Discharge Disposition (PT): home with assist

## 2025-02-14 NOTE — PLAN OF CARE
Problem: Adult Inpatient Plan of Care  Goal: Plan of Care Review  Recent Flowsheet Documentation  Taken 2/14/2025 1659 by Nathan Del Castillo, TIKI  Outcome Evaluation: Pt. is a 73 y/o M admitted to [facility] on 2/13/25 for confusion, R facial droop, difficulty speaking. RVP (+) flu A. MRI brain (+) acute L anterior thalamic infarct. Pt. lives at home w/ spouse and maintains I/ADL independence at baseline. Pt. completes functional transfers this date unassisted, ambulates w/ supervision for safety. Pt. provided setup assist for all LB ADLs. Pt. w/ even strength BUE WFL, does not demonstrate CVA deficits this date w/ exception to impaired cognition. Impacts to STM, pt. spouse confirms this date. SLP to follow for aforementioned deficits, pt. at baseline functional ADL [place] and does not require continued skilled OT at this time. Recommend d/c home w/ spouse support/assist PRN.  Taken 2/14/2025 1656 by Nathan Del Castillo, TIKI  Progress: no change   Goal Outcome Evaluation:  Plan of Care Reviewed With: patient        Progress: no change       Anticipated Discharge Disposition (OT): home with assist

## 2025-02-14 NOTE — PLAN OF CARE
Problem: Adult Inpatient Plan of Care  Goal: Plan of Care Review  Outcome: Progressing  Goal: Patient-Specific Goal (Individualized)  Outcome: Progressing  Goal: Absence of Hospital-Acquired Illness or Injury  Outcome: Progressing  Intervention: Identify and Manage Fall Risk  Recent Flowsheet Documentation  Taken 2/14/2025 1600 by Annia Poole RN  Safety Promotion/Fall Prevention:   safety round/check completed   room organization consistent   nonskid shoes/slippers when out of bed   fall prevention program maintained   clutter free environment maintained   assistive device/personal items within reach  Taken 2/14/2025 1400 by Annia Poole RN  Safety Promotion/Fall Prevention:   safety round/check completed   room organization consistent   nonskid shoes/slippers when out of bed   fall prevention program maintained   clutter free environment maintained   assistive device/personal items within reach  Taken 2/14/2025 1158 by Annia Poole RN  Safety Promotion/Fall Prevention:   safety round/check completed   room organization consistent   nonskid shoes/slippers when out of bed   fall prevention program maintained   clutter free environment maintained   assistive device/personal items within reach  Taken 2/14/2025 1000 by Annia Poole RN  Safety Promotion/Fall Prevention:   safety round/check completed   room organization consistent   nonskid shoes/slippers when out of bed   fall prevention program maintained   clutter free environment maintained   assistive device/personal items within reach  Taken 2/14/2025 0933 by Annia Poole, RN  Safety Promotion/Fall Prevention:   safety round/check completed   room organization consistent   nonskid shoes/slippers when out of bed   fall prevention program maintained   clutter free environment maintained   assistive device/personal items within reach  Taken 2/14/2025 0800 by Annia Poole RN  Safety Promotion/Fall Prevention:   safety round/check  completed   room organization consistent   nonskid shoes/slippers when out of bed   fall prevention program maintained   clutter free environment maintained   assistive device/personal items within reach  Intervention: Prevent Skin Injury  Recent Flowsheet Documentation  Taken 2/14/2025 1600 by Annia Poole RN  Body Position: position changed independently  Skin Protection: transparent dressing maintained  Taken 2/14/2025 1158 by Annia Poole RN  Body Position: position changed independently  Skin Protection: transparent dressing maintained  Taken 2/14/2025 0933 by Annia Poole RN  Body Position: position changed independently  Intervention: Prevent and Manage VTE (Venous Thromboembolism) Risk  Recent Flowsheet Documentation  Taken 2/14/2025 0933 by Annia Poole RN  VTE Prevention/Management:   bilateral   SCDs (sequential compression devices) on  Intervention: Prevent Infection  Recent Flowsheet Documentation  Taken 2/14/2025 1600 by Annia Poole RN  Infection Prevention:   single patient room provided   rest/sleep promoted   personal protective equipment utilized   hand hygiene promoted  Taken 2/14/2025 1400 by Annia Poole RN  Infection Prevention:   single patient room provided   rest/sleep promoted   personal protective equipment utilized   hand hygiene promoted  Taken 2/14/2025 1158 by Annia Poole RN  Infection Prevention:   single patient room provided   rest/sleep promoted   personal protective equipment utilized   hand hygiene promoted  Taken 2/14/2025 1000 by Annia Poole RN  Infection Prevention:   single patient room provided   rest/sleep promoted   personal protective equipment utilized   hand hygiene promoted  Taken 2/14/2025 0933 by Annia Poole RN  Infection Prevention:   single patient room provided   rest/sleep promoted   hand hygiene promoted   personal protective equipment utilized  Taken 2/14/2025 0800 by Annia Poole RN  Infection  Prevention:   single patient room provided   rest/sleep promoted   personal protective equipment utilized   hand hygiene promoted  Goal: Optimal Comfort and Wellbeing  Outcome: Progressing  Intervention: Provide Person-Centered Care  Recent Flowsheet Documentation  Taken 2/14/2025 1600 by Annia Poole, RN  Trust Relationship/Rapport:   care explained   choices provided   questions answered  Taken 2/14/2025 1158 by Annia Poole, RN  Trust Relationship/Rapport:   care explained   choices provided   questions answered  Goal: Readiness for Transition of Care  Outcome: Progressing   Goal Outcome Evaluation:

## 2025-02-15 ENCOUNTER — APPOINTMENT (OUTPATIENT)
Dept: CARDIOLOGY | Facility: HOSPITAL | Age: 75
DRG: 065 | End: 2025-02-15
Payer: MEDICARE

## 2025-02-15 LAB
ANION GAP SERPL CALCULATED.3IONS-SCNC: 11.8 MMOL/L (ref 5–15)
AORTIC DIMENSIONLESS INDEX: 0.69 (DI)
AV MEAN PRESS GRAD SYS DOP V1V2: 5 MMHG
AV VMAX SYS DOP: 152 CM/SEC
BH CV ECHO MEAS - AO MAX PG: 9.2 MMHG
BH CV ECHO MEAS - AO V2 VTI: 35.4 CM
BH CV ECHO MEAS - AVA(I,D): 2.31 CM2
BH CV ECHO MEAS - EDV(CUBED): 195.1 ML
BH CV ECHO MEAS - EDV(MOD-SP4): 104 ML
BH CV ECHO MEAS - EF(MOD-SP4): 28.4 %
BH CV ECHO MEAS - ESV(CUBED): 148.9 ML
BH CV ECHO MEAS - ESV(MOD-SP4): 74.5 ML
BH CV ECHO MEAS - FS: 8.6 %
BH CV ECHO MEAS - IVS/LVPW: 0.91 CM
BH CV ECHO MEAS - IVSD: 1 CM
BH CV ECHO MEAS - LA DIMENSION: 4.9 CM
BH CV ECHO MEAS - LAT PEAK E' VEL: 8.8 CM/SEC
BH CV ECHO MEAS - LV DIASTOLIC VOL/BSA (35-75): 53.3 CM2
BH CV ECHO MEAS - LV MASS(C)D: 248.5 GRAMS
BH CV ECHO MEAS - LV MAX PG: 4.4 MMHG
BH CV ECHO MEAS - LV MEAN PG: 3 MMHG
BH CV ECHO MEAS - LV SYSTOLIC VOL/BSA (12-30): 38.2 CM2
BH CV ECHO MEAS - LV V1 MAX: 105 CM/SEC
BH CV ECHO MEAS - LV V1 VTI: 23.6 CM
BH CV ECHO MEAS - LVIDD: 5.8 CM
BH CV ECHO MEAS - LVIDS: 5.3 CM
BH CV ECHO MEAS - LVOT AREA: 3.5 CM2
BH CV ECHO MEAS - LVOT DIAM: 2.1 CM
BH CV ECHO MEAS - LVPWD: 1.1 CM
BH CV ECHO MEAS - MED PEAK E' VEL: 4.9 CM/SEC
BH CV ECHO MEAS - MR MAX PG: 53 MMHG
BH CV ECHO MEAS - MR MAX VEL: 364 CM/SEC
BH CV ECHO MEAS - MV A MAX VEL: 27.3 CM/SEC
BH CV ECHO MEAS - MV DEC SLOPE: 568 CM/SEC2
BH CV ECHO MEAS - MV DEC TIME: 0.14 SEC
BH CV ECHO MEAS - MV E MAX VEL: 104.3 CM/SEC
BH CV ECHO MEAS - MV E/A: 3.8
BH CV ECHO MEAS - MV MAX PG: 5.2 MMHG
BH CV ECHO MEAS - MV MEAN PG: 2 MMHG
BH CV ECHO MEAS - MV P1/2T: 59.8 MSEC
BH CV ECHO MEAS - MV V2 VTI: 30.9 CM
BH CV ECHO MEAS - MVA(P1/2T): 3.7 CM2
BH CV ECHO MEAS - MVA(VTI): 2.6 CM2
BH CV ECHO MEAS - PA ACC TIME: 0.1 SEC
BH CV ECHO MEAS - PA V2 MAX: 98.4 CM/SEC
BH CV ECHO MEAS - RV MAX PG: 1.32 MMHG
BH CV ECHO MEAS - RV V1 MAX: 57.5 CM/SEC
BH CV ECHO MEAS - RV V1 VTI: 10.1 CM
BH CV ECHO MEAS - SV(LVOT): 81.7 ML
BH CV ECHO MEAS - SV(MOD-SP4): 29.5 ML
BH CV ECHO MEAS - SVI(LVOT): 41.9 ML/M2
BH CV ECHO MEAS - SVI(MOD-SP4): 15.1 ML/M2
BH CV ECHO MEAS - TAPSE (>1.6): 1.46 CM
BH CV ECHO MEASUREMENTS AVERAGE E/E' RATIO: 15.23
BH CV ECHO SHUNT ASSESSMENT PERFORMED (HIDDEN SCRIPTING): 1
BH CV XLRA - RV BASE: 3.4 CM
BH CV XLRA - TDI S': 6.6 CM/SEC
BUN SERPL-MCNC: 19 MG/DL (ref 8–23)
BUN/CREAT SERPL: 22.6 (ref 7–25)
CALCIUM SPEC-SCNC: 9.2 MG/DL (ref 8.6–10.5)
CHLORIDE SERPL-SCNC: 104 MMOL/L (ref 98–107)
CO2 SERPL-SCNC: 23.2 MMOL/L (ref 22–29)
CREAT SERPL-MCNC: 0.84 MG/DL (ref 0.76–1.27)
DEPRECATED RDW RBC AUTO: 42.6 FL (ref 37–54)
EGFRCR SERPLBLD CKD-EPI 2021: 90.9 ML/MIN/1.73
EOSINOPHIL # BLD MANUAL: 0.45 10*3/MM3 (ref 0–0.4)
EOSINOPHIL NFR BLD MANUAL: 6 % (ref 0.3–6.2)
ERYTHROCYTE [DISTWIDTH] IN BLOOD BY AUTOMATED COUNT: 13 % (ref 12.3–15.4)
GLUCOSE BLDC GLUCOMTR-MCNC: 152 MG/DL (ref 70–105)
GLUCOSE BLDC GLUCOMTR-MCNC: 160 MG/DL (ref 70–105)
GLUCOSE BLDC GLUCOMTR-MCNC: 177 MG/DL (ref 70–105)
GLUCOSE BLDC GLUCOMTR-MCNC: 195 MG/DL (ref 70–105)
GLUCOSE BLDC GLUCOMTR-MCNC: 218 MG/DL (ref 70–105)
GLUCOSE BLDC GLUCOMTR-MCNC: 231 MG/DL (ref 70–105)
GLUCOSE SERPL-MCNC: 177 MG/DL (ref 65–99)
HCT VFR BLD AUTO: 38.7 % (ref 37.5–51)
HGB BLD-MCNC: 12.4 G/DL (ref 13–17.7)
LARGE PLATELETS: ABNORMAL
LEFT ATRIUM VOLUME INDEX: 35.7 ML/M2
LV EF BIPLANE MOD: 29 %
LYMPHOCYTES # BLD MANUAL: 2.86 10*3/MM3 (ref 0.7–3.1)
LYMPHOCYTES NFR BLD MANUAL: 3 % (ref 5–12)
MAGNESIUM SERPL-MCNC: 1.5 MG/DL (ref 1.6–2.4)
MCH RBC QN AUTO: 28.5 PG (ref 26.6–33)
MCHC RBC AUTO-ENTMCNC: 32 G/DL (ref 31.5–35.7)
MCV RBC AUTO: 89 FL (ref 79–97)
MONOCYTES # BLD: 0.23 10*3/MM3 (ref 0.1–0.9)
NEUTROPHILS # BLD AUTO: 3.91 10*3/MM3 (ref 1.7–7)
NEUTROPHILS NFR BLD MANUAL: 48 % (ref 42.7–76)
NEUTS BAND NFR BLD MANUAL: 4 % (ref 0–5)
PA ADP PRP-ACNC: 199 PRU (ref 194–418)
PHOSPHATE SERPL-MCNC: 2.6 MG/DL (ref 2.5–4.5)
PLASMA CELL PREC NFR BLD MANUAL: 1 % (ref 0–0)
PLATELET # BLD AUTO: 241 10*3/MM3 (ref 140–450)
PMV BLD AUTO: 10.6 FL (ref 6–12)
POTASSIUM SERPL-SCNC: 4.1 MMOL/L (ref 3.5–5.2)
RBC # BLD AUTO: 4.35 10*6/MM3 (ref 4.14–5.8)
RBC MORPH BLD: NORMAL
SCAN SLIDE: NORMAL
SINUS: 2.9 CM
SODIUM SERPL-SCNC: 139 MMOL/L (ref 136–145)
VARIANT LYMPHS NFR BLD MANUAL: 1 % (ref 0–5)
VARIANT LYMPHS NFR BLD MANUAL: 37 % (ref 19.6–45.3)
WBC NRBC COR # BLD AUTO: 7.52 10*3/MM3 (ref 3.4–10.8)

## 2025-02-15 PROCEDURE — 80048 BASIC METABOLIC PNL TOTAL CA: CPT | Performed by: INTERNAL MEDICINE

## 2025-02-15 PROCEDURE — 85576 BLOOD PLATELET AGGREGATION: CPT | Performed by: NURSE PRACTITIONER

## 2025-02-15 PROCEDURE — 93306 TTE W/DOPPLER COMPLETE: CPT

## 2025-02-15 PROCEDURE — 93306 TTE W/DOPPLER COMPLETE: CPT | Performed by: INTERNAL MEDICINE

## 2025-02-15 PROCEDURE — 63710000001 INSULIN LISPRO (HUMAN) PER 5 UNITS

## 2025-02-15 PROCEDURE — 99222 1ST HOSP IP/OBS MODERATE 55: CPT | Performed by: INTERNAL MEDICINE

## 2025-02-15 PROCEDURE — 82948 REAGENT STRIP/BLOOD GLUCOSE: CPT

## 2025-02-15 PROCEDURE — 25010000002 MAGNESIUM SULFATE 2 GM/50ML SOLUTION: Performed by: INTERNAL MEDICINE

## 2025-02-15 PROCEDURE — 82948 REAGENT STRIP/BLOOD GLUCOSE: CPT | Performed by: NURSE PRACTITIONER

## 2025-02-15 PROCEDURE — 99233 SBSQ HOSP IP/OBS HIGH 50: CPT | Performed by: NURSE PRACTITIONER

## 2025-02-15 PROCEDURE — 83735 ASSAY OF MAGNESIUM: CPT | Performed by: INTERNAL MEDICINE

## 2025-02-15 PROCEDURE — 85025 COMPLETE CBC W/AUTO DIFF WBC: CPT | Performed by: INTERNAL MEDICINE

## 2025-02-15 PROCEDURE — 85007 BL SMEAR W/DIFF WBC COUNT: CPT | Performed by: INTERNAL MEDICINE

## 2025-02-15 PROCEDURE — 84100 ASSAY OF PHOSPHORUS: CPT | Performed by: INTERNAL MEDICINE

## 2025-02-15 RX ORDER — MAGNESIUM SULFATE HEPTAHYDRATE 40 MG/ML
2 INJECTION, SOLUTION INTRAVENOUS ONCE
Status: COMPLETED | OUTPATIENT
Start: 2025-02-15 | End: 2025-02-15

## 2025-02-15 RX ADMIN — AMLODIPINE BESYLATE 5 MG: 5 TABLET ORAL at 09:34

## 2025-02-15 RX ADMIN — Medication 10 ML: at 20:32

## 2025-02-15 RX ADMIN — MAGNESIUM SULFATE IN WATER FOR 2 G: 40 INJECTION INTRAVENOUS at 09:34

## 2025-02-15 RX ADMIN — INSULIN LISPRO 2 UNITS: 100 INJECTION, SOLUTION INTRAVENOUS; SUBCUTANEOUS at 09:34

## 2025-02-15 RX ADMIN — ATORVASTATIN CALCIUM 80 MG: 40 TABLET, FILM COATED ORAL at 20:32

## 2025-02-15 RX ADMIN — INSULIN LISPRO 4 UNITS: 100 INJECTION, SOLUTION INTRAVENOUS; SUBCUTANEOUS at 13:55

## 2025-02-15 RX ADMIN — INSULIN LISPRO 2 UNITS: 100 INJECTION, SOLUTION INTRAVENOUS; SUBCUTANEOUS at 17:50

## 2025-02-15 RX ADMIN — ACETAMINOPHEN 650 MG: 325 TABLET, FILM COATED ORAL at 20:32

## 2025-02-15 RX ADMIN — CLOPIDOGREL BISULFATE 75 MG: 75 TABLET ORAL at 09:34

## 2025-02-15 RX ADMIN — ISOSORBIDE MONONITRATE 60 MG: 60 TABLET, EXTENDED RELEASE ORAL at 09:34

## 2025-02-15 RX ADMIN — INSULIN LISPRO 4 UNITS: 100 INJECTION, SOLUTION INTRAVENOUS; SUBCUTANEOUS at 20:31

## 2025-02-15 RX ADMIN — ASPIRIN 81 MG: 81 TABLET, COATED ORAL at 09:34

## 2025-02-15 RX ADMIN — Medication 10 ML: at 09:35

## 2025-02-15 NOTE — PROGRESS NOTES
"RENAL/KCC:     LOS: 2 days    Patient Care Team:  Joie Issa APRN as PCP - General (Nurse Practitioner)    Chief Complaint:  RAÚL    Subjective     Interval History:   Chart reviewed  Feels well    Objective     Vital Sign Min/Max for last 24 hours  Temp  Min: 97.7 °F (36.5 °C)  Max: 98 °F (36.7 °C)   BP  Min: 102/72  Max: 155/77   Pulse  Min: 53  Max: 81   Resp  Min: 13  Max: 22   SpO2  Min: 94 %  Max: 96 %   No data recorded   No data recorded     Flowsheet Rows      Flowsheet Row First Filed Value   Admission Height 170.2 cm (67\") Documented at 02/13/2025 0906   Admission Weight 81.6 kg (180 lb) Documented at 02/13/2025 0906            No intake/output data recorded.  I/O last 3 completed shifts:  In: 1720 [P.O.:720; IV Piggyback:1000]  Out: -     Physical Exam:  GEN: Awake, NAD  ENT: PERRL, EOMI, MMM  NECK: Supple, no JVD  CHEST: CTAB, no W/R/C  CV: RRR, no M/G/R  ABD: Soft, NT, +BS  SKIN: Warm and Dry  NEURO: CN's intact      WBC WBC   Date Value Ref Range Status   02/15/2025 7.52 3.40 - 10.80 10*3/mm3 Final   02/13/2025 8.60 3.40 - 10.80 10*3/mm3 Final      HGB Hemoglobin   Date Value Ref Range Status   02/15/2025 12.4 (L) 13.0 - 17.7 g/dL Final   02/13/2025 13.7 13.0 - 17.7 g/dL Final      HCT Hematocrit   Date Value Ref Range Status   02/15/2025 38.7 37.5 - 51.0 % Final   02/13/2025 43.9 37.5 - 51.0 % Final      Platlets No results found for: \"LABPLAT\"   MCV MCV   Date Value Ref Range Status   02/15/2025 89.0 79.0 - 97.0 fL Final   02/13/2025 92.2 79.0 - 97.0 fL Final          Sodium Sodium   Date Value Ref Range Status   02/15/2025 139 136 - 145 mmol/L Final   02/13/2025 138 136 - 145 mmol/L Final      Potassium Potassium   Date Value Ref Range Status   02/15/2025 4.1 3.5 - 5.2 mmol/L Final   02/13/2025 4.4 3.5 - 5.2 mmol/L Final      Chloride Chloride   Date Value Ref Range Status   02/15/2025 104 98 - 107 mmol/L Final   02/13/2025 99 98 - 107 mmol/L Final      CO2 CO2   Date Value Ref Range Status " "  02/15/2025 23.2 22.0 - 29.0 mmol/L Final   02/13/2025 22.2 22.0 - 29.0 mmol/L Final      BUN BUN   Date Value Ref Range Status   02/15/2025 19 8 - 23 mg/dL Final   02/13/2025 38 (H) 8 - 23 mg/dL Final      Creatinine Creatinine   Date Value Ref Range Status   02/15/2025 0.84 0.76 - 1.27 mg/dL Final   02/13/2025 2.23 (H) 0.76 - 1.27 mg/dL Final      Calcium Calcium   Date Value Ref Range Status   02/15/2025 9.2 8.6 - 10.5 mg/dL Final   02/13/2025 9.2 8.6 - 10.5 mg/dL Final      PO4 No results found for: \"CAPO4\"   Albumin Albumin   Date Value Ref Range Status   02/13/2025 4.0 3.5 - 5.2 g/dL Final      Magnesium Magnesium   Date Value Ref Range Status   02/15/2025 1.5 (L) 1.6 - 2.4 mg/dL Final      Uric Acid No results found for: \"URICACID\"        Results Review:     I reviewed the patient's new clinical results.    amLODIPine, 5 mg, Oral, Daily  aspirin, 81 mg, Oral, Daily  atorvastatin, 80 mg, Oral, Nightly  clopidogrel, 75 mg, Oral, Daily  insulin lispro, 2-9 Units, Subcutaneous, 4x Daily AC & at Bedtime  isosorbide mononitrate, 60 mg, Oral, Daily  [Held by provider] losartan, 50 mg, Oral, Daily  sodium chloride, 10 mL, Intravenous, Q12H           Medication Review: Reviewed    Assessment & Plan     1) RAÚL - pre-renal  2) Flu A  3) CVA  4) HTN  5) DM  6) CAD/CABG  7) HLD  8) Hypomagnesemia     PLAN: Cr improved to 0.8.  Losartan on hold.  Have room to titrate Amlodipine if needed.  Replace Mag.  Dispo per primary.  Will follow.        Demario Manley MD  [facility]  02/15/25  05:35 EST      "

## 2025-02-15 NOTE — CONSULTS
Referring Provider: Sav Ambrosio,*    Reason for Consultation: Stroke workup      Patient Care Team:  Joie Issa APRN as PCP - General (Nurse Practitioner)      SUBJECTIVE     Chief Complaint: Flulike symptoms, confusion, facial droop    History of present illness:  Luiz Connors is a 74 y.o. male  with coronary artery disease, previous CABG and multiple PCI, hypertension, hyperlipidemia, diabetes, HFrEF with EF of 30 to 35%, carotid stenosis status post right carotid endarterectomy who presented to the hospital with flulike symptoms followed by confusion and right facial droop with difficulty speaking.  MRI showed acute ischemic stroke involving left thalamus.  Cardiology has been consulted due to known coronary disease, HFrEF and medical management of cardiovascular risk factors.    Review of systems:    Constitutional: No weakness, fatigue, fever, rigors, chills   Eyes: No vision changes, eye pain   ENT/oropharynx: No difficulty swallowing, sore throat, epistaxis, changes in hearing   Cardiovascular: No chest pain, chest tightness, palpitations, paroxysmal nocturnal dyspnea, orthopnea, diaphoresis, dizziness / syncopal episode   Respiratory: No shortness of breath, dyspnea on exertion, cough, wheezing, hemoptysis   Gastrointestinal: No abdominal pain, nausea, vomiting, diarrhea, bloody stools   Genitourinary: No hematuria, dysuria   Neurological: No headache, tremors, numbness, one-sided weakness    Musculoskeletal: No cramps, myalgias, joint pain, joint swelling   Integument: No rash, edema        Personal History:      History reviewed. No pertinent past medical history.    History reviewed. No pertinent surgical history.    History reviewed. No pertinent family history.    Social History     Tobacco Use    Smoking status: Former     Types: Cigarettes     Start date: 1975    Smokeless tobacco: Never   Vaping Use    Vaping status: Never Used   Substance Use Topics    Drug use: Never         Home meds:  Prior to Admission medications    Medication Sig Start Date End Date Taking? Authorizing Provider   amLODIPine (NORVASC) 5 MG tablet Take 1 tablet by mouth Daily.   Yes Leonor Reyes MD   aspirin 81 MG EC tablet Take 1 tablet by mouth Daily.   Yes Leonor Reyes MD   clopidogrel (PLAVIX) 75 MG tablet Take 1 tablet by mouth Daily.   Yes Leonor Reyes MD   gemfibrozil (LOPID) 600 MG tablet Take 1 tablet by mouth Daily.   Yes Leonor Reyes MD   glipizide (GLUCOTROL) 5 MG tablet Take 0.5 tablets by mouth Daily.   Yes Leonor Reyes MD   isosorbide mononitrate (IMDUR) 60 MG 24 hr tablet Take 1 tablet by mouth Daily.   Yes Leonor Reyes MD   losartan (COZAAR) 50 MG tablet Take 1 tablet by mouth Daily.   Yes Leonor Reyes MD   metFORMIN ER (GLUCOPHAGE-XR) 500 MG 24 hr tablet Take 1 tablet by mouth 2 (Two) Times a Day.   Yes Leonor Reyes MD   acetaminophen (TYLENOL) 325 MG tablet Take 2 tablets by mouth Every 12 (Twelve) Hours As Needed for Mild Pain.    ProviderLeonor MD       Allergies:     Patient has no known allergies.    Scheduled Meds:amLODIPine, 5 mg, Oral, Daily  aspirin, 81 mg, Oral, Daily  atorvastatin, 80 mg, Oral, Nightly  clopidogrel, 75 mg, Oral, Daily  insulin lispro, 2-9 Units, Subcutaneous, 4x Daily AC & at Bedtime  isosorbide mononitrate, 60 mg, Oral, Daily  [Held by provider] losartan, 50 mg, Oral, Daily  sodium chloride, 10 mL, Intravenous, Q12H      Continuous Infusions:   PRN Meds:  acetaminophen **OR** acetaminophen **OR** acetaminophen    senna-docusate sodium **AND** polyethylene glycol **AND** bisacodyl **AND** bisacodyl    Calcium Replacement - Follow Nurse / BPA Driven Protocol    dextrose    dextrose    glucagon (human recombinant)    Magnesium Standard Dose Replacement - Follow Nurse / BPA Driven Protocol    melatonin    ondansetron ODT **OR** ondansetron    Phosphorus Replacement - Follow Nurse / BPA Driven  "Protocol    Potassium Replacement - Follow Nurse / BPA Driven Protocol    sodium chloride    sodium chloride      OBJECTIVE    Vital Signs  Vitals:    02/15/25 0017 02/15/25 0455 02/15/25 0825 02/15/25 1246   BP: 126/52 136/65 143/75 135/70   BP Location: Left arm Left arm Right arm Right arm   Patient Position: Lying Lying Lying Lying   Pulse: 63 53 64 69   Resp: 22  18 19   Temp:  97.7 °F (36.5 °C) 96.9 °F (36.1 °C) 97.6 °F (36.4 °C)   TempSrc:  Oral Oral Oral   SpO2: 95% 94% 94% 95%   Weight:       Height:           Flowsheet Rows      Flowsheet Row First Filed Value   Admission Height 170.2 cm (67\") Documented at 02/13/2025 0906   Admission Weight 81.6 kg (180 lb) Documented at 02/13/2025 0906              Intake/Output Summary (Last 24 hours) at 2/15/2025 1514  Last data filed at 2/14/2025 1900  Gross per 24 hour   Intake 480 ml   Output --   Net 480 ml        Telemetry: Sinus rhythm/sinus bradycardia    Physical Exam:  The patient is alert, oriented and in no distress.  Vital signs as noted above.  Head and neck revealed no carotid bruits or jugular venous distention.  No thyromegaly or lymphadenopathy is present  Lungs clear.  No wheezing.  Breath sounds are normal bilaterally.  Heart: Normal first and second heart sounds. No murmur.  No precordial rub is present.  No gallop is present.  Abdomen: Soft and nontender.  No organomegaly is present.  Extremities with good peripheral pulses without any pedal edema.  Skin: Warm and dry.  Musculoskeletal system is grossly normal.  CNS grossly normal.       Results Review:  I have personally reviewed the results from the time of this admission to 2/15/2025 15:14 EST and agree with these findings:  []  Laboratory  []  Microbiology  []  Radiology  []  EKG/Telemetry   []  Cardiology/Vascular   []  Pathology  []  Old records  []  Other:    Most notable findings include:     Lab Results (last 24 hours)       Procedure Component Value Units Date/Time    P2Y12 Platelet " Inhibition [[id number]]  (Normal) Collected: 02/15/25 1136    Specimen: Blood Updated: 02/15/25 1329     P2Y12 Platelet Inhibition 199 PRU     Narrative:      Test results are in P2Y12 reaction units (PRU). This measures the extent of platelet aggregation in the presence of P2Y12 inhibitor drugs such as clopidrogrel (Plavix), prasugrel (Effient), ticagrelor (Brilinta), and ticlopidine (Ticlid).   Pre-Drug normal reference range: 194 - 418 PRU   P2Y12 values <194 (low end of reference range) are specific evidence of a P2Y12 inhibitor effect   Patients who have been treated with Glycoprotein IIb/IIIa inhibitors should not be tested until platelet function has recovered. This time period is approximately 14 days after discontinuation of abciximab (ReoPro) and up to 48 hours after discontinuation of eptifibatide (Integrillin) and tirofiban (Aggratstat).   Results should be interpreted in conjunction with other laboratory and clinical data available to the clinician.    POC Glucose Once [[id number]]  (Abnormal) Collected: 02/15/25 1250    Specimen: Blood Updated: 02/15/25 1251     Glucose 218 mg/dL      Comment: Serial Number: [id number]:  [id number]       POC Glucose Once [[id number]]  (Abnormal) Collected: 02/15/25 0826    Specimen: Blood Updated: 02/15/25 0828     Glucose 152 mg/dL      Comment: Serial Number: [id number]:  [id number]       POC Glucose Once [[id number]]  (Abnormal) Collected: 02/15/25 0556    Specimen: Blood Updated: 02/15/25 0558     Glucose 160 mg/dL      Comment: Serial Number: [id number]:  [id number]       CBC & Differential [[id number]]  (Abnormal) Collected: 02/15/25 0319    Specimen: Blood Updated: 02/15/25 0439    Narrative:      The following orders were created for panel order CBC & Differential.  Procedure                               Abnormality         Status                     ---------                               -----------         ------                     CBC Auto  Differential[[id number]]        Abnormal            Final result               Scan Slide[[id number]]                                       Final result                 Please view results for these tests on the individual orders.    CBC Auto Differential [[id number]]  (Abnormal) Collected: 02/15/25 0319    Specimen: Blood Updated: 02/15/25 0439     WBC 7.52 10*3/mm3      RBC 4.35 10*6/mm3      Hemoglobin 12.4 g/dL      Hematocrit 38.7 %      MCV 89.0 fL      MCH 28.5 pg      MCHC 32.0 g/dL      RDW 13.0 %      RDW-SD 42.6 fl      MPV 10.6 fL      Platelets 241 10*3/mm3     Narrative:      The previously reported component NRBC is no longer being reported. Previous result was 0.0 /100 WBC (Reference Range: 0.0-0.2 /100 WBC) on 2/15/2025 at 0344 EST.    Scan Slide [[id number]] Collected: 02/15/25 0319    Specimen: Blood Updated: 02/15/25 0439     Scan Slide --     Comment: See Manual Differential Results       Manual Differential [[id number]]  (Abnormal) Collected: 02/15/25 0319    Specimen: Blood Updated: 02/15/25 0439     Neutrophil % 48.0 %      Lymphocyte % 37.0 %      Monocyte % 3.0 %      Eosinophil % 6.0 %      Bands %  4.0 %      Atypical Lymphocyte % 1.0 %      Plasma Cells % 1.0 %      Neutrophils Absolute 3.91 10*3/mm3      Lymphocytes Absolute 2.86 10*3/mm3      Monocytes Absolute 0.23 10*3/mm3      Eosinophils Absolute 0.45 10*3/mm3      RBC Morphology Normal     Large Platelets Slight/1+    Basic Metabolic Panel [[id number]]  (Abnormal) Collected: 02/15/25 0319    Specimen: Blood Updated: 02/15/25 0414     Glucose 177 mg/dL      BUN 19 mg/dL      Creatinine 0.84 mg/dL      Sodium 139 mmol/L      Potassium 4.1 mmol/L      Chloride 104 mmol/L      CO2 23.2 mmol/L      Calcium 9.2 mg/dL      BUN/Creatinine Ratio 22.6     Anion Gap 11.8 mmol/L      eGFR 90.9 mL/min/1.73     Narrative:      GFR Categories in Chronic Kidney Disease (CKD)      GFR Category          GFR (mL/min/1.73)    Interpretation  G1                      90 or greater         Normal or high (1)  G2                      60-89                Mild decrease (1)  G3a                   45-59                Mild to moderate decrease  G3b                   30-44                Moderate to severe decrease  G4                    15-29                Severe decrease  G5                    14 or less           Kidney failure          (1)In the absence of evidence of kidney disease, neither GFR category G1 or G2 fulfill the criteria for CKD.    eGFR calculation 2021 CKD-EPI creatinine equation, which does not include race as a factor    Magnesium [[id number]]  (Abnormal) Collected: 02/15/25 0319    Specimen: Blood Updated: 02/15/25 0414     Magnesium 1.5 mg/dL     Phosphorus [[id number]]  (Normal) Collected: 02/15/25 0319    Specimen: Blood Updated: 02/15/25 0409     Phosphorus 2.6 mg/dL     POC Glucose Q6H [[id number]]  (Abnormal) Collected: 02/15/25 0014    Specimen: Blood Updated: 02/15/25 0016     Glucose 195 mg/dL      Comment: Serial Number: [id number]:  [id number]       POC Glucose Once [[id number]]  (Abnormal) Collected: 02/14/25 2151    Specimen: Blood Updated: 02/14/25 2153     Glucose 221 mg/dL      Comment: Serial Number: [id number]:  [id number]       Vitamin B12 [[id number]]  (Normal) Collected: 02/13/25 0648    Specimen: Blood Updated: 02/14/25 1735     Vitamin B-12 428 pg/mL     Narrative:      Results may be falsely increased if patient taking Biotin.      Folate [[id number]]  (Normal) Collected: 02/13/25 0648    Specimen: Blood Updated: 02/14/25 1735     Folate 16.60 ng/mL     Narrative:      Results may be falsely increased if patient taking Biotin.      POC Glucose Once [[id number]]  (Abnormal) Collected: 02/14/25 1539    Specimen: Blood Updated: 02/14/25 1540     Glucose 157 mg/dL      Comment: Serial Number: [id number]:  [id number]               Imaging Results (Last 24 Hours)       Procedure Component Value Units Date/Time    MRI  Angiogram Head Without Contrast [[id number]] Collected: 02/14/25 2116     Updated: 02/14/25 2123    Narrative:      MRI ANGIOGRAM HEAD WO CONTRAST, MRI ANGIOGRAM NECK WO CONTRAST    Date of Exam: 2/14/2025 7:55 PM EST    Indication: Stroke, follow up  stroke.     Comparison: Brain MRI 2/14/2025    Technique:  Routine 3-D time-of-flight gradient echo imaging was obtained of the head and neck without contrast administration.      Findings:  MRA head:    Normal flow related signal in the [place] and its proximal branches.No appreciable focal stenosis.    No visible aneurysm. Please note that MRA is less sensitive for aneurysms less than 4 mm in size.    No arterial flow related signal in the venous system.    Axial T2 weighted sequence demonstrates edema associated with acute infarct in the anterior left thalamus and encephalomalacia in the right posterior temporal lobe related to chronic infarct.    MRA neck:    Aortic arch: Conventional, three-vessel, aortic arch.    Subclavian arteries: Not accurately evaluated due to respiratory motion artifact.    Carotid arteries: No flow-limiting stenosis. No occlusion. Less than 50% narrowing of the internal carotid arteries by NASCET criteria at their origins.    Vertebral arteries: No flow-limiting stenosis. No occlusion.      Impression:      Impression:  No proximal large vessel occlusion or severe stenosis of the included major arteries of the head and neck.        Electronically Signed: Nestor Elaine    2/14/2025 9:21 PM EST    Workstation ID: [id number]    MRI Angiogram Neck Without Contrast [[id number]] Collected: 02/14/25 2116     Updated: 02/14/25 2123    Narrative:      MRI ANGIOGRAM HEAD WO CONTRAST, MRI ANGIOGRAM NECK WO CONTRAST    Date of Exam: 2/14/2025 7:55 PM EST    Indication: Stroke, follow up  stroke.     Comparison: Brain MRI 2/14/2025    Technique:  Routine 3-D time-of-flight gradient echo imaging was obtained of the head and neck without contrast  administration.      Findings:  MRA head:    Normal flow related signal in the [place] and its proximal branches.No appreciable focal stenosis.    No visible aneurysm. Please note that MRA is less sensitive for aneurysms less than 4 mm in size.    No arterial flow related signal in the venous system.    Axial T2 weighted sequence demonstrates edema associated with acute infarct in the anterior left thalamus and encephalomalacia in the right posterior temporal lobe related to chronic infarct.    MRA neck:    Aortic arch: Conventional, three-vessel, aortic arch.    Subclavian arteries: Not accurately evaluated due to respiratory motion artifact.    Carotid arteries: No flow-limiting stenosis. No occlusion. Less than 50% narrowing of the internal carotid arteries by NASCET criteria at their origins.    Vertebral arteries: No flow-limiting stenosis. No occlusion.      Impression:      Impression:  No proximal large vessel occlusion or severe stenosis of the included major arteries of the head and neck.        Electronically Signed: Nestor Elaine    2/14/2025 9:21 PM EST    Workstation ID: [id number]            LAB RESULTS (LAST 7 DAYS)    CBC  Results from last 7 days   Lab Units 02/15/25  0319 02/13/25  0648   WBC 10*3/mm3 7.52 8.60   RBC 10*6/mm3 4.35 4.76   HEMOGLOBIN g/dL 12.4* 13.7   HEMATOCRIT % 38.7 43.9   MCV fL 89.0 92.2   PLATELETS 10*3/mm3 241 258       BMP  Results from last 7 days   Lab Units 02/15/25  0319 02/13/25  0648   SODIUM mmol/L 139 138   POTASSIUM mmol/L 4.1 4.4   CHLORIDE mmol/L 104 99   CO2 mmol/L 23.2 22.2   BUN mg/dL 19 38*   CREATININE mg/dL 0.84 2.23*   GLUCOSE mg/dL 177* 284*   MAGNESIUM mg/dL 1.5*  --    PHOSPHORUS mg/dL 2.6  --        CMP   Results from last 7 days   Lab Units 02/15/25  0319 02/13/25  0648   SODIUM mmol/L 139 138   POTASSIUM mmol/L 4.1 4.4   CHLORIDE mmol/L 104 99   CO2 mmol/L 23.2 22.2   BUN mg/dL 19 38*   CREATININE mg/dL 0.84 2.23*   GLUCOSE mg/dL 177* 284*    ALBUMIN g/dL  --  4.0   BILIRUBIN mg/dL  --  0.3   ALK PHOS U/L  --  51   AST (SGOT) U/L  --  40   ALT (SGPT) U/L  --  25   AMMONIA umol/L  --  24       BNP        TROPONIN        CoAg        Creatinine Clearance  Estimated Creatinine Clearance: 78.6 mL/min (by C-G formula based on SCr of 0.84 mg/dL).    ABG          Radiology  MRI Angiogram Head Without Contrast    Result Date: 2/14/2025  Impression: No proximal large vessel occlusion or severe stenosis of the included major arteries of the head and neck. Electronically Signed: Nestor Elaine  2/14/2025 9:21 PM EST  Workstation ID: [id number]    MRI Angiogram Neck Without Contrast    Result Date: 2/14/2025  Impression: No proximal large vessel occlusion or severe stenosis of the included major arteries of the head and neck. Electronically Signed: Nestor Elaine  2/14/2025 9:21 PM EST  Workstation ID: [id number]    MRI Brain Without Contrast    Result Date: 2/14/2025  Impression: 1. Acute infarct involving anterior left thalamus. 2. Encephalomalacia involving right posterior temporal lobe related to sequela of remote infarct. 3. Generalized cerebral atrophy with mild white matter findings of chronic microvascular disease. I called findings to CECILLE and discussed with patient's nurse Annia at 7:23 a.m. on 2/14/2025. Electronically Signed: Demetrio Hall MD  2/14/2025 7:26 AM EST  Workstation ID: [id number]     Renal Bilateral    Result Date: 2/13/2025  Impression: No hydronephrosis or sonographically evident nephrolithiasis. Electronically Signed: Nestor Elaine  2/13/2025 7:02 PM EST  Workstation ID: [id number]       EKG  I personally viewed and interpreted the patient's EKG/Telemetry data:  ECG 12 Lead Altered Mental Status   Final Result   HEART RATE=59  bpm   RR Sfloefct=8601  ms   NC Tawzkmqq=377  ms   P Horizontal Axis=38  deg   P Front Axis=54  deg   QRSD Wccsrkql=142  ms   QT Ovgumzjd=257  ms   BNaI=277  ms   QRS Axis=-32  deg   T Wave Axis=91  deg   - ABNORMAL  ECG -   Sinus bradycardia   Nonspecific  intraventricular conduction delay   No previous ECG available for comparison   Electronically Signed By: Dhaval Story (Babak) 2025-02-14 08:38:11   Date and Time of Study:2025-02-13 06:39:40      Telemetry Scan   Final Result      Telemetry Scan   Final Result      Telemetry Scan   Final Result      Telemetry Scan   Final Result      Telemetry Scan   Final Result            Echocardiogram:    Results for orders placed during the hospital encounter of 02/13/25    Adult Transthoracic Echo Complete W/ Cont if Necessary Per Protocol (With Agitated Saline)    Interpretation Summary    Left ventricular systolic function is moderately decreased. Calculated left ventricular EF = 29% Left ventricular ejection fraction appears to be 26 - 30%.    The left ventricular cavity is moderately dilated.    Left ventricular diastolic function is consistent with (grade III w/high LAP) reversible restrictive pattern.    Mildly reduced right ventricular systolic function noted.    The left atrial cavity is dilated.    Saline test results are negative for right to left atrial level shunt.    Moderate mitral valve regurgitation is present.    Estimated right ventricular systolic pressure from tricuspid regurgitation is normal (<35 mmHg).        Stress Test:        Cardiac Catheterization:  No results found for this or any previous visit.        Other:      ASSESSMENT & PLAN:    Principal Problem:    Influenza A  Active Problems:    Acute ischemic VBA thalamic stroke, left    Acute ischemic stroke    Acute ischemic stroke  Acute infarct involving anterior left thalamus  MRA with no evidence of proximal vessel occlusion.  Continue dual antiplatelet therapy, high intensity statin  MCOT at the time of discharge    Coronary artery disease  Extensive CAD with previous CABG and multiple PCI's.  Continue dual antiplatelet therapy, high intensity statin.  Not on beta-blocker due to bradycardia.  Continue Imdur  and amlodipine    Hypertension  Blood pressure is well-controlled on amlodipine and Imdur  Losartan held due to renal dysfunction  Can be restarted now    RAÚL  Presented with creatinine of 2.2.  Resolved with IV hydration.  Losartan can be restarted    HFrEF  Echocardiogram shows EF of 29%, grade 3 diastolic dysfunction with moderate mitral regurgitation.  Not on beta-blocker due to bradycardia  Continue losartan  Repeat echocardiogram once he recovers from flu  Uptitrate GDMT as outpatient: Follow-up with Dr. Samaniego    Carotid stenosis  Status post right carotid endarterectomy  Less than 50% stenosis on the left ICA continue dual antiplatelet therapy, high intensity statin    Hyperlipidemia/diabetes  , HDL 30, triglyceride 85 and total cholesterol 159.  A1c is 9.2  Needs better risk factors control  Continue high intensity statin  Add Jardiance  Continue insulin    Influenza A  Supportive management      Taz Perez MD  02/15/25  15:14 EST

## 2025-02-15 NOTE — PLAN OF CARE
Problem: Adult Inpatient Plan of Care  Goal: Plan of Care Review  Outcome: Progressing  Goal: Patient-Specific Goal (Individualized)  Outcome: Progressing  Goal: Absence of Hospital-Acquired Illness or Injury  Outcome: Progressing  Intervention: Identify and Manage Fall Risk  Recent Flowsheet Documentation  Taken 2/15/2025 1400 by Aide Heath LPN  Safety Promotion/Fall Prevention:   safety round/check completed   room organization consistent   assistive device/personal items within reach   clutter free environment maintained  Taken 2/15/2025 1215 by Aide Heath LPN  Safety Promotion/Fall Prevention:   safety round/check completed   room organization consistent   clutter free environment maintained   assistive device/personal items within reach  Taken 2/15/2025 1000 by Aide Heath LPN  Safety Promotion/Fall Prevention:   safety round/check completed   room organization consistent   assistive device/personal items within reach   clutter free environment maintained  Taken 2/15/2025 0800 by Aide Heath LPN  Safety Promotion/Fall Prevention:   safety round/check completed   room organization consistent   clutter free environment maintained   assistive device/personal items within reach  Intervention: Prevent Skin Injury  Recent Flowsheet Documentation  Taken 2/15/2025 1215 by Aide Heath LPN  Body Position: position changed independently  Skin Protection: transparent dressing maintained  Taken 2/15/2025 0800 by Aide Heath LPN  Body Position: position changed independently  Skin Protection: transparent dressing maintained  Intervention: Prevent and Manage VTE (Venous Thromboembolism) Risk  Recent Flowsheet Documentation  Taken 2/15/2025 1215 by Aide Heath LPN  VTE Prevention/Management:   bilateral   SCDs (sequential compression devices) off   patient refused intervention  Taken 2/15/2025 0800 by Aide Heath LPN  VTE Prevention/Management:   bilateral   SCDs (sequential compression devices)  off   patient refused intervention  Intervention: Prevent Infection  Recent Flowsheet Documentation  Taken 2/15/2025 1400 by Aide Heath LPN  Infection Prevention:   environmental surveillance performed   equipment surfaces disinfected   rest/sleep promoted   single patient room provided  Taken 2/15/2025 1215 by Aide Heath LPN  Infection Prevention:   environmental surveillance performed   equipment surfaces disinfected   rest/sleep promoted   single patient room provided  Taken 2/15/2025 1000 by Aide Heath LPN  Infection Prevention:   environmental surveillance performed   equipment surfaces disinfected   rest/sleep promoted   single patient room provided  Taken 2/15/2025 0800 by Aide Heath LPN  Infection Prevention:   environmental surveillance performed   equipment surfaces disinfected   rest/sleep promoted   single patient room provided  Goal: Optimal Comfort and Wellbeing  Outcome: Progressing  Intervention: Provide Person-Centered Care  Recent Flowsheet Documentation  Taken 2/15/2025 1215 by Aide Heath LPN  Trust Relationship/Rapport:   care explained   choices provided   questions encouraged  Taken 2/15/2025 0800 by Aide Heath LPN  Trust Relationship/Rapport:   care explained   choices provided   questions encouraged  Goal: Readiness for Transition of Care  Outcome: Progressing     Problem: Fall Injury Risk  Goal: Absence of Fall and Fall-Related Injury  Outcome: Progressing  Intervention: Identify and Manage Contributors  Recent Flowsheet Documentation  Taken 2/15/2025 1400 by Aide Heath LPN  Medication Review/Management: medications reviewed  Taken 2/15/2025 1215 by Aide Heath LPN  Medication Review/Management: medications reviewed  Taken 2/15/2025 1000 by Aide Heath LPN  Medication Review/Management: medications reviewed  Taken 2/15/2025 0800 by Aide Heath LPN  Medication Review/Management: medications reviewed  Intervention: Promote Injury-Free Environment  Recent  Flowsheet Documentation  Taken 2/15/2025 1400 by Aide Heath LPN  Safety Promotion/Fall Prevention:   safety round/check completed   room organization consistent   assistive device/personal items within reach   clutter free environment maintained  Taken 2/15/2025 1215 by Aide Heath LPN  Safety Promotion/Fall Prevention:   safety round/check completed   room organization consistent   clutter free environment maintained   assistive device/personal items within reach  Taken 2/15/2025 1000 by Aide Heath LPN  Safety Promotion/Fall Prevention:   safety round/check completed   room organization consistent   assistive device/personal items within reach   clutter free environment maintained  Taken 2/15/2025 0800 by Aide Heath LPN  Safety Promotion/Fall Prevention:   safety round/check completed   room organization consistent   clutter free environment maintained   assistive device/personal items within reach     Problem: Stroke, Ischemic (Includes Transient Ischemic Attack)  Goal: Optimal Coping  Outcome: Progressing  Intervention: Support Psychosocial Response to Stroke  Recent Flowsheet Documentation  Taken 2/15/2025 1215 by Aide Heath LPN  Family/Support System Care: support provided  Taken 2/15/2025 0800 by Aide Heath LPN  Family/Support System Care: support provided  Goal: Effective Bowel Elimination  Outcome: Progressing  Goal: Optimal Cerebral Tissue Perfusion  Outcome: Progressing  Intervention: Protect and Optimize Cerebral Perfusion  Recent Flowsheet Documentation  Taken 2/15/2025 1215 by Aide Heath LPN  Sensory Stimulation Regulation: care clustered  Taken 2/15/2025 0800 by Aide Heath LPN  Sensory Stimulation Regulation: care clustered  Cerebral Perfusion Promotion: blood pressure monitored  Goal: Optimal Cognitive Function  Outcome: Progressing  Intervention: Optimize Cognitive Function  Recent Flowsheet Documentation  Taken 2/15/2025 1215 by Aide Heath LPN  Sensory Stimulation  Regulation: care clustered  Reorientation Measures: clock in view  Taken 2/15/2025 0800 by Aide Heath LPN  Sensory Stimulation Regulation: care clustered  Reorientation Measures: clock in view  Goal: Improved Communication Skills  Outcome: Progressing  Intervention: Optimize Communication Skills  Recent Flowsheet Documentation  Taken 2/15/2025 1215 by Aide Heath LPN  Communication Enhancement Strategies:   call light answered in person   communication board used  Taken 2/15/2025 0800 by Aide Heath LPN  Communication Enhancement Strategies:   call light answered in person   communication board used  Goal: Optimal Functional Ability  Outcome: Progressing  Goal: Optimal Nutrition Intake  Outcome: Progressing  Goal: Effective Oxygenation and Ventilation  Outcome: Progressing  Intervention: Optimize Oxygenation and Ventilation  Recent Flowsheet Documentation  Taken 2/15/2025 1215 by Aide Heath LPN  Head of Bed (HOB) Positioning: HOB elevated  Taken 2/15/2025 0800 by Aide Heath LPN  Head of Bed (HOB) Positioning: HOB elevated  Goal: Improved Sensorimotor Function  Outcome: Progressing  Intervention: Optimize Range of Motion, Motor Control and Function  Recent Flowsheet Documentation  Taken 2/15/2025 1215 by Aide Heath LPN  Positioning/Transfer Devices:   pillows   in use  Taken 2/15/2025 0800 by Aide Heath LPN  Positioning/Transfer Devices: pillows  Intervention: Optimize Sensory and Perceptual Ability  Recent Flowsheet Documentation  Taken 2/15/2025 1215 by Aide Heath LPN  Pressure Reduction Techniques: frequent weight shift encouraged  Pressure Reduction Devices: pressure-redistributing mattress utilized  Taken 2/15/2025 0800 by Aide Heath LPN  Pressure Reduction Techniques: frequent weight shift encouraged  Pressure Reduction Devices: pressure-redistributing mattress utilized  Goal: Safe and Effective Swallow  Outcome: Progressing  Goal: Effective Urinary Elimination  Outcome:  Progressing     Problem: Comorbidity Management  Goal: Blood Glucose Level Within Target Range  Outcome: Progressing  Intervention: Monitor and Manage Glycemia  Recent Flowsheet Documentation  Taken 2/15/2025 1400 by Aide Heath LPN  Medication Review/Management: medications reviewed  Taken 2/15/2025 1215 by Aide Heath LPN  Medication Review/Management: medications reviewed  Taken 2/15/2025 1000 by Aide Heath LPN  Medication Review/Management: medications reviewed  Taken 2/15/2025 0800 by Aide Heath LPN  Medication Review/Management: medications reviewed  Goal: Blood Pressure in Desired Range  Outcome: Progressing  Intervention: Maintain Blood Pressure Management  Recent Flowsheet Documentation  Taken 2/15/2025 1400 by Aide Heath LPN  Medication Review/Management: medications reviewed  Taken 2/15/2025 1215 by Aide Heath LPN  Medication Review/Management: medications reviewed  Taken 2/15/2025 1000 by Aide Heath LPN  Medication Review/Management: medications reviewed  Taken 2/15/2025 0800 by Aide Heath LPN  Medication Review/Management: medications reviewed   Goal Outcome Evaluation:

## 2025-02-15 NOTE — PROGRESS NOTES
[facility] MEDICINE SERVICE  DAILY PROGRESS NOTE    NAME: Luiz Connors  : 1950  MRN: [id number]      LOS: 2 days     PROVIDER OF SERVICE: Sav Ambrosio MD    Chief Complaint: Influenza A    Subjective:     Interval History:  History taken from: Patient and patient's chart     Denies any new complaints         Review of Systems:   Review of Systems  All negative except above  Objective:     Vital Signs  Temp:  [96.9 °F (36.1 °C)-98 °F (36.7 °C)] 97.6 °F (36.4 °C)  Heart Rate:  [53-81] 69  Resp:  [13-22] 19  BP: (126-155)/(52-77) 135/70   Body mass index is 28.82 kg/m².    Physical Exam  Physical Exam  General: Alert and oriented, no acute distress.  Lungs: Clear to auscultation, nonlabored respiration.  Heart: RRR, + murmur  Abdomen: Soft, nontender, nondistended, + bowel sounds.  Musculoskeletal: Normal range of motion and strength, no tenderness or swelling.  Neuro: alert and awake, moving all 4 extremities   Skin: Skin is warm, dry and pink, no rashes or lesions.  Psychiatric: Cooperative, appropriate mood and affect.         Diagnostic Data    Results from last 7 days   Lab Units 02/15/25  0319 25  0648   WBC 10*3/mm3 7.52 8.60   HEMOGLOBIN g/dL 12.4* 13.7   HEMATOCRIT % 38.7 43.9   PLATELETS 10*3/mm3 241 258   GLUCOSE mg/dL 177* 284*   CREATININE mg/dL 0.84 2.23*   BUN mg/dL 19 38*   SODIUM mmol/L 139 138   POTASSIUM mmol/L 4.1 4.4   AST (SGOT) U/L  --  40   ALT (SGPT) U/L  --  25   ALK PHOS U/L  --  51   BILIRUBIN mg/dL  --  0.3   ANION GAP mmol/L 11.8 16.8*       MRI Angiogram Head Without Contrast    Result Date: 2025  Impression: No proximal large vessel occlusion or severe stenosis of the included major arteries of the head and neck. Electronically Signed: Nestor Elaine  2025 9:21 PM EST  Workstation ID: [id number]    MRI Angiogram Neck Without Contrast    Result Date: 2025  Impression: No proximal large vessel occlusion or severe stenosis of the included  major arteries of the head and neck. Electronically Signed: Nestor Elaine  2/14/2025 9:21 PM EST  Workstation ID: [id number]    MRI Brain Without Contrast    Result Date: 2/14/2025  Impression: 1. Acute infarct involving anterior left thalamus. 2. Encephalomalacia involving right posterior temporal lobe related to sequela of remote infarct. 3. Generalized cerebral atrophy with mild white matter findings of chronic microvascular disease. I called findings to [facility] and discussed with patient's nurse Annia at 7:23 a.m. on 2/14/2025. Electronically Signed: Demetrio Hall MD  2/14/2025 7:26 AM EST  Workstation ID: [id number]     Renal Bilateral    Result Date: 2/13/2025  Impression: No hydronephrosis or sonographically evident nephrolithiasis. Electronically Signed: Nestor Elaine  2/13/2025 7:02 PM EST  Workstation ID: [id number]       I have reviewed patient labs and imaging     Assessment/Plan:     Active and Resolved Problems  Altered mental status  Acute left anterior thalamus infarct   improved, baseline A&O x 4  MRI brain showed acute left anterior thalamic infarct   CTH with remote right posterior temporal infarct with encephalomalacia  Neurochecks, NIHSS  Neuro recommendations appreciated, continue DAPT+Statin  Fall precautions     Influenza A  Unvaccinated  CXR with no acute findings, no concern for superimposed bacterial pneumonia  Not on supplemental oxygen  Symptomatic management     Acute kidney injury  improved, previous baseline 0.8-0.9  Avoid nephrotoxic medications, holding losartan  Continue to monitor BMP  Nephrology recommendations appreciated      CAD status post CABG  Nonischemic cardiomyopathy  Hypertension  Hyperlipidemia  Holding losartan in the setting of RAÚL  Continue amlodipine, Imdur  Continue ASA, Plavix  ECHO EF: 30%  Discussed with wife, last EF:35% in sept 2024  ?follows with Dr. Samaniego  Cardiology consulted      Type 2 diabetes mellitus  Mod SSI, hold glipizide  Consistent carb  diet  Hypoglycemia protocol           VTE Prophylaxis:  Mechanical VTE prophylaxis orders are present.             Disposition Planning:     Barriers to Discharge:medical clearance   Anticipated Date of Discharge: 02/16  Place of Discharge: home      Time: 40 minutes     Code Status and Medical Interventions: CPR (Attempt to Resuscitate); Full Support   Ordered at: 02/13/25 0912     Code Status (Patient has no pulse and is not breathing):    CPR (Attempt to Resuscitate)     Medical Interventions (Patient has pulse or is breathing):    Full Support       Signature: Electronically signed by Sav Ambrosio MD, 02/15/25, 15:04 EST.  [facility] Hospitalist Team

## 2025-02-15 NOTE — PROGRESS NOTES
LOS: 2 days     Chief Complaint:  confusion, right facial droop, difficulty speaking        SUBJECTIVE:    History taken from: patient chart family RN    Interval History: Luiz Connors was admitted on 2/13/2025 with flulike symptoms. Patient endorses symptoms of fatigue cough and congestion for the past 2 days. There are reports of patient going to work today in which he started develop confusion as well as possible right facial droop and difficulty speaking. However per EMS reports, symptoms not present on their evaluation. Patient currently denies headache, vision changes, speech changes, unilateral weakness/paresthesias. Denies history of CVA. Patient reports being unvaccinated for influenza this year.     2/14/25: MRI brain showed an acute ischemic stroke involving the left thalamus. Pt states he feels better today. Denies any neuro complaints. Pt states he missed a few days of medication, including ASA and Plavix, due to feeling bad.     - Portions of the above HPI were copied from previous encounters and edited as appropriate.    2/15/25: Pt is awake and alert, wife present. He appears comfortable. No new deficits.     Patient Complaints: Pt denies any complaints currently      Review of Systems   Constitutional:  Negative for chills, diaphoresis and fatigue.   Eyes:  Negative for photophobia and visual disturbance.   Neurological:  Positive for weakness. Negative for dizziness, tremors, seizures, syncope, facial asymmetry, speech difficulty, light-headedness, numbness and headaches.          Pertinent PMH:  has no past medical history on file.   ________________________________________________     OBJECTIVE:  Pt evaluated at . Wife present.     On exam:  GENERAL: NAD, awake and alert  HEENT: Normocephalic, Atraumatic. Oral mucosa clear and moist.   RESP: Breathing unlabored, breath sounds normal  CARDIO: RRR  SKIN: Warm, dry. No apparent rash.   PSYCH: Mood/affect normal    NEURO:  Oriented x3  EOMI,  PERRL, no visual field deficits  No facial asymmetry  Speech clear without dysarthria  Sensations intact and equal bilaterally  Strength: RUE 5-/5 with drift, LUE 5/5, RLE 5/5, LLE 5/5.  No ataxia      NIH Stroke Scale  Interval: baseline  1a. Level of Consciousness: 0-->Alert, keenly responsive  1b. LOC Questions: 0-->Answers both questions correctly  1c. LOC Commands: 0-->Performs both tasks correctly  2. Best Gaze: 0-->Normal  3. Visual: 0-->No visual loss  4. Facial Palsy: 0-->Normal symmetrical movements  5a. Motor Arm, Left: 0-->No drift, limb holds 90 (or 45) degrees for full 10 secs  5b. Motor Arm, Right: 1-->Drift, limb holds 90 (or 45) degrees, but drifts down before full 10 secs, does not hit bed or other support  6a. Motor Leg, Left: 0-->No drift, leg holds 30 degree position for full 5 secs  6b. Motor Leg, Right: 0-->No drift, leg holds 30 degree position for full 5 secs  7. Limb Ataxia: 0-->Absent  8. Sensory: 0-->Normal, no sensory loss  9. Best Language: 0-->No aphasia, normal  10. Dysarthria: 0-->Normal  11. Extinction and Inattention (formerly Neglect): 0-->No abnormality  Total (NIH Stroke Scale): 1         ________________________________________________   RESULTS REVIEW    VITAL SIGNS:  Temp:  [96.9 °F (36.1 °C)-98 °F (36.7 °C)] 97.6 °F (36.4 °C)  Heart Rate:  [53-81] 69  Resp:  [13-22] 19  BP: (126-155)/(52-77) 135/70    LABS:       Lab 02/15/25  0319 02/13/25  0648   WBC 7.52 8.60   HEMOGLOBIN 12.4* 13.7   HEMATOCRIT 38.7 43.9   PLATELETS 241 258   NEUTROS ABS 3.91 6.32   IMMATURE GRANS (ABS)  --  0.03   LYMPHS ABS  --  1.44   MONOS ABS  --  0.76   EOS ABS 0.45* 0.02   MCV 89.0 92.2   PROCALCITONIN  --  0.29*         Lab 02/15/25  0319 02/14/25  1151 02/13/25  0648   SODIUM 139  --  138   POTASSIUM 4.1  --  4.4   CHLORIDE 104  --  99   CO2 23.2  --  22.2   ANION GAP 11.8  --  16.8*   BUN 19  --  38*   CREATININE 0.84  --  2.23*   EGFR 90.9  --  30.0*   GLUCOSE 177*  --  284*   CALCIUM 9.2  --   9.2   MAGNESIUM 1.5*  --   --    PHOSPHORUS 2.6  --   --    HEMOGLOBIN A1C  --  9.22*  --    TSH  --   --  4.610*         Lab 02/13/25  0648   TOTAL PROTEIN 7.9   ALBUMIN 4.0   GLOBULIN 3.9   ALT (SGPT) 25   AST (SGOT) 40   BILIRUBIN 0.3   ALK PHOS 51             Lab 02/13/25  0648   CHOLESTEROL 159   LDL CHOL 113*   HDL CHOL 30*   TRIGLYCERIDES 85         Lab 02/13/25  0648   FOLATE 16.60   VITAMIN B 12 428         UA          2/13/2025    08:06   Urinalysis   Squamous Epithelial Cells, UA 3-6    Specific Gravity, UA 1.026    Ketones, UA Trace    Blood, UA Negative    Leukocytes, UA Negative    Nitrite, UA Negative    RBC, UA 0-2    WBC, UA 0-2    Bacteria, UA 2+        Lab Results   Component Value Date    TSH 4.610 (H) 02/13/2025     (H) 02/13/2025    HGBA1C 9.22 (H) 02/14/2025    [id number] 428 02/13/2025         IMAGING STUDIES:  MRI Angiogram Head Without Contrast    Result Date: 2/14/2025  Impression: No proximal large vessel occlusion or severe stenosis of the included major arteries of the head and neck. Electronically Signed: Nestor Elaine  2/14/2025 9:21 PM EST  Workstation ID: [id number]    MRI Angiogram Neck Without Contrast    Result Date: 2/14/2025  Impression: No proximal large vessel occlusion or severe stenosis of the included major arteries of the head and neck. Electronically Signed: Nestor Elaine  2/14/2025 9:21 PM EST  Workstation ID: [id number]    MRI Brain Without Contrast    Result Date: 2/14/2025  Impression: 1. Acute infarct involving anterior left thalamus. 2. Encephalomalacia involving right posterior temporal lobe related to sequela of remote infarct. 3. Generalized cerebral atrophy with mild white matter findings of chronic microvascular disease. I called findings to [facility] and discussed with patient's nurse Annia at 7:23 a.m. on 2/14/2025. Electronically Signed: Demetrio Hall MD  2/14/2025 7:26 AM EST  Workstation ID: [id number]    US Renal Bilateral    Result Date:  2/13/2025  Impression: No hydronephrosis or sonographically evident nephrolithiasis. Electronically Signed: Nestor Elaine  2/13/2025 7:02 PM EST  Workstation ID: [id number]     I reviewed the patient's new clinical results.    ________________________________________________      PROBLEM LIST:    Influenza A    Acute ischemic VBA thalamic stroke, left    Acute ischemic stroke        ASSESSMENT/PLAN:  1. Acute ischemic stroke involving the left thalamus. Likely small vessel disease second to age and other risk factors including HTN, HLD, DM, and acute infection with dehydration/RAÚL. LKW is unclear.   - MRI brain: Acute infarct involving anterior left thalamus. Encephalomalacia involving right posterior temporal lobe related to sequela of remote infarct. Generalized cerebral atrophy with mild white matter findings of chronic microvascular disease.  - MRA head and neck w/o contrast: No proximal large vessel occlusion or severe stenosis of the included major arteries of the head and neck   - Echo: EF 26 - 30%. LV cavity is moderately dilated. LA cavity is dilated. Saline test results are negative for right to left atrial level shunt.  - Labs: A1C: 9.22, B12: 428, LDL: 113, TSH: 4.61, ammonia: 24, P2y12: P  - Antithrombotics: Continue ASA and Plavix for now. Of note, pt did miss a few doses of his medications due to feeling poorly. Will check P2Y12 tomorrow. If poor responder to Plavix, will change to Brilinta and ASA.  - Statin: Lipitor 80mg qhs   - Recommend MCOT at d/c  - Wife provided prior echo results showing EF 35%. Pt follows with Dr. Samaniego and will make an apt with him to discuss new echo changes.      Will sign off. Please call with questions or concerns.       **Please refer to previous notes for further details and recommendations.     I discussed the patient's findings and my recommendations with patient, family, nursing staff, primary care team, and consulting provider    Maira Aguilar  GINO  02/15/25  13:21 EST

## 2025-02-15 NOTE — PLAN OF CARE
Problem: Adult Inpatient Plan of Care  Goal: Plan of Care Review  Outcome: Progressing  Goal: Patient-Specific Goal (Individualized)  Outcome: Progressing  Goal: Absence of Hospital-Acquired Illness or Injury  Outcome: Progressing  Intervention: Identify and Manage Fall Risk  Recent Flowsheet Documentation  Taken 2/15/2025 0439 by Iris Greene LPN  Safety Promotion/Fall Prevention: safety round/check completed  Taken 2/15/2025 0200 by Iris Greene LPN  Safety Promotion/Fall Prevention: safety round/check completed  Taken 2/15/2025 0019 by Iris Greene LPN  Safety Promotion/Fall Prevention: safety round/check completed  Taken 2/14/2025 2200 by Iris Greene LPN  Safety Promotion/Fall Prevention: safety round/check completed  Taken 2/14/2025 1937 by Iris Greene LPN  Safety Promotion/Fall Prevention: safety round/check completed  Intervention: Prevent Skin Injury  Recent Flowsheet Documentation  Taken 2/14/2025 1937 by Iris Greene LPN  Body Position: position changed independently  Skin Protection: transparent dressing maintained  Intervention: Prevent and Manage VTE (Venous Thromboembolism) Risk  Recent Flowsheet Documentation  Taken 2/14/2025 1937 by Iris Greene LPN  VTE Prevention/Management:   bilateral   SCDs (sequential compression devices) off   patient refused intervention  Intervention: Prevent Infection  Recent Flowsheet Documentation  Taken 2/15/2025 0439 by Iris Greene LPN  Infection Prevention:   environmental surveillance performed   equipment surfaces disinfected  Taken 2/15/2025 0200 by Iris Greene LPN  Infection Prevention:   environmental surveillance performed   equipment surfaces disinfected  Taken 2/15/2025 0019 by Iris Greene LPN  Infection Prevention:   environmental surveillance performed   equipment surfaces disinfected  Taken 2/14/2025 2200 by Iris Greene LPN  Infection Prevention:   environmental surveillance performed   equipment surfaces  disinfected  Taken 2/14/2025 1937 by Iris Greene LPN  Infection Prevention:   environmental surveillance performed   equipment surfaces disinfected  Goal: Optimal Comfort and Wellbeing  Outcome: Progressing  Intervention: Provide Person-Centered Care  Recent Flowsheet Documentation  Taken 2/14/2025 1937 by Iris Greene LPN  Trust Relationship/Rapport:   thoughts/feelings acknowledged   questions encouraged   questions answered   emotional support provided   choices provided  Goal: Readiness for Transition of Care  Outcome: Progressing     Problem: Fall Injury Risk  Goal: Absence of Fall and Fall-Related Injury  Outcome: Progressing  Intervention: Identify and Manage Contributors  Recent Flowsheet Documentation  Taken 2/15/2025 0439 by Iris Greene LPN  Medication Review/Management: medications reviewed  Taken 2/15/2025 0200 by Iris Greene LPN  Medication Review/Management: medications reviewed  Taken 2/15/2025 0019 by Iris Greene LPN  Medication Review/Management: medications reviewed  Taken 2/14/2025 2200 by Iris Greene LPN  Medication Review/Management: medications reviewed  Taken 2/14/2025 1937 by Iris Greene LPN  Medication Review/Management: medications reviewed  Intervention: Promote Injury-Free Environment  Recent Flowsheet Documentation  Taken 2/15/2025 0439 by Iris Greene LPN  Safety Promotion/Fall Prevention: safety round/check completed  Taken 2/15/2025 0200 by Iris Greene LPN  Safety Promotion/Fall Prevention: safety round/check completed  Taken 2/15/2025 0019 by Iris Greene LPN  Safety Promotion/Fall Prevention: safety round/check completed  Taken 2/14/2025 2200 by Iris Greene LPN  Safety Promotion/Fall Prevention: safety round/check completed  Taken 2/14/2025 1937 by Iris Greene LPN  Safety Promotion/Fall Prevention: safety round/check completed     Problem: Stroke, Ischemic (Includes Transient Ischemic Attack)  Goal: Optimal Coping  Outcome:  Progressing  Intervention: Support Psychosocial Response to Stroke  Recent Flowsheet Documentation  Taken 2/14/2025 1937 by Iris Greene LPN  Family/Support System Care: self-care encouraged  Goal: Effective Bowel Elimination  Outcome: Progressing  Goal: Optimal Cerebral Tissue Perfusion  Outcome: Progressing  Goal: Optimal Cognitive Function  Outcome: Progressing  Goal: Improved Communication Skills  Outcome: Progressing  Goal: Optimal Functional Ability  Outcome: Progressing  Intervention: Optimize Functional Ability  Recent Flowsheet Documentation  Taken 2/14/2025 1937 by Iris Greene LPN  Activity Management: activity encouraged  Adaptive Equipment Use: used independently  Goal: Optimal Nutrition Intake  Outcome: Progressing  Goal: Effective Oxygenation and Ventilation  Outcome: Progressing  Intervention: Optimize Oxygenation and Ventilation  Recent Flowsheet Documentation  Taken 2/14/2025 1937 by Iris Greene LPN  Head of Bed (HOB) Positioning:   HOB lowered   HOB at 30-45 degrees  Goal: Improved Sensorimotor Function  Outcome: Progressing  Intervention: Optimize Range of Motion, Motor Control and Function  Recent Flowsheet Documentation  Taken 2/14/2025 1937 by Iris Greene LPN  Positioning/Transfer Devices:   pillows   in use  Range of Motion: ROM (range of motion) performed  Intervention: Optimize Sensory and Perceptual Ability  Recent Flowsheet Documentation  Taken 2/14/2025 1937 by Iris Greene LPN  Pressure Reduction Techniques: frequent weight shift encouraged  Pressure Reduction Devices: pressure-redistributing mattress utilized  Goal: Safe and Effective Swallow  Outcome: Progressing  Goal: Effective Urinary Elimination  Outcome: Progressing     Problem: Comorbidity Management  Goal: Blood Glucose Level Within Target Range  Outcome: Progressing  Intervention: Monitor and Manage Glycemia  Recent Flowsheet Documentation  Taken 2/15/2025 0439 by Iris Greene LPN  Medication  Review/Management: medications reviewed  Taken 2/15/2025 0200 by Iris Greene LPN  Medication Review/Management: medications reviewed  Taken 2/15/2025 0019 by Iris Greene LPN  Medication Review/Management: medications reviewed  Taken 2/14/2025 2200 by Iris Greene LPN  Medication Review/Management: medications reviewed  Taken 2/14/2025 1937 by Iris Greene LPN  Medication Review/Management: medications reviewed  Goal: Blood Pressure in Desired Range  Outcome: Progressing  Intervention: Maintain Blood Pressure Management  Recent Flowsheet Documentation  Taken 2/15/2025 0439 by Iris Greene LPN  Medication Review/Management: medications reviewed  Taken 2/15/2025 0200 by Iris Greene LPN  Medication Review/Management: medications reviewed  Taken 2/15/2025 0019 by Iris Greene LPN  Medication Review/Management: medications reviewed  Taken 2/14/2025 2200 by Iris Greene LPN  Medication Review/Management: medications reviewed  Taken 2/14/2025 1937 by Iris Greene LPN  Medication Review/Management: medications reviewed   Goal Outcome Evaluation:

## 2025-02-16 ENCOUNTER — APPOINTMENT (OUTPATIENT)
Dept: RESPIRATORY THERAPY | Facility: HOSPITAL | Age: 75
DRG: 065 | End: 2025-02-16
Payer: MEDICARE

## 2025-02-16 ENCOUNTER — READMISSION MANAGEMENT (OUTPATIENT)
Dept: CALL CENTER | Facility: HOSPITAL | Age: 75
End: 2025-02-16
Payer: MEDICARE

## 2025-02-16 VITALS
OXYGEN SATURATION: 97 % | HEIGHT: 67 IN | DIASTOLIC BLOOD PRESSURE: 62 MMHG | BODY MASS INDEX: 28.89 KG/M2 | TEMPERATURE: 98.7 F | RESPIRATION RATE: 28 BRPM | WEIGHT: 184.08 LBS | SYSTOLIC BLOOD PRESSURE: 133 MMHG | HEART RATE: 63 BPM

## 2025-02-16 LAB
ANION GAP SERPL CALCULATED.3IONS-SCNC: 10.7 MMOL/L (ref 5–15)
BUN SERPL-MCNC: 14 MG/DL (ref 8–23)
BUN/CREAT SERPL: 17.7 (ref 7–25)
CALCIUM SPEC-SCNC: 9.2 MG/DL (ref 8.6–10.5)
CHLORIDE SERPL-SCNC: 102 MMOL/L (ref 98–107)
CO2 SERPL-SCNC: 24.3 MMOL/L (ref 22–29)
CREAT SERPL-MCNC: 0.79 MG/DL (ref 0.76–1.27)
DEPRECATED RDW RBC AUTO: 42.7 FL (ref 37–54)
EGFRCR SERPLBLD CKD-EPI 2021: 92.6 ML/MIN/1.73
EOSINOPHIL # BLD MANUAL: 0.28 10*3/MM3 (ref 0–0.4)
EOSINOPHIL NFR BLD MANUAL: 3 % (ref 0.3–6.2)
ERYTHROCYTE [DISTWIDTH] IN BLOOD BY AUTOMATED COUNT: 13 % (ref 12.3–15.4)
GLUCOSE BLDC GLUCOMTR-MCNC: 137 MG/DL (ref 70–105)
GLUCOSE BLDC GLUCOMTR-MCNC: 224 MG/DL (ref 70–105)
GLUCOSE SERPL-MCNC: 133 MG/DL (ref 65–99)
HCT VFR BLD AUTO: 38.9 % (ref 37.5–51)
HGB BLD-MCNC: 12.6 G/DL (ref 13–17.7)
LYMPHOCYTES # BLD MANUAL: 2.44 10*3/MM3 (ref 0.7–3.1)
LYMPHOCYTES NFR BLD MANUAL: 10 % (ref 5–12)
MCH RBC QN AUTO: 28.8 PG (ref 26.6–33)
MCHC RBC AUTO-ENTMCNC: 32.4 G/DL (ref 31.5–35.7)
MCV RBC AUTO: 88.8 FL (ref 79–97)
MONOCYTES # BLD: 0.94 10*3/MM3 (ref 0.1–0.9)
NEUTROPHILS # BLD AUTO: 5.73 10*3/MM3 (ref 1.7–7)
NEUTROPHILS NFR BLD MANUAL: 55 % (ref 42.7–76)
NEUTS BAND NFR BLD MANUAL: 6 % (ref 0–5)
PLAT MORPH BLD: NORMAL
PLATELET # BLD AUTO: 237 10*3/MM3 (ref 140–450)
PMV BLD AUTO: 10.7 FL (ref 6–12)
POTASSIUM SERPL-SCNC: 3.9 MMOL/L (ref 3.5–5.2)
RBC # BLD AUTO: 4.38 10*6/MM3 (ref 4.14–5.8)
RBC MORPH BLD: NORMAL
SCAN SLIDE: NORMAL
SODIUM SERPL-SCNC: 137 MMOL/L (ref 136–145)
VARIANT LYMPHS NFR BLD MANUAL: 26 % (ref 19.6–45.3)
WBC MORPH BLD: NORMAL
WBC NRBC COR # BLD AUTO: 9.39 10*3/MM3 (ref 3.4–10.8)

## 2025-02-16 PROCEDURE — 82948 REAGENT STRIP/BLOOD GLUCOSE: CPT

## 2025-02-16 PROCEDURE — 85007 BL SMEAR W/DIFF WBC COUNT: CPT | Performed by: STUDENT IN AN ORGANIZED HEALTH CARE EDUCATION/TRAINING PROGRAM

## 2025-02-16 PROCEDURE — 99232 SBSQ HOSP IP/OBS MODERATE 35: CPT | Performed by: INTERNAL MEDICINE

## 2025-02-16 PROCEDURE — 85025 COMPLETE CBC W/AUTO DIFF WBC: CPT | Performed by: STUDENT IN AN ORGANIZED HEALTH CARE EDUCATION/TRAINING PROGRAM

## 2025-02-16 PROCEDURE — 80048 BASIC METABOLIC PNL TOTAL CA: CPT | Performed by: INTERNAL MEDICINE

## 2025-02-16 RX ORDER — ATORVASTATIN CALCIUM 80 MG/1
80 TABLET, FILM COATED ORAL NIGHTLY
Qty: 90 TABLET | Refills: 0 | Status: SHIPPED | OUTPATIENT
Start: 2025-02-16 | End: 2025-02-16

## 2025-02-16 RX ORDER — CLOPIDOGREL BISULFATE 75 MG/1
75 TABLET ORAL DAILY
Qty: 30 TABLET | Refills: 0 | Status: SHIPPED | OUTPATIENT
Start: 2025-02-16

## 2025-02-16 RX ORDER — ASPIRIN 81 MG/1
81 TABLET ORAL DAILY
Qty: 30 TABLET | Refills: 0 | Status: SHIPPED | OUTPATIENT
Start: 2025-02-16 | End: 2025-03-18

## 2025-02-16 RX ORDER — LOSARTAN POTASSIUM 25 MG/1
25 TABLET ORAL DAILY
Qty: 30 TABLET | Refills: 0 | Status: SHIPPED | OUTPATIENT
Start: 2025-02-16 | End: 2025-02-16 | Stop reason: HOSPADM

## 2025-02-16 RX ORDER — ATORVASTATIN CALCIUM 80 MG/1
80 TABLET, FILM COATED ORAL NIGHTLY
Qty: 90 TABLET | Refills: 0 | Status: SHIPPED | OUTPATIENT
Start: 2025-02-16 | End: 2025-02-24 | Stop reason: SDUPTHER

## 2025-02-16 RX ADMIN — ISOSORBIDE MONONITRATE 60 MG: 60 TABLET, EXTENDED RELEASE ORAL at 09:08

## 2025-02-16 RX ADMIN — ASPIRIN 81 MG: 81 TABLET, COATED ORAL at 09:08

## 2025-02-16 RX ADMIN — AMLODIPINE BESYLATE 5 MG: 5 TABLET ORAL at 09:08

## 2025-02-16 RX ADMIN — CLOPIDOGREL BISULFATE 75 MG: 75 TABLET ORAL at 09:08

## 2025-02-16 RX ADMIN — Medication 10 ML: at 09:08

## 2025-02-16 NOTE — DISCHARGE SUMMARY
"[facility] Medicine Services  Discharge Summary    Date of Service: 2025  Patient Name: Luiz Connors  : 1950  MRN: [id number]    Date of Admission: 2025  Discharge Diagnosis: Influenza A, Acute left anterior thalamus infarct   Date of Discharge: 2025  Primary Care Physician: Joie Issa APRN      Presenting Problem:   Influenza A [J10.1]  RAÚL (acute kidney injury) [N17.9]  Altered mental status, unspecified altered mental status type [R41.82]  Acute ischemic stroke [I63.9]    Active and Resolved Hospital Problems:  Active Hospital Problems    Diagnosis POA    **Influenza A [J10.1] Yes    Acute ischemic VBA thalamic stroke, left [I63.81] Yes    Acute ischemic stroke [I63.9] Yes      Resolved Hospital Problems   No resolved problems to display.         Hospital Course     HPI:    \"Luiz Connors is a 74 y.o. male with a CMH of CAD with CABG, nonischemic cardiomyopathy,, HLD, HTN, diabetes mellitus who presented to [facility] on 2025 with flulike symptoms.  Patient endorses symptoms of fatigue cough and congestion for the past 2 days.  There are reports of patient going to work today in which he started develop confusion as well as possible right facial droop and difficulty speaking.  However per EMS reports, symptoms not present on their evaluation.  Patient currently denies headache, vision changes, speech changes, unilateral weakness/paresthesias.  Denies history of CVA.  Patient reports being unvaccinated for influenza this year.     On ED evaluation, patient with no focal deficits or aphasia.  Patient was noted to be hypotensive on arrival with blood pressure of 88/66.  Patient was given 1 L IV fluids with improvement of blood pressure.  Labs are pertinent for creatinine 2.23, BUN 38, glucose 284. CBC was unremarkable.  Procalcitonin was noted to be 0.29.  Ammonia was 24.  UA not concerning for UTI.  Respiratory panel was positive for influenza A.  " "Chest x-ray with no acute findings.  CT head with remote right posterior infarct as well as C3 screw that appears to be fractured otherwise no acute findings.  Hospital service to admit for further evaluation and management.\"    Hospital Course:  Altered mental status  Acute left anterior thalamus infarct   improved, baseline A&O x 4  MRI brain showed acute left anterior thalamic infarct   CTH with remote right posterior temporal infarct with encephalomalacia  Neurochecks, NIHSS  Neuro recommendations appreciated, continue DAPT+Statin  Fall precautions     Influenza A  Unvaccinated  CXR with no acute findings, no concern for superimposed bacterial pneumonia  Not on supplemental oxygen  Symptomatic management     Acute kidney injury  improved, previous baseline 0.8-0.9  Avoid nephrotoxic medications, holding losartan  Continue to monitor BMP  Nephrology recommendations appreciated      CAD status post CABG  Nonischemic cardiomyopathy  Hypertension  Hyperlipidemia  Holding losartan in the setting of RAÚL  Continue amlodipine, Imdur  Continue ASA, Plavix  ECHO EF: 30%  Discussed with wife, last EF:35% in sept 2024  Cardiology recommendations appreciated- outpatient TIFFANIE  MCOT at discharge  Follow up with Dr. Samaniego as outpatient      Type 2 diabetes mellitus  Mod SSI, hold glipizide  Consistent carb diet  Hypoglycemia protocol   Resume home medications at discharge            DISCHARGE Follow Up Recommendations for labs and diagnostics:   Follow up with primary care provider within 3 days of this discharge.   Follow up with Neurology and cardiology as outpatient.  Patient will need TIFFANIE as outpatient.  Monitor BMP as outpatient.  Losartan 50 mg held because of RAÚL and BP trend while inpatient. Please, monitor BP and resume meds as appropriate.         Day of Discharge     Vital Signs:  Temp:  [98 °F (36.7 °C)-99.8 °F (37.7 °C)] 98.7 °F (37.1 °C)  Heart Rate:  [49-82] 63  Resp:  [19-28] 28  BP: (103-139)/(42-62) " 133/62  Flow (L/min) (Oxygen Therapy):  [2] 2          Pertinent  and/or Most Recent Results     LAB RESULTS:      Lab 02/16/25  0330 02/15/25  0319 02/13/25  0648   WBC 9.39 7.52 8.60   HEMOGLOBIN 12.6* 12.4* 13.7   HEMATOCRIT 38.9 38.7 43.9   PLATELETS 237 241 258   NEUTROS ABS 5.73 3.91 6.32   IMMATURE GRANS (ABS)  --   --  0.03   LYMPHS ABS  --   --  1.44   MONOS ABS  --   --  0.76   EOS ABS 0.28 0.45* 0.02   MCV 88.8 89.0 92.2   PROCALCITONIN  --   --  0.29*         Lab 02/16/25  0330 02/15/25  0319 02/14/25  1151 02/13/25  0648   SODIUM 137 139  --  138   POTASSIUM 3.9 4.1  --  4.4   CHLORIDE 102 104  --  99   CO2 24.3 23.2  --  22.2   ANION GAP 10.7 11.8  --  16.8*   BUN 14 19  --  38*   CREATININE 0.79 0.84  --  2.23*   EGFR 92.6 90.9  --  30.0*   GLUCOSE 133* 177*  --  284*   CALCIUM 9.2 9.2  --  9.2   MAGNESIUM  --  1.5*  --   --    PHOSPHORUS  --  2.6  --   --    HEMOGLOBIN A1C  --   --  9.22*  --    TSH  --   --   --  4.610*         Lab 02/13/25  0648   TOTAL PROTEIN 7.9   ALBUMIN 4.0   GLOBULIN 3.9   ALT (SGPT) 25   AST (SGOT) 40   BILIRUBIN 0.3   ALK PHOS 51             Lab 02/13/25  0648   CHOLESTEROL 159   LDL CHOL 113*   HDL CHOL 30*   TRIGLYCERIDES 85         Lab 02/13/25  0648   FOLATE 16.60   VITAMIN B 12 428         Brief Urine Lab Results  (Last result in the past 365 days)        Color   Clarity   Blood   Leuk Est   Nitrite   Protein   CREAT   Urine HCG        02/13/25 0806 Dark Yellow   Slightly Cloudy  Comment: Result checked     Negative   Negative   Negative   100 mg/dL (2+)                 Microbiology Results (last 10 days)       Procedure Component Value - Date/Time    Respiratory Panel PCR w/COVID-19(SARS-CoV-2) PRABHU/NANDO/ONELIA/PAD/COR/TREVOR In-House, NP Swab in UTM/VTM, 2 HR TAT - Swab, Nasopharynx [[id number]]  (Abnormal) Collected: 02/13/25 0649    Lab Status: Final result Specimen: Swab from Nasopharynx Updated: 02/13/25 0747     ADENOVIRUS, PCR Not Detected     Coronavirus 229E Not  Detected     Coronavirus HKU1 Not Detected     Coronavirus NL63 Not Detected     Coronavirus OC43 Not Detected     COVID19 Not Detected     Human Metapneumovirus Not Detected     Human Rhinovirus/Enterovirus Not Detected     Influenza A H1 2009 PCR Detected     Influenza B PCR Not Detected     Parainfluenza Virus 1 Not Detected     Parainfluenza Virus 2 Not Detected     Parainfluenza Virus 3 Not Detected     Parainfluenza Virus 4 Not Detected     RSV, PCR Not Detected     Bordetella pertussis pcr Not Detected     Bordetella parapertussis PCR Not Detected     Chlamydophila pneumoniae PCR Not Detected     Mycoplasma pneumo by PCR Not Detected    Narrative:      In the setting of a positive respiratory panel with a viral infection PLUS a negative procalcitonin without other underlying concern for bacterial infection, consider observing off antibiotics or discontinuation of antibiotics and continue supportive care. If the respiratory panel is positive for atypical bacterial infection (Bordetella pertussis, Chlamydophila pneumoniae, or Mycoplasma pneumoniae), consider antibiotic de-escalation to target atypical bacterial infection.            MRI Angiogram Head Without Contrast    Result Date: 2/14/2025  Impression: Impression: No proximal large vessel occlusion or severe stenosis of the included major arteries of the head and neck. Electronically Signed: Nestor Elaine  2/14/2025 9:21 PM EST  Workstation ID: [id number]    MRI Angiogram Neck Without Contrast    Result Date: 2/14/2025  Impression: Impression: No proximal large vessel occlusion or severe stenosis of the included major arteries of the head and neck. Electronically Signed: Nestor Elaine  2/14/2025 9:21 PM EST  Workstation ID: [id number]    MRI Brain Without Contrast    Result Date: 2/14/2025  Impression: Impression: 1. Acute infarct involving anterior left thalamus. 2. Encephalomalacia involving right posterior temporal lobe related to sequela of remote  infarct. 3. Generalized cerebral atrophy with mild white matter findings of chronic microvascular disease. I called findings to [facility] and discussed with patient's nurse Annia at 7:23 a.m. on 2/14/2025. Electronically Signed: Demetrio Hall MD  2/14/2025 7:26 AM EST  Workstation ID: [id number]    US Renal Bilateral    Result Date: 2/13/2025  Impression: Impression: No hydronephrosis or sonographically evident nephrolithiasis. Electronically Signed: Nestor D Argentina  2/13/2025 7:02 PM EST  Workstation ID: [id number]    XR Spine Cervical 2 or 3 View    Result Date: 2/13/2025  Impression: Impression: 1.Changes from C4 corpectomy with ACDF at C3-C5. One or both of the C3 screws are fractured. 2.Multilevel degenerative changes as described above. Electronically Signed: Demario Bettencourt MD  2/13/2025 8:51 AM EST  Workstation ID: [id number]    CT Head Without Contrast    Result Date: 2/13/2025  Impression: Impression: 1. No acute intracranial abnormality. 2. Remote right posterior temporal infarct with encephalomalacia. 3. Cervical ACDF hardware partially imaged on  radiograph. C3 screw appears fractured, cervical radiographs may be helpful to better assess. Electronically Signed: Demetrio Hall MD  2/13/2025 7:31 AM EST  Workstation ID: [id number]    XR Chest 1 View    Result Date: 2/13/2025  Impression: Impression: No acute cardiopulmonary process. Electronically Signed: Beth Munoz MD  2/13/2025 6:52 AM EST  Workstation ID: [id number]             Results for orders placed during the hospital encounter of 02/13/25    Adult Transthoracic Echo Complete W/ Cont if Necessary Per Protocol (With Agitated Saline)    Interpretation Summary    Left ventricular systolic function is moderately decreased. Calculated left ventricular EF = 29% Left ventricular ejection fraction appears to be 26 - 30%.    The left ventricular cavity is moderately dilated.    Left ventricular diastolic function is consistent with (grade III w/high LAP)  reversible restrictive pattern.    Mildly reduced right ventricular systolic function noted.    The left atrial cavity is dilated.    Saline test results are negative for right to left atrial level shunt.    Moderate mitral valve regurgitation is present.    Estimated right ventricular systolic pressure from tricuspid regurgitation is normal (<35 mmHg).      Labs Pending at Discharge:  Pending Results       Procedure [Order ID] Specimen - Date/Time    Cardiac Event Monitor (TAWANA) or Mobile Cardiac Outpatient Telemetry (MCT) [[id number]] Resulted: 02/16/25 1055     Updated: 02/16/25 1055            Procedures Performed           Consults:   Consults       Date and Time Order Name Status Description    2/15/2025  3:03 PM Inpatient Cardiology Consult Completed     2/14/2025  9:56 AM Inpatient Neurology Consult Stroke Completed     2/13/2025 12:22 PM Inpatient Nephrology Consult Completed     2/13/2025  7:55 AM Inpatient Hospitalist Consult                Discharge Details        Discharge Medications        New Medications        Instructions Start Date   atorvastatin 80 MG tablet  Commonly known as: LIPITOR   80 mg, Oral, Nightly             Continue These Medications        Instructions Start Date   acetaminophen 325 MG tablet  Commonly known as: TYLENOL   650 mg, Oral, Every 12 Hours PRN      amLODIPine 5 MG tablet  Commonly known as: NORVASC   5 mg, Daily      aspirin 81 MG EC tablet   81 mg, Oral, Daily      clopidogrel 75 MG tablet  Commonly known as: PLAVIX   75 mg, Oral, Daily      glipizide 5 MG tablet  Commonly known as: GLUCOTROL   2.5 mg, Daily      isosorbide mononitrate 60 MG 24 hr tablet  Commonly known as: IMDUR   60 mg, Daily      metFORMIN  MG 24 hr tablet  Commonly known as: GLUCOPHAGE-XR   500 mg, 2 Times Daily             Stop These Medications      gemfibrozil 600 MG tablet  Commonly known as: LOPID     losartan 50 MG tablet  Commonly known as: COZAAR              No Known  Allergies      Discharge Disposition:   Home or Self Care    Diet:  Hospital:  Diet Order   Procedures    Diet: Cardiac, Diabetic; Healthy Heart (2-3 Na+); Consistent Carbohydrate; Fluid Consistency: Thin (IDDSI 0)         Discharge Activity:   as tolerated     CODE STATUS:  Code Status and Medical Interventions: CPR (Attempt to Resuscitate); Full Support   Ordered at: 02/13/25 0912     Code Status (Patient has no pulse and is not breathing):    CPR (Attempt to Resuscitate)     Medical Interventions (Patient has pulse or is breathing):    Full Support         No future appointments.        Time spent on Discharge including face to face service:  >35 minutes    Signature: Electronically signed by Sav Ambrosio MD, 02/16/25, 13:17 EST.  [facility] Hospitalist Team

## 2025-02-16 NOTE — PROGRESS NOTES
"RENAL/KCC:     LOS: 3 days    Patient Care Team:  Joie Issa APRN as PCP - General (Nurse Practitioner)    Chief Complaint:  RAÚL    Subjective     Interval History:   Chart reviewed  Feels well    Objective     Vital Sign Min/Max for last 24 hours  Temp  Min: 96.9 °F (36.1 °C)  Max: 99.8 °F (37.7 °C)   BP  Min: 103/54  Max: 143/75   Pulse  Min: 49  Max: 82   Resp  Min: 18  Max: 21   SpO2  Min: 91 %  Max: 96 %   Flow (L/min) (Oxygen Therapy)  Min: 2  Max: 2   No data recorded     Flowsheet Rows      Flowsheet Row First Filed Value   Admission Height 170.2 cm (67\") Documented at 02/13/2025 0906   Admission Weight 81.6 kg (180 lb) Documented at 02/13/2025 0906            I/O this shift:  In: 240 [P.O.:240]  Out: -   I/O last 3 completed shifts:  In: 720 [P.O.:720]  Out: -     Physical Exam:  GEN: Awake, NAD  ENT: PERRL, EOMI, MMM  NECK: Supple, no JVD  CHEST: CTAB, no W/R/C  CV: RRR, no M/G/R  ABD: Soft, NT, +BS  SKIN: Warm and Dry  NEURO: CN's intact      WBC WBC   Date Value Ref Range Status   02/16/2025 9.39 3.40 - 10.80 10*3/mm3 Final   02/15/2025 7.52 3.40 - 10.80 10*3/mm3 Final   02/13/2025 8.60 3.40 - 10.80 10*3/mm3 Final      HGB Hemoglobin   Date Value Ref Range Status   02/16/2025 12.6 (L) 13.0 - 17.7 g/dL Final   02/15/2025 12.4 (L) 13.0 - 17.7 g/dL Final   02/13/2025 13.7 13.0 - 17.7 g/dL Final      HCT Hematocrit   Date Value Ref Range Status   02/16/2025 38.9 37.5 - 51.0 % Final   02/15/2025 38.7 37.5 - 51.0 % Final   02/13/2025 43.9 37.5 - 51.0 % Final      Platlets No results found for: \"LABPLAT\"   MCV MCV   Date Value Ref Range Status   02/16/2025 88.8 79.0 - 97.0 fL Final   02/15/2025 89.0 79.0 - 97.0 fL Final   02/13/2025 92.2 79.0 - 97.0 fL Final          Sodium Sodium   Date Value Ref Range Status   02/16/2025 137 136 - 145 mmol/L Final   02/15/2025 139 136 - 145 mmol/L Final   02/13/2025 138 136 - 145 mmol/L Final      Potassium Potassium   Date Value Ref Range Status   02/16/2025 3.9 3.5 - " "5.2 mmol/L Final   02/15/2025 4.1 3.5 - 5.2 mmol/L Final   02/13/2025 4.4 3.5 - 5.2 mmol/L Final      Chloride Chloride   Date Value Ref Range Status   02/16/2025 102 98 - 107 mmol/L Final   02/15/2025 104 98 - 107 mmol/L Final   02/13/2025 99 98 - 107 mmol/L Final      CO2 CO2   Date Value Ref Range Status   02/16/2025 24.3 22.0 - 29.0 mmol/L Final   02/15/2025 23.2 22.0 - 29.0 mmol/L Final   02/13/2025 22.2 22.0 - 29.0 mmol/L Final      BUN BUN   Date Value Ref Range Status   02/16/2025 14 8 - 23 mg/dL Final   02/15/2025 19 8 - 23 mg/dL Final   02/13/2025 38 (H) 8 - 23 mg/dL Final      Creatinine Creatinine   Date Value Ref Range Status   02/16/2025 0.79 0.76 - 1.27 mg/dL Final   02/15/2025 0.84 0.76 - 1.27 mg/dL Final   02/13/2025 2.23 (H) 0.76 - 1.27 mg/dL Final      Calcium Calcium   Date Value Ref Range Status   02/16/2025 9.2 8.6 - 10.5 mg/dL Final   02/15/2025 9.2 8.6 - 10.5 mg/dL Final   02/13/2025 9.2 8.6 - 10.5 mg/dL Final      PO4 No results found for: \"CAPO4\"   Albumin Albumin   Date Value Ref Range Status   02/13/2025 4.0 3.5 - 5.2 g/dL Final      Magnesium Magnesium   Date Value Ref Range Status   02/15/2025 1.5 (L) 1.6 - 2.4 mg/dL Final      Uric Acid No results found for: \"URICACID\"        Results Review:     I reviewed the patient's new clinical results.    amLODIPine, 5 mg, Oral, Daily  aspirin, 81 mg, Oral, Daily  atorvastatin, 80 mg, Oral, Nightly  clopidogrel, 75 mg, Oral, Daily  insulin lispro, 2-9 Units, Subcutaneous, 4x Daily AC & at Bedtime  isosorbide mononitrate, 60 mg, Oral, Daily  [Held by provider] losartan, 50 mg, Oral, Daily  sodium chloride, 10 mL, Intravenous, Q12H           Medication Review: Reviewed    Assessment & Plan     1) RAÚL - pre-renal  2) Flu A  3) CVA  4) HTN  5) DM  6) CAD/CABG  7) HLD  8) Hypomagnesemia     PLAN: Cr improved to 0.79.  Losartan on hold.  Have room to titrate Amlodipine if needed.  Dispo per primary - OK for D/C.  Can keep ARB on hold until sees PCP.  " Will follow.        Demario Manley MD  [facility]  02/16/25  06:04 EST

## 2025-02-16 NOTE — PLAN OF CARE
Problem: Adult Inpatient Plan of Care  Goal: Plan of Care Review  Outcome: Met  Flowsheets (Taken 2/14/2025 1659 by Nathan Del Castillo, OT)  Outcome Evaluation: Pt. is a 73 y/o M admitted to [facility] on 2/13/25 for confusion, R facial droop, difficulty speaking. RVP (+) flu A. MRI brain (+) acute L anterior thalamic infarct. Pt. lives at home w/ spouse and maintains I/ADL independence at baseline. Pt. completes functional transfers this date unassisted, ambulates w/ supervision for safety. Pt. provided setup assist for all LB ADLs. Pt. w/ even strength BUE WFL, does not demonstrate CVA deficits this date w/ exception to impaired cognition. Impacts to STM, pt. spouse confirms this date. SLP to follow for aforementioned deficits, pt. at baseline functional ADL [place] and does not require continued skilled OT at this time. Recommend d/c home w/ spouse support/assist PRN.  Goal: Patient-Specific Goal (Individualized)  Outcome: Met  Goal: Absence of Hospital-Acquired Illness or Injury  Outcome: Met  Intervention: Identify and Manage Fall Risk  Recent Flowsheet Documentation  Taken 2/16/2025 0840 by Aide Heath LPN  Safety Promotion/Fall Prevention:   safety round/check completed   room organization consistent   assistive device/personal items within reach   clutter free environment maintained  Intervention: Prevent Skin Injury  Recent Flowsheet Documentation  Taken 2/16/2025 0840 by Aide Heath LPN  Body Position: position changed independently  Skin Protection: transparent dressing maintained  Intervention: Prevent and Manage VTE (Venous Thromboembolism) Risk  Recent Flowsheet Documentation  Taken 2/16/2025 0840 by Aide Heath LPN  VTE Prevention/Management:   bilateral   SCDs (sequential compression devices) off   patient refused intervention  Intervention: Prevent Infection  Recent Flowsheet Documentation  Taken 2/16/2025 0840 by Aide Heath LPN  Infection Prevention:   environmental surveillance  performed   equipment surfaces disinfected   rest/sleep promoted   single patient room provided  Goal: Optimal Comfort and Wellbeing  Outcome: Met  Intervention: Provide Person-Centered Care  Recent Flowsheet Documentation  Taken 2/16/2025 0840 by Aide Heath LPN  Trust Relationship/Rapport:   care explained   choices provided   questions encouraged   thoughts/feelings acknowledged  Goal: Readiness for Transition of Care  Outcome: Met     Problem: Fall Injury Risk  Goal: Absence of Fall and Fall-Related Injury  Outcome: Met  Intervention: Identify and Manage Contributors  Recent Flowsheet Documentation  Taken 2/16/2025 0840 by Aide Heath LPN  Medication Review/Management: medications reviewed  Intervention: Promote Injury-Free Environment  Recent Flowsheet Documentation  Taken 2/16/2025 0840 by Aide Heath LPN  Safety Promotion/Fall Prevention:   safety round/check completed   room organization consistent   assistive device/personal items within reach   clutter free environment maintained     Problem: Stroke, Ischemic (Includes Transient Ischemic Attack)  Goal: Optimal Coping  Outcome: Met  Intervention: Support Psychosocial Response to Stroke  Recent Flowsheet Documentation  Taken 2/16/2025 0840 by Aide Heath LPN  Family/Support System Care: support provided  Goal: Effective Bowel Elimination  Outcome: Met  Goal: Optimal Cerebral Tissue Perfusion  Outcome: Met  Intervention: Protect and Optimize Cerebral Perfusion  Recent Flowsheet Documentation  Taken 2/16/2025 0840 by Aide Heath LPN  Sensory Stimulation Regulation:   care clustered   quiet environment promoted  Cerebral Perfusion Promotion: blood pressure monitored  Goal: Optimal Cognitive Function  Outcome: Met  Intervention: Optimize Cognitive Function  Recent Flowsheet Documentation  Taken 2/16/2025 0840 by Aide Heath LPN  Sensory Stimulation Regulation:   care clustered   quiet environment promoted  Reorientation Measures: clock in  view  Goal: Improved Communication Skills  Outcome: Met  Intervention: Optimize Communication Skills  Recent Flowsheet Documentation  Taken 2/16/2025 0840 by Aide Heath LPN  Communication Enhancement Strategies:   call light answered in person   communication board used  Goal: Optimal Functional Ability  Outcome: Met  Intervention: Optimize Functional Ability  Recent Flowsheet Documentation  Taken 2/16/2025 0840 by Aide Heath LPN  Activity Management: activity encouraged  Goal: Optimal Nutrition Intake  Outcome: Met  Goal: Effective Oxygenation and Ventilation  Outcome: Met  Intervention: Optimize Oxygenation and Ventilation  Recent Flowsheet Documentation  Taken 2/16/2025 0840 by Aide Heath LPN  Head of Bed (HOB) Positioning: HOB elevated  Goal: Improved Sensorimotor Function  Outcome: Met  Intervention: Optimize Range of Motion, Motor Control and Function  Recent Flowsheet Documentation  Taken 2/16/2025 0840 by Aide Heath LPN  Positioning/Transfer Devices:   pillows   in use  Intervention: Optimize Sensory and Perceptual Ability  Recent Flowsheet Documentation  Taken 2/16/2025 0840 by Aide Heath LPN  Pressure Reduction Techniques: frequent weight shift encouraged  Pressure Reduction Devices: pressure-redistributing mattress utilized  Goal: Safe and Effective Swallow  Outcome: Met  Goal: Effective Urinary Elimination  Outcome: Met     Problem: Comorbidity Management  Goal: Blood Glucose Level Within Target Range  Outcome: Met  Intervention: Monitor and Manage Glycemia  Recent Flowsheet Documentation  Taken 2/16/2025 0840 by Aide Heath LPN  Medication Review/Management: medications reviewed  Goal: Blood Pressure in Desired Range  Outcome: Met  Intervention: Maintain Blood Pressure Management  Recent Flowsheet Documentation  Taken 2/16/2025 0840 by Aide Heath LPN  Medication Review/Management: medications reviewed   Goal Outcome Evaluation:

## 2025-02-16 NOTE — PROGRESS NOTES
Referring Provider: Sav Ambrosio,*    Reason for follow-up: Stroke workup     Patient Care Team:  Joie Issa APRN as PCP - General (Nurse Practitioner)      SUBJECTIVE  Resting comfortably in bed.  Wife at bedside.     ROS  Review of all systems negative except as indicated.    Since I have last seen, the patient has been without any chest discomfort, shortness of breath, palpitations, dizziness or syncope.  Denies having any headache, abdominal pain, nausea, vomiting, diarrhea, constipation, loss of weight or loss of appetite.  Denies having any excessive bruising, hematuria or blood in the stool.        Personal History:    History reviewed. No pertinent past medical history.    History reviewed. No pertinent surgical history.    History reviewed. No pertinent family history.    Social History     Tobacco Use    Smoking status: Former     Types: Cigarettes     Start date: 1975    Smokeless tobacco: Never   Vaping Use    Vaping status: Never Used   Substance Use Topics    Drug use: Never        Home meds:  Prior to Admission medications    Medication Sig Start Date End Date Taking? Authorizing Provider   amLODIPine (NORVASC) 5 MG tablet Take 1 tablet by mouth Daily.   Yes Leonor Reyes MD   aspirin 81 MG EC tablet Take 1 tablet by mouth Daily.   Yes Leonor Reyes MD   clopidogrel (PLAVIX) 75 MG tablet Take 1 tablet by mouth Daily.   Yes Leonor Reyes MD   gemfibrozil (LOPID) 600 MG tablet Take 1 tablet by mouth Daily.   Yes Leonor Reyes MD   glipizide (GLUCOTROL) 5 MG tablet Take 0.5 tablets by mouth Daily.   Yes Leonor Reyes MD   isosorbide mononitrate (IMDUR) 60 MG 24 hr tablet Take 1 tablet by mouth Daily.   Yes Leonor Reyes MD   losartan (COZAAR) 50 MG tablet Take 1 tablet by mouth Daily.   Yes Leonor Reyes MD   metFORMIN ER (GLUCOPHAGE-XR) 500 MG 24 hr tablet Take 1 tablet by mouth 2 (Two) Times a Day.   Yes Leonor Reyes MD  "  acetaminophen (TYLENOL) 325 MG tablet Take 2 tablets by mouth Every 12 (Twelve) Hours As Needed for Mild Pain.    Provider, MD Leonor       Allergies:  Patient has no known allergies.    Scheduled Meds:amLODIPine, 5 mg, Oral, Daily  aspirin, 81 mg, Oral, Daily  atorvastatin, 80 mg, Oral, Nightly  clopidogrel, 75 mg, Oral, Daily  insulin lispro, 2-9 Units, Subcutaneous, 4x Daily AC & at Bedtime  isosorbide mononitrate, 60 mg, Oral, Daily  [Held by provider] losartan, 50 mg, Oral, Daily  sodium chloride, 10 mL, Intravenous, Q12H      Continuous Infusions:   PRN Meds:.  acetaminophen **OR** acetaminophen **OR** acetaminophen    senna-docusate sodium **AND** polyethylene glycol **AND** bisacodyl **AND** bisacodyl    Calcium Replacement - Follow Nurse / BPA Driven Protocol    dextrose    dextrose    glucagon (human recombinant)    Magnesium Standard Dose Replacement - Follow Nurse / BPA Driven Protocol    melatonin    ondansetron ODT **OR** ondansetron    Phosphorus Replacement - Follow Nurse / BPA Driven Protocol    Potassium Replacement - Follow Nurse / BPA Driven Protocol    sodium chloride    sodium chloride      OBJECTIVE    Vital Signs  Vitals:    02/15/25 1956 02/15/25 2333 02/15/25 2335 02/16/25 0327   BP: 122/61 103/54 111/42 139/62   BP Location: Right arm  Right arm Right arm   Patient Position: Lying  Lying Lying   Pulse: 82 56 (!) 49 58   Resp: 20  19 21   Temp: 99.8 °F (37.7 °C)  98.9 °F (37.2 °C) 98.2 °F (36.8 °C)   TempSrc: Axillary  Axillary Axillary   SpO2: 93% 92% 91% 96%   Weight:       Height:           Flowsheet Rows      Flowsheet Row First Filed Value   Admission Height 170.2 cm (67\") Documented at 02/13/2025 0906   Admission Weight 81.6 kg (180 lb) Documented at 02/13/2025 0906              Intake/Output Summary (Last 24 hours) at 2/16/2025 0803  Last data filed at 2/15/2025 2030  Gross per 24 hour   Intake 240 ml   Output --   Net 240 ml          Telemetry: Sinus rhythm    Physical " Exam:  The patient is alert, oriented and in no distress.  Vital signs as noted above.  Head and neck revealed no carotid bruits or jugular venous distention.  No thyromegaly or lymphadenopathy is present  Lungs clear.  No wheezing.  Breath sounds are normal bilaterally.  Heart normal first and second heart sounds.  No murmur. No precordial rub is present.  No gallop is present.  Abdomen soft and nontender.  No organomegaly is present.  Extremities with good peripheral pulses without any pedal edema.  Skin warm and dry.  Musculoskeletal system is grossly normal.  CNS grossly normal.       Results Review:  I have personally reviewed the results from the time of this admission to 2/16/2025 08:03 EST and agree with these findings:  []  Laboratory  []  Microbiology  []  Radiology  []  EKG/Telemetry   []  Cardiology/Vascular   []  Pathology  []  Old records  []  Other:    Most notable findings include:    Lab Results (last 24 hours)       Procedure Component Value Units Date/Time    CBC & Differential [[id number]]  (Abnormal) Collected: 02/16/25 0330    Specimen: Blood Updated: 02/16/25 0507    Narrative:      The following orders were created for panel order CBC & Differential.  Procedure                               Abnormality         Status                     ---------                               -----------         ------                     CBC Auto Differential[[id number]]        Abnormal            Final result               Scan Slide[[id number]]                                       Final result                 Please view results for these tests on the individual orders.    CBC Auto Differential [[id number]]  (Abnormal) Collected: 02/16/25 0330    Specimen: Blood Updated: 02/16/25 0507     WBC 9.39 10*3/mm3      RBC 4.38 10*6/mm3      Hemoglobin 12.6 g/dL      Hematocrit 38.9 %      MCV 88.8 fL      MCH 28.8 pg      MCHC 32.4 g/dL      RDW 13.0 %      RDW-SD 42.7 fl      MPV 10.7 fL      Platelets 237 10*3/mm3      Narrative:      The previously reported component NRBC is no longer being reported. Previous result was 0.0 /100 WBC (Reference Range: 0.0-0.2 /100 WBC) on 2/16/2025 at 0446 EST.    Scan Slide [[id number]] Collected: 02/16/25 0330    Specimen: Blood Updated: 02/16/25 0507     Scan Slide --     Comment: See Manual Differential Results       Manual Differential [[id number]]  (Abnormal) Collected: 02/16/25 0330    Specimen: Blood Updated: 02/16/25 0507     Neutrophil % 55.0 %      Lymphocyte % 26.0 %      Monocyte % 10.0 %      Eosinophil % 3.0 %      Bands %  6.0 %      Neutrophils Absolute 5.73 10*3/mm3      Lymphocytes Absolute 2.44 10*3/mm3      Monocytes Absolute 0.94 10*3/mm3      Eosinophils Absolute 0.28 10*3/mm3      RBC Morphology Normal     WBC Morphology Normal     Platelet Morphology Normal    Basic Metabolic Panel [[id number]]  (Abnormal) Collected: 02/16/25 0330    Specimen: Blood Updated: 02/16/25 0501     Glucose 133 mg/dL      BUN 14 mg/dL      Creatinine 0.79 mg/dL      Sodium 137 mmol/L      Potassium 3.9 mmol/L      Chloride 102 mmol/L      CO2 24.3 mmol/L      Calcium 9.2 mg/dL      BUN/Creatinine Ratio 17.7     Anion Gap 10.7 mmol/L      eGFR 92.6 mL/min/1.73     Narrative:      GFR Categories in Chronic Kidney Disease (CKD)      GFR Category          GFR (mL/min/1.73)    Interpretation  G1                     90 or greater         Normal or high (1)  G2                      60-89                Mild decrease (1)  G3a                   45-59                Mild to moderate decrease  G3b                   30-44                Moderate to severe decrease  G4                    15-29                Severe decrease  G5                    14 or less           Kidney failure          (1)In the absence of evidence of kidney disease, neither GFR category G1 or G2 fulfill the criteria for CKD.    eGFR calculation 2021 CKD-EPI creatinine equation, which does not include race as a factor    POC Glucose  Once [[id number]]  (Abnormal) Collected: 02/15/25 2010    Specimen: Blood Updated: 02/15/25 2012     Glucose 231 mg/dL      Comment: Serial Number: [id number]:  [id number]       POC Glucose Once [[id number]]  (Abnormal) Collected: 02/15/25 1621    Specimen: Blood Updated: 02/15/25 1623     Glucose 177 mg/dL      Comment: Serial Number: [id number]:  [id number]       P2Y12 Platelet Inhibition [[id number]]  (Normal) Collected: 02/15/25 1136    Specimen: Blood Updated: 02/15/25 1329     P2Y12 Platelet Inhibition 199 PRU     Narrative:      Test results are in P2Y12 reaction units (PRU). This measures the extent of platelet aggregation in the presence of P2Y12 inhibitor drugs such as clopidrogrel (Plavix), prasugrel (Effient), ticagrelor (Brilinta), and ticlopidine (Ticlid).   Pre-Drug normal reference range: 194 - 418 PRU   P2Y12 values <194 (low end of reference range) are specific evidence of a P2Y12 inhibitor effect   Patients who have been treated with Glycoprotein IIb/IIIa inhibitors should not be tested until platelet function has recovered. This time period is approximately 14 days after discontinuation of abciximab (ReoPro) and up to 48 hours after discontinuation of eptifibatide (Integrillin) and tirofiban (Aggratstat).   Results should be interpreted in conjunction with other laboratory and clinical data available to the clinician.    POC Glucose Once [[id number]]  (Abnormal) Collected: 02/15/25 1250    Specimen: Blood Updated: 02/15/25 1251     Glucose 218 mg/dL      Comment: Serial Number: [id number]:  [id number]       POC Glucose Once [[id number]]  (Abnormal) Collected: 02/15/25 0826    Specimen: Blood Updated: 02/15/25 0828     Glucose 152 mg/dL      Comment: Serial Number: [id number]:  [id number]               Imaging Results (Last 24 Hours)       ** No results found for the last 24 hours. **            LAB RESULTS (LAST 7 DAYS)    CBC  Results from last 7 days   Lab Units 02/16/25  2851  02/15/25  0319 02/13/25  0648   WBC 10*3/mm3 9.39 7.52 8.60   RBC 10*6/mm3 4.38 4.35 4.76   HEMOGLOBIN g/dL 12.6* 12.4* 13.7   HEMATOCRIT % 38.9 38.7 43.9   MCV fL 88.8 89.0 92.2   PLATELETS 10*3/mm3 237 241 258       BMP  Results from last 7 days   Lab Units 02/16/25  0330 02/15/25  0319 02/13/25  0648   SODIUM mmol/L 137 139 138   POTASSIUM mmol/L 3.9 4.1 4.4   CHLORIDE mmol/L 102 104 99   CO2 mmol/L 24.3 23.2 22.2   BUN mg/dL 14 19 38*   CREATININE mg/dL 0.79 0.84 2.23*   GLUCOSE mg/dL 133* 177* 284*   MAGNESIUM mg/dL  --  1.5*  --    PHOSPHORUS mg/dL  --  2.6  --        CMP   Results from last 7 days   Lab Units 02/16/25  0330 02/15/25  0319 02/13/25  0648   SODIUM mmol/L 137 139 138   POTASSIUM mmol/L 3.9 4.1 4.4   CHLORIDE mmol/L 102 104 99   CO2 mmol/L 24.3 23.2 22.2   BUN mg/dL 14 19 38*   CREATININE mg/dL 0.79 0.84 2.23*   GLUCOSE mg/dL 133* 177* 284*   ALBUMIN g/dL  --   --  4.0   BILIRUBIN mg/dL  --   --  0.3   ALK PHOS U/L  --   --  51   AST (SGOT) U/L  --   --  40   ALT (SGPT) U/L  --   --  25   AMMONIA umol/L  --   --  24       BNP        TROPONIN        CoAg        Creatinine Clearance  Estimated Creatinine Clearance: 83.5 mL/min (by C-G formula based on SCr of 0.79 mg/dL).    ABG        Radiology  MRI Angiogram Head Without Contrast    Result Date: 2/14/2025  Impression: No proximal large vessel occlusion or severe stenosis of the included major arteries of the head and neck. Electronically Signed: Nestor Elaine  2/14/2025 9:21 PM EST  Workstation ID: [id number]    MRI Angiogram Neck Without Contrast    Result Date: 2/14/2025  Impression: No proximal large vessel occlusion or severe stenosis of the included major arteries of the head and neck. Electronically Signed: Nestor Elaine  2/14/2025 9:21 PM EST  Workstation ID: [id number]       EKG  I personally viewed and interpreted the patient's EKG/Telemetry data:  ECG 12 Lead Altered Mental Status   Final Result   HEART RATE=59  bpm   RR Inwcvjbb=9156   ms   MS Sqimgpco=317  ms   P Horizontal Axis=38  deg   P Front Axis=54  deg   QRSD Ttiwjsju=241  ms   QT Ufmxzdke=152  ms   OUjX=475  ms   QRS Axis=-32  deg   T Wave Axis=91  deg   - ABNORMAL ECG -   Sinus bradycardia   Nonspecific  intraventricular conduction delay   No previous ECG available for comparison   Electronically Signed By: Dhaval Story (Babak) 2025-02-14 08:38:11   Date and Time of Study:2025-02-13 06:39:40      Telemetry Scan   Final Result      Telemetry Scan   Final Result      Telemetry Scan   Final Result      Telemetry Scan   Final Result      Telemetry Scan   Final Result            Echocardiogram:    Results for orders placed during the hospital encounter of 02/13/25    Adult Transthoracic Echo Complete W/ Cont if Necessary Per Protocol (With Agitated Saline)    Interpretation Summary    Left ventricular systolic function is moderately decreased. Calculated left ventricular EF = 29% Left ventricular ejection fraction appears to be 26 - 30%.    The left ventricular cavity is moderately dilated.    Left ventricular diastolic function is consistent with (grade III w/high LAP) reversible restrictive pattern.    Mildly reduced right ventricular systolic function noted.    The left atrial cavity is dilated.    Saline test results are negative for right to left atrial level shunt.    Moderate mitral valve regurgitation is present.    Estimated right ventricular systolic pressure from tricuspid regurgitation is normal (<35 mmHg).        Stress Test:         Cardiac Catheterization:  No results found for this or any previous visit.         Other:         ASSESSMENT & PLAN:    Principal Problem:    Influenza A  Active Problems:    Acute ischemic VBA thalamic stroke, left    Acute ischemic stroke    Acute ischemic stroke  Acute infarct involving anterior left thalamus  MRA with no evidence of proximal vessel occlusion.  Continue dual antiplatelet therapy, high intensity statin  MCOT at the time of  discharge  Outpatient TIFFANIE/     Coronary artery disease  Extensive CAD with previous CABG and multiple PCI's.  Continue dual antiplatelet therapy, high intensity statin.  Not on beta-blocker due to bradycardia.  Continue Imdur and amlodipine     Hypertension  Blood pressure is well-controlled on amlodipine and Imdur  Losartan held due to renal dysfunction  Can be restarted now     RAÚL  Presented with creatinine of 2.2.  Creatinine 0.8, GFR is 92.6 now  Resolved with IV hydration.  Losartan can be restarted     HFrEF  Echocardiogram shows EF of 29%, grade 3 diastolic dysfunction with moderate mitral regurgitation.  Known history of HFrEF  Not on beta-blocker due to bradycardia  Continue losartan  Repeat echocardiogram once he recovers from flu  Uptitrate GDMT as outpatient: Follow-up with Dr. Samaniego     Carotid stenosis  Status post right carotid endarterectomy  Less than 50% stenosis on the left ICA continue dual antiplatelet therapy, high intensity statin     Hyperlipidemia/diabetes  , HDL 30, triglyceride 85 and total cholesterol 159.  A1c is 9.2  Needs better risk factors control  Continue high intensity statin  Start Jardiance  Continue insulin     Influenza A  Supportive management per primary      Taz Perez MD  02/16/25  08:03 EST

## 2025-02-16 NOTE — PLAN OF CARE
Problem: Adult Inpatient Plan of Care  Goal: Plan of Care Review  Outcome: Progressing  Goal: Patient-Specific Goal (Individualized)  Outcome: Progressing  Goal: Absence of Hospital-Acquired Illness or Injury  Outcome: Progressing  Intervention: Identify and Manage Fall Risk  Recent Flowsheet Documentation  Taken 2/16/2025 0154 by Iris Greene LPN  Safety Promotion/Fall Prevention: safety round/check completed  Taken 2/16/2025 0000 by Iris Greene LPN  Safety Promotion/Fall Prevention: safety round/check completed  Taken 2/15/2025 2200 by Iris Greene LPN  Safety Promotion/Fall Prevention: safety round/check completed  Taken 2/15/2025 2030 by Iris Greene LPN  Safety Promotion/Fall Prevention: safety round/check completed  Intervention: Prevent Skin Injury  Recent Flowsheet Documentation  Taken 2/15/2025 2030 by Iris Greene LPN  Body Position: position changed independently  Skin Protection: transparent dressing maintained  Intervention: Prevent and Manage VTE (Venous Thromboembolism) Risk  Recent Flowsheet Documentation  Taken 2/15/2025 2030 by Iris Greene LPN  VTE Prevention/Management:   bilateral   SCDs (sequential compression devices) off   patient refused intervention  Intervention: Prevent Infection  Recent Flowsheet Documentation  Taken 2/16/2025 0154 by Iris Greeen LPN  Infection Prevention:   environmental surveillance performed   equipment surfaces disinfected  Taken 2/16/2025 0000 by Iris Greene LPN  Infection Prevention:   environmental surveillance performed   equipment surfaces disinfected  Taken 2/15/2025 2200 by Iris Greene LPN  Infection Prevention:   environmental surveillance performed   equipment surfaces disinfected  Taken 2/15/2025 2030 by Iris Greene LPN  Infection Prevention:   environmental surveillance performed   equipment surfaces disinfected  Goal: Optimal Comfort and Wellbeing  Outcome: Progressing  Intervention: Provide Person-Centered  Care  Recent Flowsheet Documentation  Taken 2/15/2025 2030 by Iris Greene LPN  Trust Relationship/Rapport:   thoughts/feelings acknowledged   reassurance provided   questions answered   choices provided  Goal: Readiness for Transition of Care  Outcome: Progressing     Problem: Fall Injury Risk  Goal: Absence of Fall and Fall-Related Injury  Outcome: Progressing  Intervention: Identify and Manage Contributors  Recent Flowsheet Documentation  Taken 2/16/2025 0154 by Iris Greene LPN  Medication Review/Management: medications reviewed  Taken 2/16/2025 0000 by Iris Greene LPN  Medication Review/Management: medications reviewed  Taken 2/15/2025 2200 by Iris Greene LPN  Medication Review/Management: medications reviewed  Taken 2/15/2025 2030 by Iris Greene LPN  Medication Review/Management: medications reviewed  Intervention: Promote Injury-Free Environment  Recent Flowsheet Documentation  Taken 2/16/2025 0154 by Iris Greene LPN  Safety Promotion/Fall Prevention: safety round/check completed  Taken 2/16/2025 0000 by Iris Greene LPN  Safety Promotion/Fall Prevention: safety round/check completed  Taken 2/15/2025 2200 by Iris Greene LPN  Safety Promotion/Fall Prevention: safety round/check completed  Taken 2/15/2025 2030 by Iris Greene LPN  Safety Promotion/Fall Prevention: safety round/check completed     Problem: Stroke, Ischemic (Includes Transient Ischemic Attack)  Goal: Optimal Coping  Outcome: Progressing  Intervention: Support Psychosocial Response to Stroke  Recent Flowsheet Documentation  Taken 2/15/2025 2030 by Iris Greene LPN  Family/Support System Care: self-care encouraged  Goal: Effective Bowel Elimination  Outcome: Progressing  Goal: Optimal Cerebral Tissue Perfusion  Outcome: Progressing  Goal: Optimal Cognitive Function  Outcome: Progressing  Goal: Improved Communication Skills  Outcome: Progressing  Goal: Optimal Functional Ability  Outcome:  Progressing  Intervention: Optimize Functional Ability  Recent Flowsheet Documentation  Taken 2/15/2025 2030 by Iris Greene LPN  Activity Management: activity encouraged  Adaptive Equipment Use: used independently  Goal: Optimal Nutrition Intake  Outcome: Progressing  Goal: Effective Oxygenation and Ventilation  Outcome: Progressing  Intervention: Optimize Oxygenation and Ventilation  Recent Flowsheet Documentation  Taken 2/15/2025 2030 by Iris Greene LPN  Head of Bed (HOB) Positioning: HOB elevated  Goal: Improved Sensorimotor Function  Outcome: Progressing  Intervention: Optimize Range of Motion, Motor Control and Function  Recent Flowsheet Documentation  Taken 2/15/2025 2030 by Iris Greene LPN  Positioning/Transfer Devices:   pillows   in use  Range of Motion: ROM (range of motion) performed  Intervention: Optimize Sensory and Perceptual Ability  Recent Flowsheet Documentation  Taken 2/15/2025 2030 by Iris Greene LPN  Pressure Reduction Techniques: frequent weight shift encouraged  Pressure Reduction Devices: pressure-redistributing mattress utilized  Goal: Safe and Effective Swallow  Outcome: Progressing  Goal: Effective Urinary Elimination  Outcome: Progressing     Problem: Comorbidity Management  Goal: Blood Glucose Level Within Target Range  Outcome: Progressing  Intervention: Monitor and Manage Glycemia  Recent Flowsheet Documentation  Taken 2/16/2025 0154 by Iris Greene LPN  Medication Review/Management: medications reviewed  Taken 2/16/2025 0000 by Iris Greene LPN  Medication Review/Management: medications reviewed  Taken 2/15/2025 2200 by Iris Greene LPN  Medication Review/Management: medications reviewed  Taken 2/15/2025 2030 by Iris Greene LPN  Medication Review/Management: medications reviewed  Goal: Blood Pressure in Desired Range  Outcome: Progressing  Intervention: Maintain Blood Pressure Management  Recent Flowsheet Documentation  Taken 2/16/2025 0154 by  Iris Greene LPN  Medication Review/Management: medications reviewed  Taken 2/16/2025 0000 by Iris Greene LPN  Medication Review/Management: medications reviewed  Taken 2/15/2025 2200 by Iris Greene LPN  Medication Review/Management: medications reviewed  Taken 2/15/2025 2030 by Iris Greene LPN  Medication Review/Management: medications reviewed   Goal Outcome Evaluation:

## 2025-02-17 NOTE — OUTREACH NOTE
Prep Survey      Flowsheet Row Responses   [facility] patient discharged from? [facility]   Is LACE score < 7 ? No   Eligibility Readm Mgmt   Discharge diagnosis Influenza A, Acute left anterior thalamus infarct   Does the patient have one of the following disease processes/diagnoses(primary or secondary)? Stroke   Does the patient have Home health ordered? No   Is there a DME ordered? No   Prep survey completed? Yes            Gela MCDANIELS - Registered Nurse

## 2025-02-18 ENCOUNTER — TELEPHONE (OUTPATIENT)
Dept: CARDIOLOGY | Facility: CLINIC | Age: 75
End: 2025-02-18
Payer: MEDICARE

## 2025-02-18 NOTE — TELEPHONE ENCOUNTER
Caller: ELOISA MCCLELLAN    Relationship to patient: Emergency Contact    Best call back number: [phone]    Type of visit: HOSP FU    Requested date: 1 WEEK    Additional notes: PT'S DAUGHTER IS CALLING TO SCHEDULE A HOSPITAL FOLLOW UP FOR THE PT FOR 1 WEEK.

## 2025-02-20 ENCOUNTER — READMISSION MANAGEMENT (OUTPATIENT)
Dept: CALL CENTER | Facility: HOSPITAL | Age: 75
End: 2025-02-20
Payer: MEDICARE

## 2025-02-20 NOTE — OUTREACH NOTE
Stroke Week 1 Survey      Flowsheet Row Responses   [facility] patient discharged from? [facility]   Does the patient have one of the following disease processes/diagnoses(primary or secondary)? Stroke   Week 1 attempt successful? Yes   Call start time 1352   Call end time 1402   List who call center can speak with spouse   Meds reviewed with patient/caregiver? Yes   Is the patient having any side effects they believe may be caused by any medication additions or changes? No   Does the patient have all medications ordered at discharge? Yes   Is the patient taking all medications as directed (includes completed medication regime)? No   What is preventing the patient from taking all medications as directed? Other   Medication comments Pharmacy waiting on approval.   Comments regarding appointments Pt has had pcp f/u appointment.   Does the patient have a primary care provider?  Yes   Does the patient have an appointment with their PCP within 7 days of discharge? Yes   Has the patient kept scheduled appointments due by today? Yes   Has home health visited the patient within 72 hours of discharge? N/A   Psychosocial issues? No   Does the patient require any assistance with activities of daily living such as eating, bathing, dressing, walking, etc.? No   Does the patient have any residual symptoms from stroke/TIA? Yes   Residual symptoms comments slight confusion.   Did the patient receive a copy of their discharge instructions? Yes   Nursing interventions Reviewed instructions with patient   What is the patient's perception of their health status since discharge? Improving   Is the patient/caregiver able to teach back signs and symptoms related to disease process for when to call PCP? Yes   Is the patient/caregiver able to teach back signs and symptoms related to disease process for when to call 911? Yes   Is the patient/caregiver able to teach back the hierarchy of who to call/visit for symptoms/problems? PCP,  Specialist, Home health nurse, Urgent Care, ED, 911 Yes   Is the patient able to teach back FAST for Stroke? B alance: Watch for sudden loss of balance, E yes: Check for vision loss, F ace: Look for an uneven smile, A rm: Check if one arm is weak, S peech: Listen for slurred speech, T brittani: Call 9-1-1 right away   Week 1 call completed? Yes   Is the patient interested in additional calls from an ambulatory ? No   Would this patient benefit from a Referral to [facility] Social Work? No   Wrap up additional comments Spouse reports pt is improving slowly. Pt continues to have slight cognitive deficits at present. Pt is independent. Pt has had pcp f/u appointment.   Call end time 1402            Sahara BABIN - Registered Nurse

## 2025-02-24 ENCOUNTER — HOSPITAL ENCOUNTER (EMERGENCY)
Facility: HOSPITAL | Age: 75
Discharge: HOME | End: 2025-02-24
Admitting: EMERGENCY MEDICINE
Payer: MEDICARE

## 2025-02-24 ENCOUNTER — OFFICE VISIT (OUTPATIENT)
Dept: CARDIOLOGY | Facility: CLINIC | Age: 75
End: 2025-02-24
Payer: MEDICARE

## 2025-02-24 VITALS
SYSTOLIC BLOOD PRESSURE: 179 MMHG | DIASTOLIC BLOOD PRESSURE: 92 MMHG | OXYGEN SATURATION: 97 % | BODY MASS INDEX: 31.49 KG/M2 | RESPIRATION RATE: 18 BRPM | TEMPERATURE: 98.9 F | WEIGHT: 189 LBS | HEART RATE: 79 BPM | HEIGHT: 65 IN

## 2025-02-24 VITALS
BODY MASS INDEX: 31.49 KG/M2 | WEIGHT: 189 LBS | HEIGHT: 65 IN | HEART RATE: 76 BPM | RESPIRATION RATE: 18 BRPM | OXYGEN SATURATION: 90 % | DIASTOLIC BLOOD PRESSURE: 93 MMHG | SYSTOLIC BLOOD PRESSURE: 163 MMHG

## 2025-02-24 DIAGNOSIS — N17.9 AKI (ACUTE KIDNEY INJURY): Primary | ICD-10-CM

## 2025-02-24 DIAGNOSIS — G89.29 CHRONIC CHEST PAIN WITH HIGH RISK FOR CAD: Primary | ICD-10-CM

## 2025-02-24 DIAGNOSIS — Z91.89 CHRONIC CHEST PAIN WITH HIGH RISK FOR CAD: Primary | ICD-10-CM

## 2025-02-24 DIAGNOSIS — R07.9 CHRONIC CHEST PAIN WITH HIGH RISK FOR CAD: Primary | ICD-10-CM

## 2025-02-24 LAB
ALBUMIN SERPL-MCNC: 4.1 G/DL (ref 3.5–5.2)
ALBUMIN/GLOB SERPL: 1 G/DL
ALP SERPL-CCNC: 59 U/L (ref 39–117)
ALT SERPL W P-5'-P-CCNC: 16 U/L (ref 1–41)
ANION GAP SERPL CALCULATED.3IONS-SCNC: 11 MMOL/L (ref 5–15)
APTT PPP: 26.5 SECONDS (ref 22.7–35.4)
AST SERPL-CCNC: 29 U/L (ref 1–40)
BASOPHILS # BLD AUTO: 0.1 10*3/MM3 (ref 0–0.2)
BASOPHILS NFR BLD AUTO: 0.8 % (ref 0–1.5)
BILIRUB SERPL-MCNC: 0.3 MG/DL (ref 0–1.2)
BUN SERPL-MCNC: 14 MG/DL (ref 8–23)
BUN/CREAT SERPL: 18.9 (ref 7–25)
CALCIUM SPEC-SCNC: 10.3 MG/DL (ref 8.6–10.5)
CHLORIDE SERPL-SCNC: 101 MMOL/L (ref 98–107)
CO2 SERPL-SCNC: 28 MMOL/L (ref 22–29)
CREAT SERPL-MCNC: 0.74 MG/DL (ref 0.76–1.27)
DEPRECATED RDW RBC AUTO: 44 FL (ref 37–54)
EGFRCR SERPLBLD CKD-EPI 2021: 94.5 ML/MIN/1.73
EOSINOPHIL # BLD AUTO: 0.79 10*3/MM3 (ref 0–0.4)
EOSINOPHIL NFR BLD AUTO: 6.5 % (ref 0.3–6.2)
ERYTHROCYTE [DISTWIDTH] IN BLOOD BY AUTOMATED COUNT: 13.2 % (ref 12.3–15.4)
GLOBULIN UR ELPH-MCNC: 4.2 GM/DL
GLUCOSE SERPL-MCNC: 119 MG/DL (ref 65–99)
HCT VFR BLD AUTO: 43 % (ref 37.5–51)
HGB BLD-MCNC: 13.8 G/DL (ref 13–17.7)
HOLD SPECIMEN: NORMAL
IMM GRANULOCYTES # BLD AUTO: 0.09 10*3/MM3 (ref 0–0.05)
IMM GRANULOCYTES NFR BLD AUTO: 0.7 % (ref 0–0.5)
INR PPP: 1.08 (ref 0.9–1.1)
LYMPHOCYTES # BLD AUTO: 3.53 10*3/MM3 (ref 0.7–3.1)
LYMPHOCYTES NFR BLD AUTO: 29.1 % (ref 19.6–45.3)
MCH RBC QN AUTO: 28.8 PG (ref 26.6–33)
MCHC RBC AUTO-ENTMCNC: 32.1 G/DL (ref 31.5–35.7)
MCV RBC AUTO: 89.8 FL (ref 79–97)
MONOCYTES # BLD AUTO: 1.15 10*3/MM3 (ref 0.1–0.9)
MONOCYTES NFR BLD AUTO: 9.5 % (ref 5–12)
NEUTROPHILS NFR BLD AUTO: 53.4 % (ref 42.7–76)
NEUTROPHILS NFR BLD AUTO: 6.47 10*3/MM3 (ref 1.7–7)
NRBC BLD AUTO-RTO: 0 /100 WBC (ref 0–0.2)
PLATELET # BLD AUTO: 429 10*3/MM3 (ref 140–450)
PMV BLD AUTO: 10.1 FL (ref 6–12)
POTASSIUM SERPL-SCNC: 4.3 MMOL/L (ref 3.5–5.2)
PROT SERPL-MCNC: 8.3 G/DL (ref 6–8.5)
PROTHROMBIN TIME: 14 SECONDS (ref 11.7–14.2)
RBC # BLD AUTO: 4.79 10*6/MM3 (ref 4.14–5.8)
SODIUM SERPL-SCNC: 140 MMOL/L (ref 136–145)
WBC NRBC COR # BLD AUTO: 12.13 10*3/MM3 (ref 3.4–10.8)
WHOLE BLOOD HOLD COAG: NORMAL

## 2025-02-24 PROCEDURE — 80053 COMPREHEN METABOLIC PANEL: CPT

## 2025-02-24 PROCEDURE — 85730 THROMBOPLASTIN TIME PARTIAL: CPT

## 2025-02-24 PROCEDURE — 85610 PROTHROMBIN TIME: CPT

## 2025-02-24 PROCEDURE — 85025 COMPLETE CBC W/AUTO DIFF WBC: CPT

## 2025-02-24 PROCEDURE — 99283 EMERGENCY DEPT VISIT LOW MDM: CPT

## 2025-02-24 RX ORDER — LOSARTAN POTASSIUM 25 MG/1
50 TABLET ORAL DAILY
Status: ON HOLD | COMMUNITY
Start: 2025-02-16 | End: 2025-02-27

## 2025-02-24 RX ORDER — TRIAMCINOLONE ACETONIDE 1 MG/G
CREAM TOPICAL
COMMUNITY
Start: 2025-02-19

## 2025-02-24 RX ORDER — SODIUM CHLORIDE 0.9 % (FLUSH) 0.9 %
10 SYRINGE (ML) INJECTION AS NEEDED
Status: DISCONTINUED | OUTPATIENT
Start: 2025-02-24 | End: 2025-02-24 | Stop reason: HOSPADM

## 2025-02-24 RX ORDER — ATORVASTATIN CALCIUM 80 MG/1
80 TABLET, FILM COATED ORAL NIGHTLY
Qty: 90 TABLET | Refills: 3 | Status: SHIPPED | OUTPATIENT
Start: 2025-02-24

## 2025-02-24 NOTE — DISCHARGE INSTRUCTIONS
Follow-up with Dr. Samaniego for your heart catheterization.  He states that will be on Thursday and he will call you with a time.  Call him if you have not received a call    Follow-up with your primary care provider as well for follow-up    Return with any new or worsening symptoms

## 2025-02-24 NOTE — ED NOTES
Pts cardiologist sent pt to ER for abnormal kidney levels. Cariology was wanting to run some tests but pts labs came back abnormal for kidney function. Pt denies any urinary symptoms or Ill feeling or pain.

## 2025-02-24 NOTE — ED PROVIDER NOTES
Subjective   History of Present Illness  Due to significant overcrowding in the emergency department patient was evaluated by myself in a hallway bed. This exam may be limited by privacy, noise level and the patient not wearing a hospital gown.  Explained to the patient our limitations and our overcrowding.  They were in agreement to continue the exam and treatment at this time.     69-year-old male with history of diabetes, hyperlipidemia, hypertension, CAD presents the ED today after leaving Dr. Samaniego's office.  Patient family report that Dr. Samaniego states he wants patient to see a nephrologist and possibly have a heart cath in the next few days.  Patient denies any complaints.  Denies chest pain shortness of breath at time of assessment, syncope, fever, leg swelling    PCP: Maegan        Review of Systems   Constitutional:  Negative for fever.   Respiratory:  Negative for shortness of breath.    Cardiovascular:  Negative for chest pain.   Gastrointestinal:  Negative for nausea and vomiting.   Neurological:  Negative for syncope.       Past Medical History:   Diagnosis Date    Coronary artery disease     Diabetes mellitus     Hyperlipidemia     Hypertension        Allergies   Allergen Reactions    Statins Cough     Severe muscle aches/spasms       Past Surgical History:   Procedure Laterality Date    CARDIAC CATHETERIZATION      2015    CARDIAC CATHETERIZATION N/A 08/10/2022    Procedure: Left Heart Cath;  Surgeon: Nabil Samaniego MD;  Location: [facility] CATH INVASIVE LOC[facility];  Service: Cardiology;  Laterality: N/A;    CAROTID ENDARTERECTOMY      CAROTID STENT  2015    PCI  to SVG supplying the LC/OM    CORONARY ARTERY BYPASS GRAFT      1998    CORONARY STENT PLACEMENT         Family History   Problem Relation Age of Onset    Heart disease Mother     Hypertension Mother        Social History     Socioeconomic History    Marital status:    Tobacco Use    Smoking status: Former     Types: Cigarettes      Start date: 1975    Smokeless tobacco: Never   Vaping Use    Vaping status: Never Used   Substance and Sexual Activity    Alcohol use: Never    Drug use: Never    Sexual activity: Not Currently     Partners: Female     Birth control/protection: Post-menopausal           Objective   Physical Exam  Vitals reviewed.   HENT:      Head: Normocephalic.   Eyes:      Extraocular Movements: Extraocular movements intact.      Conjunctiva/sclera: Conjunctivae normal.   Cardiovascular:      Rate and Rhythm: Normal rate and regular rhythm.      Pulses: Normal pulses.      Heart sounds: Normal heart sounds.   Pulmonary:      Effort: Pulmonary effort is normal.      Breath sounds: Normal breath sounds.   Abdominal:      General: Bowel sounds are normal.      Palpations: Abdomen is soft.      Tenderness: There is no abdominal tenderness.   Musculoskeletal:         General: Normal range of motion.      Right lower leg: No edema.      Left lower leg: No edema.   Skin:     General: Skin is warm.   Neurological:      Mental Status: He is alert and oriented to person, place, and time.   Psychiatric:         Mood and Affect: Mood normal.         Behavior: Behavior normal.         Procedures           ED Course  ED Course as of 02/25/25 0206   Mon Feb 24, 2025   1559 Requested repeat vitals [KB]   1608 Requested call to Dr. Samaniego with cardiology, awaiting call back [KB]   1628 Called lab and requested the BMP be changed to CMP [KB]   1630 Spoke to Dr. Samaniego who states he didn't realize patient's Patrick had resolved prior to dc 2/16/25 and would like it rechecked. He also requested patient to have PT/INR and PTT for lab work for heart cath and would like a call back once labs have resulted [KB]   1712 Call placed to , awaiting call back [KB]   1717 Spoke to Dr. Samaniego who states patient is okay to discharge, states he will schedule his heart cath for Thursday and will call patient with time [KB]      ED Course User Index  [KB] Ulysses  "Rock, APRN      /92   Pulse 79   Temp 98.9 °F (37.2 °C) (Oral)   Resp 18   Ht 165.1 cm (65\")   Wt 85.7 kg (189 lb)   SpO2 97%   BMI 31.45 kg/m²   Labs Reviewed   CBC WITH AUTO DIFFERENTIAL - Abnormal; Notable for the following components:       Result Value    WBC 12.13 (*)     Eosinophil % 6.5 (*)     Immature Grans % 0.7 (*)     Lymphocytes, Absolute 3.53 (*)     Monocytes, Absolute 1.15 (*)     Eosinophils, Absolute 0.79 (*)     Immature Grans, Absolute 0.09 (*)     All other components within normal limits   COMPREHENSIVE METABOLIC PANEL - Abnormal; Notable for the following components:    Glucose 119 (*)     Creatinine 0.74 (*)     All other components within normal limits    Narrative:     GFR Categories in Chronic Kidney Disease (CKD)      GFR Category          GFR (mL/min/1.73)    Interpretation  G1                     90 or greater         Normal or high (1)  G2                      60-89                Mild decrease (1)  G3a                   45-59                Mild to moderate decrease  G3b                   30-44                Moderate to severe decrease  G4                    15-29                Severe decrease  G5                    14 or less           Kidney failure          (1)In the absence of evidence of kidney disease, neither GFR category G1 or G2 fulfill the criteria for CKD.    eGFR calculation 2021 CKD-EPI creatinine equation, which does not include race as a factor   APTT - Normal   PROTIME-INR - Normal   CBC AND DIFFERENTIAL    Narrative:     The following orders were created for panel order CBC & Differential.  Procedure                               Abnormality         Status                     ---------                               -----------         ------                     CBC Auto Differential[[id number]]        Abnormal            Final result                 Please view results for these tests on the individual orders.   EXTRA TUBES    Narrative:     The " following orders were created for panel order Extra Tubes.  Procedure                               Abnormality         Status                     ---------                               -----------         ------                     Gold Top - SST[[id number]]                                   Final result               Light Blue Top[[id number]]                                   Final result                 Please view results for these tests on the individual orders.   GOLD TOP - SST   LIGHT BLUE TOP     Medications - No data to display    No radiology results for the last day                                                   Medical Decision Making  Patient was seen for the above complaints.  Patient did not report any complaints during ER course but states he was sent from Dr. Samaniego's office for acute kidney injury whenever he was admitted on 2/13/2025.  IV was established and blood work was obtained.  White blood cell count 12.13 without signs of infection, hemoglobin 13.8, PT/INR PTT within normal limits, kidney function within normal limits, glucose 119.  Discussed the above blood work with Dr. Samaniego who agrees with discharge and instructed that patient will be having a heart cath on Thursday and he will call patient with the time.  On follow-up, patient continues to deny any complaints and verbalized understanding to follow-up with Dr. Samaniego and his primary care provider.  Discussed plan of care with patient and family bedside who verbalized understanding of agreeable plan of care at this time.  Discussed return precautions as well.    I discussed findings with patient who voices understanding of discharge instructions, signs and symptoms requiring return to ED; discharged improved and in stable condition with follow up for re-evaluation.  This document is intended for medical expert use only. Reading of this document by patients and/or patient's family without participating medical staff guidance may result  in misinterpretation and unintended morbidity.  Any interpretation of such data is the responsibility of the patient and/or family member responsible for the patient in concert with their primary or specialist providers, not to be left for sources of online searches such as [place], MissingLINK or similar queries. Relying on these approaches to knowledge may result in misinterpretation, misguided goals of care and even death should patients or family members try recommendations outside of the realm of professional medical care in a supervised inpatient environment.     Problems Addressed:  RAÚL (acute kidney injury): acute illness or injury    Amount and/or Complexity of Data Reviewed  Labs: ordered. Decision-making details documented in ED Course.        Final diagnoses:   RAÚL (acute kidney injury)       ED Disposition  ED Disposition       ED Disposition   Discharge    Condition   Stable    Comment   --               Joie Issa, APRN  [address] [address] [place]  [address] [place] [id number] [phone]    Schedule an appointment as soon as possible for a visit       Nabil Samaniego MD  [address] [place] [address] [place] [facility]  [phone]    Schedule an appointment as soon as possible for a visit            Where to Get Your Medications        These medications were sent to [facility]  [place], [place] - [address]   - [phone]  - [phone]  [address]  [place] [address]      Phone: [phone]   atorvastatin 80 MG tablet          Medication List      No changes were made to your prescriptions during this visit.            Rock Arora, APRN  02/25/25 0212

## 2025-02-26 ENCOUNTER — TELEPHONE (OUTPATIENT)
Dept: CARDIOLOGY | Facility: CLINIC | Age: 75
End: 2025-02-26

## 2025-02-26 DIAGNOSIS — R93.1 DECREASED CARDIAC EJECTION FRACTION: ICD-10-CM

## 2025-02-26 DIAGNOSIS — I42.8 OTHER CARDIOMYOPATHY: Primary | ICD-10-CM

## 2025-02-26 DIAGNOSIS — I25.118 CORONARY ARTERY DISEASE OF NATIVE ARTERY OF NATIVE HEART WITH STABLE ANGINA PECTORIS: ICD-10-CM

## 2025-02-26 PROBLEM — I42.9 MYOCARDIOPATHY: Status: ACTIVE | Noted: 2025-02-26

## 2025-02-26 NOTE — TELEPHONE ENCOUNTER
Hub staff attempted to follow warm transfer process and was unsuccessful     Caller: ELOISA MCCLELLAN    Relationship to patient: Emergency Contact    Best call back number: [phone]    Patient is needing: PATIENT'S WIFE ELOISA STATED THAT PATIENT WAS ADVISED FROM [facility] ED ON 02.24.25 THAT HE WOULD HAVE A HEART CATH DONE BY DR. GONG ON THURSDAY 02.27.25 AND THEY HAVE NOT HEARD ANYTHING ABOUT THE TIME FOR THE HEART CATH. PATIENT WAS ALSO ADVISED TO SCHEDULE AN APPOINTMENT WITH DR. GONG WITH NO SCHEDULING TIMEFRAME. ELOISA ASKS THAT SOMEONE CONTACT HER TO ADVISE. THANK YOU.

## 2025-02-27 ENCOUNTER — HOSPITAL ENCOUNTER (OUTPATIENT)
Facility: HOSPITAL | Age: 75
Setting detail: HOSPITAL OUTPATIENT SURGERY
Discharge: HOME | End: 2025-02-27
Attending: INTERNAL MEDICINE | Admitting: INTERNAL MEDICINE
Payer: MEDICARE

## 2025-02-27 VITALS
SYSTOLIC BLOOD PRESSURE: 151 MMHG | BODY MASS INDEX: 30.49 KG/M2 | OXYGEN SATURATION: 90 % | HEIGHT: 65 IN | TEMPERATURE: 98 F | RESPIRATION RATE: 15 BRPM | DIASTOLIC BLOOD PRESSURE: 68 MMHG | HEART RATE: 62 BPM | WEIGHT: 182.98 LBS

## 2025-02-27 DIAGNOSIS — I25.118 CORONARY ARTERY DISEASE OF NATIVE ARTERY OF NATIVE HEART WITH STABLE ANGINA PECTORIS: ICD-10-CM

## 2025-02-27 DIAGNOSIS — R93.1 DECREASED CARDIAC EJECTION FRACTION: ICD-10-CM

## 2025-02-27 DIAGNOSIS — I42.8 OTHER CARDIOMYOPATHY: ICD-10-CM

## 2025-02-27 LAB
ANION GAP SERPL CALCULATED.3IONS-SCNC: 11.6 MMOL/L (ref 5–15)
BASOPHILS # BLD AUTO: 0.13 10*3/MM3 (ref 0–0.2)
BASOPHILS NFR BLD AUTO: 1.3 % (ref 0–1.5)
BUN SERPL-MCNC: 21 MG/DL (ref 8–23)
BUN/CREAT SERPL: 21 (ref 7–25)
CALCIUM SPEC-SCNC: 10.3 MG/DL (ref 8.6–10.5)
CHLORIDE SERPL-SCNC: 105 MMOL/L (ref 98–107)
CO2 SERPL-SCNC: 26.4 MMOL/L (ref 22–29)
CREAT SERPL-MCNC: 1 MG/DL (ref 0.76–1.27)
DEPRECATED RDW RBC AUTO: 43.5 FL (ref 37–54)
EGFRCR SERPLBLD CKD-EPI 2021: 78.5 ML/MIN/1.73
EOSINOPHIL # BLD AUTO: 0.5 10*3/MM3 (ref 0–0.4)
EOSINOPHIL NFR BLD AUTO: 5 % (ref 0.3–6.2)
ERYTHROCYTE [DISTWIDTH] IN BLOOD BY AUTOMATED COUNT: 13.3 % (ref 12.3–15.4)
GLUCOSE SERPL-MCNC: 167 MG/DL (ref 65–99)
HCT VFR BLD AUTO: 44.1 % (ref 37.5–51)
HGB BLD-MCNC: 13.9 G/DL (ref 13–17.7)
IMM GRANULOCYTES # BLD AUTO: 0.05 10*3/MM3 (ref 0–0.05)
IMM GRANULOCYTES NFR BLD AUTO: 0.5 % (ref 0–0.5)
INR PPP: 1.08 (ref 0.9–1.1)
LYMPHOCYTES # BLD AUTO: 3.11 10*3/MM3 (ref 0.7–3.1)
LYMPHOCYTES NFR BLD AUTO: 30.8 % (ref 19.6–45.3)
MCH RBC QN AUTO: 28.5 PG (ref 26.6–33)
MCHC RBC AUTO-ENTMCNC: 31.5 G/DL (ref 31.5–35.7)
MCV RBC AUTO: 90.4 FL (ref 79–97)
MONOCYTES # BLD AUTO: 1.01 10*3/MM3 (ref 0.1–0.9)
MONOCYTES NFR BLD AUTO: 10 % (ref 5–12)
NEUTROPHILS NFR BLD AUTO: 5.3 10*3/MM3 (ref 1.7–7)
NEUTROPHILS NFR BLD AUTO: 52.4 % (ref 42.7–76)
NRBC BLD AUTO-RTO: 0 /100 WBC (ref 0–0.2)
PLATELET # BLD AUTO: 438 10*3/MM3 (ref 140–450)
PMV BLD AUTO: 10 FL (ref 6–12)
POTASSIUM SERPL-SCNC: 4.2 MMOL/L (ref 3.5–5.2)
PROTHROMBIN TIME: 13.9 SECONDS (ref 11.7–14.2)
RBC # BLD AUTO: 4.88 10*6/MM3 (ref 4.14–5.8)
SODIUM SERPL-SCNC: 143 MMOL/L (ref 136–145)
WBC NRBC COR # BLD AUTO: 10.1 10*3/MM3 (ref 3.4–10.8)

## 2025-02-27 PROCEDURE — C1769 GUIDE WIRE: HCPCS | Performed by: INTERNAL MEDICINE

## 2025-02-27 PROCEDURE — C1894 INTRO/SHEATH, NON-LASER: HCPCS | Performed by: INTERNAL MEDICINE

## 2025-02-27 PROCEDURE — 25010000002 MIDAZOLAM PER 1 MG: Performed by: INTERNAL MEDICINE

## 2025-02-27 PROCEDURE — 85025 COMPLETE CBC W/AUTO DIFF WBC: CPT | Performed by: INTERNAL MEDICINE

## 2025-02-27 PROCEDURE — 80048 BASIC METABOLIC PNL TOTAL CA: CPT | Performed by: INTERNAL MEDICINE

## 2025-02-27 PROCEDURE — 25010000002 FENTANYL CITRATE (PF) 100 MCG/2ML SOLUTION: Performed by: INTERNAL MEDICINE

## 2025-02-27 PROCEDURE — 25010000002 LIDOCAINE 2% SOLUTION: Performed by: INTERNAL MEDICINE

## 2025-02-27 PROCEDURE — 93459 L HRT ART/GRFT ANGIO: CPT | Performed by: INTERNAL MEDICINE

## 2025-02-27 PROCEDURE — 25510000001 IOPAMIDOL PER 1 ML: Performed by: INTERNAL MEDICINE

## 2025-02-27 PROCEDURE — 85610 PROTHROMBIN TIME: CPT | Performed by: INTERNAL MEDICINE

## 2025-02-27 PROCEDURE — 25810000003 SODIUM CHLORIDE 0.9 % SOLUTION: Performed by: INTERNAL MEDICINE

## 2025-02-27 RX ORDER — NITROGLYCERIN 0.4 MG/1
0.4 TABLET SUBLINGUAL
Status: DISCONTINUED | OUTPATIENT
Start: 2025-02-27 | End: 2025-02-27 | Stop reason: HOSPADM

## 2025-02-27 RX ORDER — IOPAMIDOL 755 MG/ML
INJECTION, SOLUTION INTRAVASCULAR
Status: DISCONTINUED | OUTPATIENT
Start: 2025-02-27 | End: 2025-02-27 | Stop reason: HOSPADM

## 2025-02-27 RX ORDER — ACETAMINOPHEN 325 MG/1
650 TABLET ORAL EVERY 4 HOURS PRN
Status: DISCONTINUED | OUTPATIENT
Start: 2025-02-27 | End: 2025-02-27 | Stop reason: HOSPADM

## 2025-02-27 RX ORDER — SODIUM CHLORIDE 9 MG/ML
INJECTION, SOLUTION INTRAVENOUS
Status: COMPLETED | OUTPATIENT
Start: 2025-02-27 | End: 2025-02-27

## 2025-02-27 RX ORDER — ISOSORBIDE MONONITRATE 30 MG/1
30 TABLET, EXTENDED RELEASE ORAL DAILY
Qty: 90 TABLET | Refills: 3 | Status: SHIPPED | OUTPATIENT
Start: 2025-02-27

## 2025-02-27 RX ORDER — MIDAZOLAM HYDROCHLORIDE 1 MG/ML
INJECTION, SOLUTION INTRAMUSCULAR; INTRAVENOUS
Status: DISCONTINUED | OUTPATIENT
Start: 2025-02-27 | End: 2025-02-27 | Stop reason: HOSPADM

## 2025-02-27 RX ORDER — SODIUM CHLORIDE 9 MG/ML
3 INJECTION, SOLUTION INTRAVENOUS CONTINUOUS
Status: DISPENSED | OUTPATIENT
Start: 2025-02-27 | End: 2025-02-27

## 2025-02-27 RX ORDER — FENTANYL CITRATE 50 UG/ML
INJECTION, SOLUTION INTRAMUSCULAR; INTRAVENOUS
Status: DISCONTINUED | OUTPATIENT
Start: 2025-02-27 | End: 2025-02-27 | Stop reason: HOSPADM

## 2025-02-27 RX ORDER — SACUBITRIL AND VALSARTAN 49; 51 MG/1; MG/1
1 TABLET, FILM COATED ORAL 2 TIMES DAILY
Qty: 60 TABLET | Refills: 11 | Status: SHIPPED | OUTPATIENT
Start: 2025-02-27

## 2025-02-27 RX ORDER — METOPROLOL SUCCINATE 25 MG/1
25 TABLET, EXTENDED RELEASE ORAL DAILY
Qty: 30 TABLET | Refills: 11 | Status: SHIPPED | OUTPATIENT
Start: 2025-02-27

## 2025-02-27 RX ORDER — AMLODIPINE BESYLATE 2.5 MG/1
2.5 TABLET ORAL DAILY
Qty: 30 TABLET | Refills: 11 | Status: SHIPPED | OUTPATIENT
Start: 2025-02-27

## 2025-02-27 RX ORDER — SODIUM CHLORIDE 9 MG/ML
250 INJECTION, SOLUTION INTRAVENOUS ONCE AS NEEDED
Status: DISPENSED | OUTPATIENT
Start: 2025-02-27 | End: 2025-02-27

## 2025-02-27 RX ORDER — LIDOCAINE HYDROCHLORIDE 20 MG/ML
INJECTION, SOLUTION INFILTRATION; PERINEURAL
Status: DISCONTINUED | OUTPATIENT
Start: 2025-02-27 | End: 2025-02-27 | Stop reason: HOSPADM

## 2025-03-03 NOTE — H&P
Cardiology H&P      Patient Care Team:  Joie Issa APRN as PCP - General (Nurse Practitioner)  Joie Issa APRN (Nurse Practitioner)    CHIEF COMPLAINT: Cardiomyopathy    HISTORY OF PRESENT ILLNESS:    Seen and examined  Here for left heart catheterization coronary and native bypass graft angiography  Indication is reduced EF 25 to 30%, previously 45 to 50%  Chest pain-free on encounter  Risk and benefits discussed including death heart attack stroke pain bleeding infection need for vascular or cardiovascular surgery were discussed and he wishes to proceed    Patient had hospitalization previously demonstrating EF to be significantly reduced from prior known EF.  Needs ischemic evaluation for any progression of his native for bypass graft CAD    Medicines reviewed  No contrast anticoagulation antiplatelet contraindications  Heart rates reviewed  Hemodynamics reviewed    Review of systems otherwise negative x 14 point review of systems except as mentioned above  Historical data copied forward from previous encounters in EMR is unchanged    Past Medical History:   Diagnosis Date    Coronary artery disease     CVA (cerebral vascular accident) 02/16/2025    Diabetes mellitus     Hyperlipidemia     Hypertension      Past Surgical History:   Procedure Laterality Date    CARDIAC CATHETERIZATION      2015    CARDIAC CATHETERIZATION N/A 08/10/2022    Procedure: Left Heart Cath;  Surgeon: Nabil Samaniego MD;  Location: [facility] CATH INVASIVE LOCATION;  Service: Cardiology;  Laterality: N/A;    CARDIAC CATHETERIZATION N/A 2/27/2025    Procedure: Left Heart Cath;  Surgeon: Nabil Samaniego MD;  Location:  [facility] CATH INVASIVE LOCATION;  Service: Cardiology;  Laterality: N/A;    CAROTID ENDARTERECTOMY      CAROTID STENT  2015    PCI  to SVG supplying the LC/OM    CORONARY ARTERY BYPASS GRAFT      1998    CORONARY STENT PLACEMENT       Family History   Problem Relation Age of Onset    Heart disease  "Mother     Hypertension Mother      Social History     Tobacco Use    Smoking status: Former     Types: Cigarettes     Start date: 1975    Smokeless tobacco: Never   Vaping Use    Vaping status: Never Used   Substance Use Topics    Alcohol use: Never    Drug use: Never     No medications prior to admission.     Allergies:  Statins    REVIEW OF SYSTEMS:  Please see the above history of present illness for pertinent positives and negatives.  The remainder of the patient's systems have been reviewed and are negative.     Vital Signs       Flowsheet Rows      Flowsheet Row First Filed Value   Admission Height 165.1 cm (65\") Documented at 02/27/2025 0934   Admission Weight 83 kg (182 lb 15.7 oz) Documented at 02/27/2025 0934             Physical Exam:  Physical Exam   Constitutional: Patient appears well-developed and well-nourished and in no acute distress   HEENT:   Head: Normocephalic and atraumatic.   Eyes:  Pupils are equal, round, and reactive to light. EOM are intact. Sclerae are anicteric and noninjected.  Mouth and Throat: Patient has moist mucous membranes. Oropharynx is clear of any erythema or exudate.     Neck: Neck supple. No JVD present. No thyromegaly present. No lymphadenopathy present.  Cardiovascular: Regular rate, regular rhythm, S1 normal and S2 normal.  Exam reveals no gallop and no friction rub.  No murmur heard.  Pulmonary/Chest: Lungs are clear to auscultation bilaterally. No respiratory distress. No wheezes. No rhonchi. No rales.   Abdominal: Soft. Bowel sounds are normal. No distension and no mass. There is no hepatosplenomegaly. There is no tenderness.   Musculoskeletal: Normal muscle tone  Extremities: No edema. Pulses are palpable in all 4 extremities.  Neurological: Patient is alert and oriented to person, place, and time. Cranial nerves II-XII are grossly intact with no focal deficits.  Skin: Skin is warm. No rash noted. Nails show no clubbing.  No cyanosis or erythema.     Results Review: "    I reviewed the patient's new clinical results.  Lab Results (most recent)       Procedure Component Value Units Date/Time    Basic Metabolic Panel [[id number]]  (Abnormal) Collected: 02/27/25 0918    Specimen: Blood Updated: 02/27/25 1001     Glucose 167 mg/dL      BUN 21 mg/dL      Creatinine 1.00 mg/dL      Sodium 143 mmol/L      Potassium 4.2 mmol/L      Chloride 105 mmol/L      CO2 26.4 mmol/L      Calcium 10.3 mg/dL      BUN/Creatinine Ratio 21.0     Anion Gap 11.6 mmol/L      eGFR 78.5 mL/min/1.73     Narrative:      GFR Categories in Chronic Kidney Disease (CKD)      GFR Category          GFR (mL/min/1.73)    Interpretation  G1                     90 or greater         Normal or high (1)  G2                      60-89                Mild decrease (1)  G3a                   45-59                Mild to moderate decrease  G3b                   30-44                Moderate to severe decrease  G4                    15-29                Severe decrease  G5                    14 or less           Kidney failure          (1)In the absence of evidence of kidney disease, neither GFR category G1 or G2 fulfill the criteria for CKD.    eGFR calculation 2021 CKD-EPI creatinine equation, which does not include race as a factor    Protime-INR [[id number]]  (Normal) Collected: 02/27/25 0918    Specimen: Blood Updated: 02/27/25 0942     Protime 13.9 Seconds      INR 1.08    CBC & Differential [[id number]]  (Abnormal) Collected: 02/27/25 0918    Specimen: Blood Updated: 02/27/25 0927    Narrative:      The following orders were created for panel order CBC & Differential.  Procedure                               Abnormality         Status                     ---------                               -----------         ------                     CBC Auto Differential[[id number]]        Abnormal            Final result                 Please view results for these tests on the individual orders.    CBC Auto Differential  [[id number]]  (Abnormal) Collected: 02/27/25 0918    Specimen: Blood Updated: 02/27/25 0927     WBC 10.10 10*3/mm3      RBC 4.88 10*6/mm3      Hemoglobin 13.9 g/dL      Hematocrit 44.1 %      MCV 90.4 fL      MCH 28.5 pg      MCHC 31.5 g/dL      RDW 13.3 %      RDW-SD 43.5 fl      MPV 10.0 fL      Platelets 438 10*3/mm3      Neutrophil % 52.4 %      Lymphocyte % 30.8 %      Monocyte % 10.0 %      Eosinophil % 5.0 %      Basophil % 1.3 %      Immature Grans % 0.5 %      Neutrophils, Absolute 5.30 10*3/mm3      Lymphocytes, Absolute 3.11 10*3/mm3      Monocytes, Absolute 1.01 10*3/mm3      Eosinophils, Absolute 0.50 10*3/mm3      Basophils, Absolute 0.13 10*3/mm3      Immature Grans, Absolute 0.05 10*3/mm3      nRBC 0.0 /100 WBC             Imaging Results (Most Recent)       None          Okay to proceed safely today  Further recommendations to follow findings and clinical course    Nabil Samaniego MD, PhD

## 2025-03-04 NOTE — PROGRESS NOTES
Cardiology Clinic Note  Nabil Samaniego MD, PhD    Subjective:     Encounter Date:02/24/2025      Patient ID: Luiz Connors is a 74 y.o. male.    Chief Complaint:  Chief Complaint   Patient presents with    Hospital Follow Up Visit       HPI:        I had the pleasure to see this 73-year-old in clinic today in follow-up.  He has history of bypass surgery remotely 1998, PCI to the distal portion of the saphenous vein graft to a bifurcating obtuse marginal branch 2011 and repeat in 2022, hypertensive heart disease hyperlipidemia diabetes.  Secondary to recurrent chest pain after intervention 2022  patient underwent invasive ischemic evaluation demonstrating widely patent STOUT to LAD, patent native RCA small caliber mild luminal regularities diffusely but otherwise patent with PENG-3 flow, saphenous vein graft to obtuse marginal appears to have 50% aorto ostial takeoff narrowing but with adequate flow and no pressure diminution on catheter engagement, there is nonobstructive disease 30 to 40% diffusely throughout, the distal anastomotic portion of this graft has a stent with 40% in-stent restenosis however the distal edge has significant 70 to 80 perhaps even 90 % stenosis at the anastomosis into a bifurcating obtuse marginal branch of which the superior limb has 90% stenosis most severely in the view, the inferior limb 70% and are both small caliber vessels 2 to 2.5 mm in diameter.  There is also retrograde perfusion through the ascending/ retrograde limb of the obtuse marginal back to the circumflex which gives PLV branches along the inferolateral wall.  The left main is completely occluded providing no collateral flow and he is completely dependent on his vein graft anastomosis for the lateral wall.   Diagonal has retrograde perfusion for LIMA to LAD which was widely patent again.  He has preserved EF at 60 to 65%.  Given difficulty with IFR and FFR measurements given small distal caliber size multiple runoff  distributions ultimately he underwent nuclear perfusion study demonstrating reversibility of the lateral and inferolateral wall under stress conditions indicating significant contribution of distal anastomotic disease as well as superior and inferior limb proximal stenosis.  Stress September 2020 demonstrated lateral and inferolateral wall ischemia indicating vein graft significantly stenosed with physiologic limitation of myocardial supply.  Subsequently,  Patient was evaluated by [facility] and underwent cardiac catheterization on November 23, 2022.  He underwent IVUS guided  revascularization and PCI of the left circumflex with overlapping 3.0 x 15 mm, 3.5 x 33 mm, and 4.0 x 15 mm Xience stan point drug-eluting stents.        Patient has done well over the last couple of years however he presented last clinic back with dyspnea on exertion and chest heaviness and exertional angina.  He has started over the last few weeks not quite as bad as he formerly experienced but of similar quality and location.  On exam he also has a left carotid bruit notably.  No fevers chill sweats nausea vomiting or diarrhea  Follow-up stress testing with no reversible ischemia, lateral wall infarct but EF was reduced at 32% however he had severely hypertensive response.  We discussed we verification with 2D echo for EF and escalation of his pharmacotherapy if EF is now down from 45 to 50% from 2022.  No peripheral edema PND orthopnea, chest pain is stable.  He continues on maximal pharmacotherapy today without new issues.  H     Exam  Vitals reviewed below  Regular rate and rhythm with no rubs gallops heave or lift, 1 out of 6 systolic ejection murmur left sternal border stable  Left greater than right carotid bruit present on exam  No JVD HJR,   No edema  No clubbing cyanosis  Intact grossly  Clear to auscultation  Soft nontender nondistended        Assessment and plan today  Independently manage medical  "conditions    Multivessel CAD  Status post left main and to circumflex  revascularization [facility]  Essential hypertension  PVD with carotid bruits  Hyperlipidemia  Statin intolerance  History of chronic stable angina  Type 2 diabetes     stress July 2024 revealed EF of 36% with fixed mid to basal lateral wall defect without reversibility cannot rule out lateral wall infarct, global hypokinesis, severely hypertensive response up to 220 systolic, stress ECG with no ischemic ST-T wave findings at submaximal stress  Status post  revascularization left main and circumflex, [facility] 2022 continue aspirin Plavix     Follow-up echo has been ordered, stress EF 36% which is down from 45 to 50% previously, possibly hypertension related  Outside hospital echo down to 25% with prior hospitalization  Left heart catheterization scheduled for evaluation of coronaries and patency of bypass grafts  Will try to further optimize goal-directed medical therapy  Continue DAPT     Carotid Dopplers 2024 evaluate left carotid bruit, less than 50% stenosis, status post right carotid endarterectomy with no recurrent disease     Diabetes control per primary     On gemfibrozil , does not want PCSK9's, did not tolerate bempedoic acid  Tolerating atorvastatin, will refill and increase to 80 daily goal LDL less than 55     Follow-up labs     Change to entresto as able  Will consider aldactone      Secondary prevention goals  Diet and exercise per AHA guidelines  Goal-directed medical therapy on with antiplatelet therapy, lipid-lowering therapy per guidelines, afterload reduction, diabetes treatment       Further recommendation follow findings and clinical course    Nabil Samaniego MD, PhD    Objective:         /93 (BP Location: Left arm, Patient Position: Sitting)   Pulse 76   Resp 18   Ht 165.1 cm (65\")   Wt 85.7 kg (189 lb)   SpO2 90%   BMI 31.45 kg/m²             The pleasure to be involved in this patient's " cardiovascular care.  Please call with any questions or concerns  Nabil Samaniego MD, PhD    Most recent EKG as reviewed and interpreted by me:  Procedures     Most recent echo as reviewed and interpreted by me:  Results for orders placed during the hospital encounter of 02/13/25    Adult Transthoracic Echo Complete W/ Cont if Necessary Per Protocol (With Agitated Saline)    Interpretation Summary    Left ventricular systolic function is moderately decreased. Calculated left ventricular EF = 29% Left ventricular ejection fraction appears to be 26 - 30%.    The left ventricular cavity is moderately dilated.    Left ventricular diastolic function is consistent with (grade III w/high LAP) reversible restrictive pattern.    Mildly reduced right ventricular systolic function noted.    The left atrial cavity is dilated.    Saline test results are negative for right to left atrial level shunt.    Moderate mitral valve regurgitation is present.    Estimated right ventricular systolic pressure from tricuspid regurgitation is normal (<35 mmHg).      Most recent stress test as reviewed and interpreted by me:  Results for orders placed during the hospital encounter of 07/12/24    Stress Test With Myocardial Perfusion One Day    Interpretation Summary    Left ventricular ejection fraction is moderately reduced (Calculated EF = 36%).    Abnormal LV wall motion consistent with severe hypokinesis and global hypokinesis.    Myocardial perfusion imaging indicates a small-to-moderate-sized infarct located in the lateral wall with no significant ischemia noted.    There is no prior study available for comparison. There is fixed mid to basal lateral wall defect without significant reversibility, cannot r/o small prior lateral infarct EF globally reduced 36% with enlarged ventricle.    Findings consistent with a normal ECG stress test.    Possible old moderate middle lateral wall infarct, no significant reversibility identified in this  region  Severely hypertensive response greater than 220 systolic  Globally reduced EF 36%  Global hypokinesis  Stress ECG with no ischemic ST-T wave changes      Most recent cardiac catheterization as reviewed interpreted by me:  Results for orders placed during the hospital encounter of 08/10/22    Cardiac Catheterization/Vascular Study    Narrative   Nabil Samaniego MD, PhD  [facility] cardiology  Date of service 8-    Procedure  1.  Left heart catheterization with coronary angiography of native and bypass grafts, LV gram    Indication  Recurrent anginal chest pain    After informed consent patient is brought to the Cath Lab sterilely prepped and draped in usual fashion expose the right groin for right common femoral arterial access via micropuncture modified Seldinger technique with placement of a 6 Somali sheath.  035 guidewire was advanced aortic valve followed by diagnostic JL 4 and JR4 catheters for selective left and right coronary angiography as well as bypass graft with SVG to OM and LIMA to LAD.  JR4 was used across aortic valve followed by hand-injection for LV gram, EDP assessment and pullback assessment of the transaortic valve gradient.  All catheters equipment removed.  Sheath flushed with heparinized saline to be removed by manual pressure.  He left Cath Lab chest pain-free hemodynamically electrically stable and talking to staff neurologically grossly tight bilaterally    Findings  1 opening aortic pressure was 180/97  2.  Closing pressure was 162/87  3.  LVEDP 10-12  4.  LV function low normal 50 to 55% globally  5.  Normal transaortic valve gradient    Angiography  1 left main   Over 2 LAD proximally , perfused by widely patent LIMA to LAD with retrograde flow widely patent flow retrogradely to the diagonal branch across the anterolateral wall, there is no anastomotic disease, no downstream disease or even retrograde disease significantly with only mild diffuse  luminal regularities, highly functional LIMA to LAD graft  3.  Circumflex ostial proximal , perfused by SVG to second obtuse marginal branch.  There is a patent stent in the proximal portion with ostial 50% narrowing in a very large caliber graft, there is copious contrast E flux surrounding the diagnostic catheter from the ostial portion of the graft into the aorta, there is diffuse luminal regularities throughout the graft, mild degeneration 20 to 30%, there is a patent stent to the distal portion of the graft just immediately prior to anastomosis there is otherwise widely patent.  Immediately after this stent terminates there is a short 60% to 70% narrowing at the anastomosis of the graft to the native coronary artery.,  There is retrograde flow from the anastomosis to the 2 obtuse marginal branches superior and inferior limbs as well as retrogradely to the circumflex as well as in the AV groove for continuation circumflex and L PLV branch.  This single graft supplies the entire lateral wall and inferolateral wall.,  There is no disease in the native circulation other than mild luminal irregularities  4.  RCA small, codominant, there is ostial 50% narrowing, diffuse atherosclerotic disease of 30 to 40% but no focal stenotic lesion or flow limitation, small vessel disease distally is seen but not amenable to intervention secondary to small caliber vessel size in the PLV and PDA    Patent SVG to obtuse marginal, patent stents in this as described, concern over anastomotic disease and narrowing given the large myocardial supply of the entire lateral wall however there is PENG-3 flow antegrade and retrogradely,    Widely patent LIMA to LAD with no disease    Conclusions recommendations  1.  Anginal chest pain, uncontrolled blood pressure with stage II hypertension, today was 180 systolic on entering the Cath Lab, even while sleeping was above 165 systolic  2.  SVG to obtuse marginal branch has anastomotic  narrowing but does not appear critical but does have a extremely large myocardial supply of the entire lateral wall    Intervention to the anastomosis of the SVG to obtuse marginal branch with jailed the retrograde limb back to the circumflex and to the inferolateral wall with L PLV segment in the AV groove which is sizable as well as likely the superior limb of the obtuse marginal, there is a high chance of diminishing graft functionality over time in this location as well as high chance of in-stent restenosis at this anastomotic location    We will discharge him today, advance his antianginals as well as his antihypertensives including his amlodipine, start Imdur.  We will bring him back in 2 to 4 weeks for Lexiscan stress to evaluate regional ischemia of the lateral wall if present will reevaluate best optimal strategy for lateral wall revascularization or control of his angina once blood pressure is also controlled    Patient is amenable for this conservative approach  He will be continued on aspirin and Plavix in addition to high intensity statin therapy, no beta-blocker secondary to heart rates in the 60s    Nabil Samaniego MD, PhD    The following portions of the patient's history were reviewed and updated as appropriate: allergies, current medications, past family history, past medical history, past social history, past surgical history, and problem list.      ROS:  14 point review of systems negative except as mentioned above    Current Outpatient Medications:     acetaminophen (TYLENOL) 325 MG tablet, Take 2 tablets by mouth Every 12 (Twelve) Hours As Needed for Mild Pain., Disp: , Rfl:     aspirin 81 MG EC tablet, Take 1 tablet by mouth Daily for 30 days., Disp: 30 tablet, Rfl: 0    atorvastatin (LIPITOR) 80 MG tablet, Take 1 tablet by mouth Every Night., Disp: 90 tablet, Rfl: 3    clopidogrel (PLAVIX) 75 MG tablet, Take 1 tablet by mouth Daily., Disp: 30 tablet, Rfl: 0    glipizide (GLUCOTROL) 5 MG  tablet, Take 0.5 tablets by mouth Daily., Disp: , Rfl:     metFORMIN ER (GLUCOPHAGE-XR) 500 MG 24 hr tablet, 2 tablets Daily With Breakfast., Disp: , Rfl:     nitroglycerin (NITROSTAT) 0.4 MG SL tablet, Place 1 tablet under the tongue Every 5 (Five) Minutes As Needed for Chest Pain. Prn chest pain, Disp: 100 tablet, Rfl: 3    triamcinolone (KENALOG) 0.1 % cream, Apply  topically to the appropriate area as directed., Disp: , Rfl:     amLODIPine (NORVASC) 2.5 MG tablet, Take 1 tablet by mouth Daily., Disp: 30 tablet, Rfl: 11    isosorbide mononitrate (IMDUR) 30 MG 24 hr tablet, Take 1 tablet by mouth Daily., Disp: 90 tablet, Rfl: 3    metoprolol succinate XL (TOPROL-XL) 25 MG 24 hr tablet, Take 1 tablet by mouth Daily., Disp: 30 tablet, Rfl: 11    sacubitril-valsartan (Entresto) 49-51 MG tablet, Take 1 tablet by mouth 2 (Two) Times a Day., Disp: 60 tablet, Rfl: 11    Problem List:  Patient Active Problem List   Diagnosis    Influenza A    Acute ischemic VBA thalamic stroke, left    Acute ischemic stroke    Bilateral carotid artery stenosis    Bradycardia    Coronary artery disease    Status post coronary artery bypass graft    Essential hypertension    Hyperlipidemia, mixed    Type 2 diabetes mellitus    Body fluid retention    Angina pectoris    Acute poliomyelitis    H/O: pneumonia    History of CEA (carotid endarterectomy)    S/P PTCA (percutaneous transluminal coronary angioplasty)    Myocardiopathy    Decreased cardiac ejection fraction     Past Medical History:  Past Medical History:   Diagnosis Date    Coronary artery disease     CVA (cerebral vascular accident) 02/16/2025    Diabetes mellitus     Hyperlipidemia     Hypertension      Past Surgical History:  Past Surgical History:   Procedure Laterality Date    CARDIAC CATHETERIZATION      2015    CARDIAC CATHETERIZATION N/A 08/10/2022    Procedure: Left Heart Cath;  Surgeon: Nabil Samaniego MD;  Location: [facility] CATH INVASIVE LOCATION;  Service:  Cardiology;  Laterality: N/A;    CARDIAC CATHETERIZATION N/A 2/27/2025    Procedure: Left Heart Cath;  Surgeon: Nabil Samaniego MD;  Location: [facility] CATH INVASIVE LOCATION;  Service: Cardiology;  Laterality: N/A;    CAROTID ENDARTERECTOMY      CAROTID STENT  2015    PCI  to SVG supplying the LC/OM    CORONARY ARTERY BYPASS GRAFT      1998    CORONARY STENT PLACEMENT       Social History:  Social History     Socioeconomic History    Marital status:    Tobacco Use    Smoking status: Former     Types: Cigarettes     Start date: 1975    Smokeless tobacco: Never   Vaping Use    Vaping status: Never Used   Substance and Sexual Activity    Alcohol use: Never    Drug use: Never    Sexual activity: Not Currently     Partners: Female     Birth control/protection: Post-menopausal     Allergies:  Allergies   Allergen Reactions    Statins Cough     Severe muscle aches/spasms     Immunizations:  Immunization History   Administered Date(s) Administered    Pneumococcal Polysaccharide (PPSV23) 02/24/2017, 02/24/2017    TD Preservative Free (Tenivac) 05/07/2015, 05/07/2015            In-Office Procedure(s):  No orders to display        ASCVD RIsk Score::  The ASCVD Risk score (Tiffany LANCE, et al., 2019) failed to calculate for the following reasons:    Risk score cannot be calculated because patient has a medical history suggesting prior/existing ASCVD    Imaging:    Results for orders placed during the hospital encounter of 02/13/25    XR Spine Cervical 2 or 3 View    Narrative  XR SPINE CERVICAL 2 OR 3 VW    Date of Exam: 2/13/2025 8:10 AM EST    Indication: eval hardware    Comparison: None available.    Findings:  No acute fracture is identified. The alignment is anatomic. There are changes from C4 corpectomy with cage body graft with anterior cervical discectomy and fusion at C3-C5. One or both of the C3 screws are fractured. There are moderate discogenic changes  at C5-6 and C6-7. There is moderate scattered  facet arthropathy and uncovertebral hypertrophy. The prevertebral soft tissues are unremarkable.    Impression  Impression:  1.Changes from C4 corpectomy with ACDF at C3-C5. One or both of the C3 screws are fractured.  2.Multilevel degenerative changes as described above.        Electronically Signed: Demario Bettencourt MD  2/13/2025 8:51 AM EST  Workstation ID: [id number]       Results for orders placed during the hospital encounter of 02/13/25    US Renal Bilateral    Narrative  US RENAL BILATERAL    Date of Exam: 2/13/2025 6:41 PM EST    Indication: RAÚL.    Comparison: No comparisons available.    Technique: Grayscale and color Doppler ultrasound evaluation of the kidneys and urinary bladder was performed.      Findings:  Right Kidney: Right kidney measures 11.1 cm in long axis. Renal cortical thickness and echogenicity are within normal limits.No suspicious appearing lesions.No hydronephrosis.No sonographically evident nephrolithiasis.    Left Kidney:  Left kidney measures 12.2 cm in long axis. Renal cortical thickness and echogenicity are within normal limits.No suspicious appearing lesions.. There is a simple appearing left renal cyst measuring up to 1.5 cm. No hydronephrosis.No  sonographically evident nephrolithiasis.    Bladder: The bladder appears unremarkable.    Impression  Impression:  No hydronephrosis or sonographically evident nephrolithiasis.        Electronically Signed: Nestor Elaine  2/13/2025 7:02 PM EST  Workstation ID: [id number]      Results for orders placed during the hospital encounter of 02/13/25    CT Head Without Contrast    Narrative  CT HEAD WO CONTRAST    Date of Exam: 2/13/2025 7:12 AM EST    Indication: ams.    Comparison: None available.    Technique: Axial CT images were obtained of the head without contrast administration.  Coronal reconstructions were performed.  Automated exposure control and iterative reconstruction methods were used.      Findings:  There is no intracranial  hemorrhage. No mass effect or midline shift. There is an area of encephalomalacia involving the right posterior temporal lobe related to sequela of remote infarct. Mild white matter findings suggesting chronic microvascular  disease. No intraventricular hemorrhage. The posterior fossa is without acute abnormality. Globes are intact and symmetric. Slight rightward deviation of the nasal septum. The mastoid air cells are well-aerated. There is no sinus fluid level. No acute  calvarial fracture. Cervical ACDF hardware noted at the upper cervical spine on the  radiograph with corpectomy cage. C3 screw appears fractured.    Impression  Impression:  1. No acute intracranial abnormality.  2. Remote right posterior temporal infarct with encephalomalacia.  3. Cervical ACDF hardware partially imaged on  radiograph. C3 screw appears fractured, cervical radiographs may be helpful to better assess.          Electronically Signed: Demetrio Hall MD  2/13/2025 7:31 AM EST  Workstation ID: [id number]      Lab Review:   Admission on 02/24/2025, Discharged on 02/24/2025   Component Date Value    WBC 02/24/2025 12.13 (H)     RBC 02/24/2025 4.79     Hemoglobin 02/24/2025 13.8     Hematocrit 02/24/2025 43.0     MCV 02/24/2025 89.8     MCH 02/24/2025 28.8     MCHC 02/24/2025 32.1     RDW 02/24/2025 13.2     RDW-SD 02/24/2025 44.0     MPV 02/24/2025 10.1     Platelets 02/24/2025 429     Neutrophil % 02/24/2025 53.4     Lymphocyte % 02/24/2025 29.1     Monocyte % 02/24/2025 9.5     Eosinophil % 02/24/2025 6.5 (H)     Basophil % 02/24/2025 0.8     Immature Grans % 02/24/2025 0.7 (H)     Neutrophils, Absolute 02/24/2025 6.47     Lymphocytes, Absolute 02/24/2025 3.53 (H)     Monocytes, Absolute 02/24/2025 1.15 (H)     Eosinophils, Absolute 02/24/2025 0.79 (H)     Basophils, Absolute 02/24/2025 0.10     Immature Grans, Absolute 02/24/2025 0.09 (H)     nRBC 02/24/2025 0.0     Extra Tube 02/24/2025 Hold for add-ons.     Extra Tube  02/24/2025 Hold for add-ons.     PTT 02/24/2025 26.5     Protime 02/24/2025 14.0     INR 02/24/2025 1.08     Glucose 02/24/2025 119 (H)     BUN 02/24/2025 14     Creatinine 02/24/2025 0.74 (L)     Sodium 02/24/2025 140     Potassium 02/24/2025 4.3     Chloride 02/24/2025 101     CO2 02/24/2025 28.0     Calcium 02/24/2025 10.3     Total Protein 02/24/2025 8.3     Albumin 02/24/2025 4.1     ALT (SGPT) 02/24/2025 16     AST (SGOT) 02/24/2025 29     Alkaline Phosphatase 02/24/2025 59     Total Bilirubin 02/24/2025 0.3     Globulin 02/24/2025 4.2     A/G Ratio 02/24/2025 1.0     BUN/Creatinine Ratio 02/24/2025 18.9     Anion Gap 02/24/2025 11.0     eGFR 02/24/2025 94.5    No results displayed because visit has over 200 results.      Hospital Outpatient Visit on 09/20/2024   Component Date Value    LVIDd 09/20/2024 6.2     LVIDs 09/20/2024 5.0     IVSd 09/20/2024 1.19     LVPWd 09/20/2024 1.19     FS 09/20/2024 19.9     IVS/LVPW 09/20/2024 1.01     ESV(cubed) 09/20/2024 123.7     LV Sys Vol (BSA correcte* 09/20/2024 49.1     EDV(cubed) 09/20/2024 240.8     LV Partida Vol (BSA correct* 09/20/2024 74.5     LV mass(C)d 09/20/2024 329.6     LVOT area 09/20/2024 3.1     LVOT diam 09/20/2024 1.98     EDV(MOD-sp4) 09/20/2024 145.1     ESV(MOD-sp4) 09/20/2024 95.6     SV(MOD-sp4) 09/20/2024 49.4     SVi(MOD-SP4) 09/20/2024 25.4     SVi (LVOT) 09/20/2024 37.8     EF(MOD-sp4) 09/20/2024 35.0     MV E max kaiser 09/20/2024 84.2     MV A max kaiser 09/20/2024 69.2     MV dec time 09/20/2024 0.29     MV E/A 09/20/2024 1.22     Pulm A Revs Dur 09/20/2024 0.15     TR max kaiser 09/20/2024 309.1     SV(LVOT) 09/20/2024 73.7     LA dimension (2D)  09/20/2024 5.1     Pulm Sys Kaiser 09/20/2024 56.4     Pulm Partida Kaiser 09/20/2024 74.8     Pulm S/D 09/20/2024 0.75     Pulm A Revs Kaiser 09/20/2024 18.6     LV V1 max 09/20/2024 92.8     LV V1 max PG 09/20/2024 3.4     LV V1 mean PG 09/20/2024 1.71     LV V1 VTI 09/20/2024 24.1     Ao pk kaiser 09/20/2024 194.7      Ao max PG 09/20/2024 15.2     Ao mean PG 09/20/2024 7.7     Ao V2 VTI 09/20/2024 47.3     MAGI(I,D) 09/20/2024 1.56     AI P1/2t 09/20/2024 664.0     MV max PG 09/20/2024 3.9     MV mean PG 09/20/2024 1.15     MV V2 VTI 09/20/2024 39.8     MVA(VTI) 09/20/2024 1.85     MV dec slope 09/20/2024 293.8     MR max justine 09/20/2024 535.1     MR max PG 09/20/2024 114.6     TR max PG 09/20/2024 41.0     RVSP(TR) 09/20/2024 44.0     RAP systole 09/20/2024 3.0     PA V2 max 09/20/2024 94.6     PA acc time 09/20/2024 0.11     PI end-d justine 09/20/2024 82.3     Ao root diam 09/20/2024 2.33     ACS 09/20/2024 1.38     EF(MOD-bp) 09/20/2024 40.0     RV Base 09/20/2024 4.00     RV Mid 09/20/2024 1.90     TAPSE (>1.6) 09/20/2024 1.90     Echo EF Estimated 09/20/2024 40.0      Recent labs reviewed and interpreted for clinical significance and application            Level of Care:           Nabil Samaniego MD  03/04/25  .

## 2025-03-06 ENCOUNTER — READMISSION MANAGEMENT (OUTPATIENT)
Dept: CALL CENTER | Facility: HOSPITAL | Age: 75
End: 2025-03-06
Payer: MEDICARE

## 2025-03-06 NOTE — OUTREACH NOTE
Stroke Week 3 Survey      Flowsheet Row Responses   [facility] facility patient discharged from? [facility]   Does the patient have one of the following disease processes/diagnoses(primary or secondary)? Stroke   Week 3 attempt successful? No   Unsuccessful attempts Attempt 1            Stacie Kennedy Registered Nurse

## 2025-03-12 ENCOUNTER — READMISSION MANAGEMENT (OUTPATIENT)
Dept: CALL CENTER | Facility: HOSPITAL | Age: 75
End: 2025-03-12
Payer: MEDICARE

## 2025-03-12 NOTE — OUTREACH NOTE
Stroke Week 3 Survey      Flowsheet Row Responses   [facility] facility patient discharged from? [facility]   Does the patient have one of the following disease processes/diagnoses(primary or secondary)? Stroke   Week 3 attempt successful? No   Unsuccessful attempts Attempt 2            BRIDGETTE DILLARD - Registered Nurse

## 2025-03-19 LAB
CV ZIO BASELINE AVG BPM: 68 BPM
CV ZIO BASELINE BPM HIGH: 188 BPM
CV ZIO BASELINE BPM LOW: 45 BPM
CV ZIO DEVICE ANALYSIS TIME: NORMAL
CV ZIO ECT SVE COUNT: NORMAL EPISODES
CV ZIO ECT SVE CPLT COUNT: 1241 EPISODES
CV ZIO ECT SVE CPLT FREQ: NORMAL
CV ZIO ECT SVE FREQ: NORMAL
CV ZIO ECT SVE TPLT COUNT: 149 EPISODES
CV ZIO ECT SVE TPLT FREQ: NORMAL
CV ZIO ECT VE COUNT: 6856 EPISODES
CV ZIO ECT VE CPLT COUNT: 106 EPISODES
CV ZIO ECT VE CPLT FREQ: NORMAL
CV ZIO ECT VE FREQ: NORMAL
CV ZIO ECT VE TPLT COUNT: 3 EPISODES
CV ZIO ECT VE TPLT FREQ: NORMAL
CV ZIO ECTOPIC SVE COUPLET RAW PERCENT: 0.23 %
CV ZIO ECTOPIC SVE ISOLATED PERCENT: 3.95 %
CV ZIO ECTOPIC SVE TRIPLET RAW PERCENT: 0.04 %
CV ZIO ECTOPIC VE COUPLET RAW PERCENT: 0.02 %
CV ZIO ECTOPIC VE ISOLATED PERCENT: 0.65 %
CV ZIO ECTOPIC VE TRIPLET RAW PERCENT: 0 %
CV ZIO ENROLLMENT END: NORMAL
CV ZIO ENROLLMENT START: NORMAL
CV ZIO L BIGEMINY DUR: 9 SEC
CV ZIO L BIGEMINY END: NORMAL
CV ZIO L BIGEMINY START: NORMAL
CV ZIO L TRIGEMINY DUR: 19.3 SEC
CV ZIO L TRIGEMINY END: NORMAL
CV ZIO L TRIGEMINY START: NORMAL
CV ZIO PATIENT EVENTS DIARIES: 0
CV ZIO PATIENT EVENTS TRIGGERS: 2
CV ZIO PAUSE COUNT: 0
CV ZIO PRESCRIPTION STATUS: NORMAL
CV ZIO SVT AVG BPM: 126 BPM
CV ZIO SVT BPM HIGH: 188 BPM
CV ZIO SVT BPM LOW: 69 BPM
CV ZIO SVT COUNT: 53
CV ZIO SVT F EPI AVG BPM: 162 BPM
CV ZIO SVT F EPI BEATS: 8 BEATS
CV ZIO SVT F EPI BPM HIGH: 188 BPM
CV ZIO SVT F EPI BPM LOW: 106 BPM
CV ZIO SVT F EPI DUR: 3.2 SEC
CV ZIO SVT F EPI END: NORMAL
CV ZIO SVT F EPI START: NORMAL
CV ZIO SVT L EPI AVG BPM: 99 BPM
CV ZIO SVT L EPI BEATS: 25 BEATS
CV ZIO SVT L EPI BPM HIGH: 118 BPM
CV ZIO SVT L EPI BPM LOW: 69 BPM
CV ZIO SVT L EPI DUR: 15.6 SEC
CV ZIO SVT L EPI END: NORMAL
CV ZIO SVT L EPI START: NORMAL
CV ZIO TOTAL  ENROLLMENT PERIOD: NORMAL
CV ZIO VT AVG BPM: 115 BPM
CV ZIO VT BPM HIGH: 138 BPM
CV ZIO VT BPM LOW: 69 BPM
CV ZIO VT COUNT: 3
CV ZIO VT F EPI AVG BPM: 132
CV ZIO VT F EPI BEATS: 5 BEATS
CV ZIO VT F EPI BPM HIGH: 138
CV ZIO VT F EPI BPM LOW: 120
CV ZIO VT F EPI DUR: 2 SEC
CV ZIO VT F EPI END: NORMAL
CV ZIO VT F EPI START: NORMAL
CV ZIO VT L EPI AVG BPM: 106
CV ZIO VT L EPI BEATS: 6 BEATS
CV ZIO VT L EPI BPM HIGH: 118 BPM
CV ZIO VT L EPI BPM LOW: 91 BPM
CV ZIO VT L EPI DUR: 3.8
CV ZIO VT L EPI END: NORMAL
CV ZIO VT L EPI START: NORMAL

## 2025-04-02 ENCOUNTER — TELEPHONE (OUTPATIENT)
Dept: CARDIOLOGY | Facility: CLINIC | Age: 75
End: 2025-04-02
Payer: MEDICARE

## 2025-04-02 NOTE — TELEPHONE ENCOUNTER
Caller: ELOISA MCCLELLAN    Relationship to patient: Emergency Contact    Best call back number: [phone]    Patient is needing: PT'S SPOUSE CALLED TO GET A RX REFILL ON LOSARTAN. MEDICATION NOT LISTED IN PT'S CHART. PT'S SPOUSE WOULD LIKE TO KNOW IF PT STILL NEEDS THIS MEDICATION. PLEASE CALL TO ADVISE.

## 2025-04-02 NOTE — TELEPHONE ENCOUNTER
Spoke with patients wife. He is to be on entresto and not losartan. She says he is taking entresto and I confirmed with her he is not to be on both.

## 2025-05-13 ENCOUNTER — OFFICE VISIT (OUTPATIENT)
Dept: CARDIOLOGY | Facility: CLINIC | Age: 75
End: 2025-05-13
Payer: MEDICARE

## 2025-05-13 VITALS
BODY MASS INDEX: 31.82 KG/M2 | SYSTOLIC BLOOD PRESSURE: 152 MMHG | WEIGHT: 191 LBS | DIASTOLIC BLOOD PRESSURE: 55 MMHG | HEART RATE: 53 BPM | RESPIRATION RATE: 18 BRPM | HEIGHT: 65 IN | OXYGEN SATURATION: 92 %

## 2025-05-13 DIAGNOSIS — E78.2 HYPERLIPIDEMIA, MIXED: ICD-10-CM

## 2025-05-13 DIAGNOSIS — I10 ESSENTIAL HYPERTENSION: ICD-10-CM

## 2025-05-13 DIAGNOSIS — I42.8 NON-ISCHEMIC CARDIOMYOPATHY: Primary | ICD-10-CM

## 2025-05-13 RX ORDER — ASPIRIN 81 MG/1
81 TABLET, CHEWABLE ORAL DAILY
COMMUNITY

## 2025-05-13 NOTE — PROGRESS NOTES
Cardiology Clinic Note  Nabil Samaniego MD, PhD    Subjective:     Encounter Date:05/13/2025      Patient ID: Luiz Connors is a 74 y.o. male.    Chief Complaint:  Chief Complaint   Patient presents with    Follow-up       HPI:        I had the pleasure to see this 73-year-old in clinic today in follow-up.  He has history of bypass surgery remotely 1998, PCI to the distal portion of the saphenous vein graft to a bifurcating obtuse marginal branch 2011 and repeat in 2022, hypertensive heart disease hyperlipidemia diabetes.  Secondary to recurrent chest pain after intervention 2022  patient underwent invasive ischemic evaluation demonstrating widely patent STOUT to LAD, patent native RCA small caliber mild luminal regularities diffusely but otherwise patent with PENG-3 flow, saphenous vein graft to obtuse marginal appears to have 50% aorto ostial takeoff narrowing but with adequate flow and no pressure diminution on catheter engagement, there is nonobstructive disease 30 to 40% diffusely throughout, the distal anastomotic portion of this graft has a stent with 40% in-stent restenosis however the distal edge has significant 70 to 80 perhaps even 90 % stenosis at the anastomosis into a bifurcating obtuse marginal branch of which the superior limb has 90% stenosis most severely in the view, the inferior limb 70% and are both small caliber vessels 2 to 2.5 mm in diameter.  There is also retrograde perfusion through the ascending/ retrograde limb of the obtuse marginal back to the circumflex which gives PLV branches along the inferolateral wall.  The left main is completely occluded providing no collateral flow and he is completely dependent on his vein graft anastomosis for the lateral wall.   Diagonal has retrograde perfusion for LIMA to LAD which was widely patent again.  He has preserved EF at 60 to 65%.  Given difficulty with IFR and FFR measurements given small distal caliber size multiple runoff distributions  ultimately he underwent nuclear perfusion study demonstrating reversibility of the lateral and inferolateral wall under stress conditions indicating significant contribution of distal anastomotic disease as well as superior and inferior limb proximal stenosis.  Stress September 2020 demonstrated lateral and inferolateral wall ischemia indicating vein graft significantly stenosed with physiologic limitation of myocardial supply.  Subsequently,  Patient was evaluated by [facility] and underwent cardiac catheterization on November 23, 2022.  He underwent IVUS guided  revascularization and PCI of the left circumflex with overlapping 3.0 x 15 mm, 3.5 x 33 mm, and 4.0 x 15 mm Xience stan point drug-eluting stents.        Patient has done well over the last couple of years however he presented last clinic back last time with dyspnea on exertion and chest heaviness and exertional angina.  Repeat 2D echo revealed EF reduced to around 35%, repeat left heart catheterization revealed no targets for revascularization with widely patent stent from left main into circumflex, grafts were patent as described.  No options for further revascularization.  He was placed on Entresto beta-blocker Imdur in addition to low-dose calcium channel blockers, aspirin high intensity statin and Plavix.  He is euvolemic and compensated today.  He is pending a repeat echo 90 days after reaching goal-directed medical therapy which will be in July of this year.  No unexplained syncope      Exam  Vitals reviewed below  Regular rate and rhythm with no rubs gallops heave or lift, 1 out of 6 systolic ejection murmur left sternal border stable  Left greater than right carotid bruit present on exam  No JVD HJR,   No edema  No clubbing cyanosis  Intact grossly  Clear to auscultation  Soft nontender nondistended        Assessment and plan today  Independently manage medical conditions     Multivessel CAD  Status post left main and to circumflex   "revascularization [facility]  Essential hypertension  PVD with carotid bruits  Hyperlipidemia  Statin intolerance  History of chronic stable angina  Type 2 diabetes  Combined ischemic and nonischemic cardiomyopathy, 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 2022 continue aspirin Plavix     Follow-up echo has been ordered, EF 30 to 35%, down from  45 to 50% previously, possibly hypertension related  Outside hospital echo down to 25% with prior hospitalization as well previously  Optimized on goal-directed medical therapy as above  Reecho in 90 days after prior      Left heart catheterization revealed no targets revascularization with patent grafts as well as stent performed [facility] for left main into circumflex.      Carotid Dopplers 2024 evaluate left carotid bruit, less than 50% stenosis, status post right carotid endarterectomy with no recurrent disease     Diabetes control per primary     Tolerating atorvastatin, 80 daily goal LDL less than 55  Off gemfibrozil, does not want PCSK9's    Entresto Aldactone beta-blocker on board        Secondary prevention goals  Diet and exercise per AHA guidelines  Goal-directed medical therapy on with antiplatelet therapy, lipid-lowering therapy per guidelines, afterload reduction, diabetes treatment        Further recommendation follow findings and clinical course    Back to clinic 4 months     Nabil Samaniego MD, PhD    Objective:         /55 (BP Location: Left arm, Patient Position: Sitting)   Pulse 53   Resp 18   Ht 165.1 cm (65\")   Wt 86.6 kg (191 lb)   SpO2 92%   BMI 31.78 kg/m²           The pleasure to be involved in this patient's " cardiovascular care.  Please call with any questions or concerns  Nabil Samaniego MD, PhD    Most recent EKG as reviewed and interpreted by me:  Procedures     Most recent echo as reviewed and interpreted by me:  Results for orders placed during the hospital encounter of 02/13/25    Adult Transthoracic Echo Complete W/ Cont if Necessary Per Protocol (With Agitated Saline)    Interpretation Summary    Left ventricular systolic function is moderately decreased. Calculated left ventricular EF = 29% Left ventricular ejection fraction appears to be 26 - 30%.    The left ventricular cavity is moderately dilated.    Left ventricular diastolic function is consistent with (grade III w/high LAP) reversible restrictive pattern.    Mildly reduced right ventricular systolic function noted.    The left atrial cavity is dilated.    Saline test results are negative for right to left atrial level shunt.    Moderate mitral valve regurgitation is present.    Estimated right ventricular systolic pressure from tricuspid regurgitation is normal (<35 mmHg).      Most recent stress test as reviewed and interpreted by me:  Results for orders placed during the hospital encounter of 07/12/24    Stress Test With Myocardial Perfusion One Day    Interpretation Summary    Left ventricular ejection fraction is moderately reduced (Calculated EF = 36%).    Abnormal LV wall motion consistent with severe hypokinesis and global hypokinesis.    Myocardial perfusion imaging indicates a small-to-moderate-sized infarct located in the lateral wall with no significant ischemia noted.    There is no prior study available for comparison. There is fixed mid to basal lateral wall defect without significant reversibility, cannot r/o small prior lateral infarct EF globally reduced 36% with enlarged ventricle.    Findings consistent with a normal ECG stress test.    Possible old moderate middle lateral wall infarct, no significant reversibility identified in this  "region  Severely hypertensive response greater than 220 systolic  Globally reduced EF 36%  Global hypokinesis  Stress ECG with no ischemic ST-T wave changes      Most recent cardiac catheterization as reviewed interpreted by me:  Results for orders placed during the hospital encounter of 02/27/25    Cardiac Catheterization/Vascular Study    Conclusion  Primary operative Nabil Samaniego MD, PhD  [facility] cardiology  Date of service 2-    Procedure  1.  Left heart catheterization, imaging and angiography of  native and bypass grafts, LV gram    Indication  Cardiomyopathy with worsening EF, evaluation for coronary anatomy    After 4 visit patient brought to the Cath Lab sterilely prepped and draped in usual fashion with exposure of the right groin for right common femoral arterial access via micropuncture and modified Seldinger technique with placement of a 6 [place] sheath.  035 guidewire was advanced aortic valve followed by diagnostic JL 4 and JR4 catheters for selective left and right coronary angiography respectively, JR4 was used across aortic valve followed by hand-injection for LV gram EDP assessment and pullback assessment the transaortic gradient.  It was also used for bypass graft angiography for SVG to obtuse marginal branch and LIMA to LAD including RCA.  There was no obstructive coronary disease for any improvement, all catheters\" removed, he left Cath Lab chest pain-free    Complications none  Blood loss less than 10 cc  Contrast 65 cc  Sedation time 30 minutes    Findings  1.  Opening aortic pressure 160/70, closing pressure 140/65  2.  LVEDP 12-14, LV systolic function severely reduced 25% globally, normal transaortic valve gradient on pullback    Angiography  1.  Left main has a stent extending into the circumflex mid region, no ISR widely patent, at the distal edge of the circumflex stent there is 30% edge stenosis or stepdown, no left main disease, LAD is flush occluded  2.  LAD flush " occluded at the left main, widely patent LIMA to LAD with retrograde perfusion of proximal diagonal branch, no evidence of anastomotic narrowing, antegrade flow to the apex as well as retrograde filling the diagonal branch with no obstructive disease and widely patent flow  3.  Circumflex nondominant with 3 obtuse marginal branches, there is a stent from the left main into the mid circumflex, it continues and trifurcates along the lateral wall, the superior branch of the obtuse marginal branch has an anastomosis of the vein graft which has anastomotic stenosis of 95% but otherwise has antegrade flow from the left main circumflex distribution into both superior and inferior limbs of the obtuse marginal branch with no significant stenosis, continuation circumflex and terminal obtuse marginal branch no disease  4.  RCA small nondominant diffuse luminal regularities 30 to 40% maximally with small vessels tapering equality distally  5.  SVG to obtuse marginal branch widely patent throughout its course other than anastomosis which has 95% stenosis leading into small caliber vessel but does have antegrade flow competitively  6 STOUT to LAD widely patent throughout its course, no anastomotic disease    Conclusions recommendations  1 advancing nonischemic cardiomyopathy, successful revascularization via  revascularization for left main into circumflex given severe vein graft disease, widely patent LIMA to LAD, native RCA is patent, PENG-3 flow throughout the entire circumflex distribution    Advancing cardiomyopathy not secondary to flow-limiting stenosis  Optimize goal-directed medical therapy  If does not improve with 35% with goal-directed medical therapy would refer for ICD    Nabil Samaniego MD, PhD    The following portions of the patient's history were reviewed and updated as appropriate: allergies, current medications, past family history, past medical history, past social history, past surgical history, and problem  list.      ROS:  14 point review of systems negative except as mentioned above    Current Outpatient Medications:     acetaminophen (TYLENOL) 325 MG tablet, Take 2 tablets by mouth Every 12 (Twelve) Hours As Needed for Mild Pain., Disp: , Rfl:     amLODIPine (NORVASC) 2.5 MG tablet, Take 1 tablet by mouth Daily., Disp: 30 tablet, Rfl: 11    aspirin 81 MG chewable tablet, Chew 1 tablet Daily., Disp: , Rfl:     atorvastatin (LIPITOR) 80 MG tablet, Take 1 tablet by mouth Every Night., Disp: 90 tablet, Rfl: 3    clopidogrel (PLAVIX) 75 MG tablet, Take 1 tablet by mouth Daily., Disp: 30 tablet, Rfl: 0    glipizide (GLUCOTROL) 5 MG tablet, Take 0.5 tablets by mouth Daily., Disp: , Rfl:     isosorbide mononitrate (IMDUR) 30 MG 24 hr tablet, Take 1 tablet by mouth Daily., Disp: 90 tablet, Rfl: 3    metFORMIN ER (GLUCOPHAGE-XR) 500 MG 24 hr tablet, 2 tablets Daily With Breakfast., Disp: , Rfl:     metoprolol succinate XL (TOPROL-XL) 25 MG 24 hr tablet, Take 1 tablet by mouth Daily., Disp: 30 tablet, Rfl: 11    nitroglycerin (NITROSTAT) 0.4 MG SL tablet, Place 1 tablet under the tongue Every 5 (Five) Minutes As Needed for Chest Pain. Prn chest pain, Disp: 100 tablet, Rfl: 3    sacubitril-valsartan (Entresto) 49-51 MG tablet, Take 1 tablet by mouth 2 (Two) Times a Day., Disp: 60 tablet, Rfl: 11    triamcinolone (KENALOG) 0.1 % cream, Apply  topically to the appropriate area as directed., Disp: , Rfl:     Problem List:  Patient Active Problem List   Diagnosis    Influenza A    Acute ischemic VBA thalamic stroke, left    Acute ischemic stroke    Bilateral carotid artery stenosis    Bradycardia    Coronary artery disease    Status post coronary artery bypass graft    Essential hypertension    Hyperlipidemia, mixed    Type 2 diabetes mellitus    Body fluid retention    Angina pectoris    Acute poliomyelitis    H/O: pneumonia    History of CEA (carotid endarterectomy)    S/P PTCA (percutaneous transluminal coronary angioplasty)     Myocardiopathy    Decreased cardiac ejection fraction     Past Medical History:  Past Medical History:   Diagnosis Date    Coronary artery disease     CVA (cerebral vascular accident) 02/16/2025    Diabetes mellitus     Hyperlipidemia     Hypertension      Past Surgical History:  Past Surgical History:   Procedure Laterality Date    CARDIAC CATHETERIZATION      2015    CARDIAC CATHETERIZATION N/A 08/10/2022    Procedure: Left Heart Cath;  Surgeon: Nabil Samaniego MD;  Location: [facility] CATH INVASIVE LOCATION;  Service: Cardiology;  Laterality: N/A;    CARDIAC CATHETERIZATION N/A 2/27/2025    Procedure: Left Heart Cath;  Surgeon: Nabil Samaniego MD;  Location:  [facility] CATH INVASIVE LOCATION;  Service: Cardiology;  Laterality: N/A;    CAROTID ENDARTERECTOMY      CAROTID STENT  2015    PCI  to SVG supplying the LC/OM    CORONARY ARTERY BYPASS GRAFT      1998    CORONARY STENT PLACEMENT       Social History:  Social History     Socioeconomic History    Marital status:    Tobacco Use    Smoking status: Former     Types: Cigarettes     Start date: 1975    Smokeless tobacco: Never   Vaping Use    Vaping status: Never Used   Substance and Sexual Activity    Alcohol use: Never    Drug use: Never    Sexual activity: Not Currently     Partners: Female     Birth control/protection: Post-menopausal     Allergies:  Allergies   Allergen Reactions    Statins Cough     Severe muscle aches/spasms     Immunizations:  Immunization History   Administered Date(s) Administered    Pneumococcal Polysaccharide (PPSV23) 02/24/2017, 02/24/2017    TD Preservative Free (Tenivac) 05/07/2015, 05/07/2015            In-Office Procedure(s):  No orders to display        ASCVD RIsk Score::  The ASCVD Risk score (Tiffany LANCE, et al., 2019) failed to calculate for the following reasons:    Risk score cannot be calculated because patient has a medical history suggesting prior/existing ASCVD    Imaging:    Results for orders placed  during the hospital encounter of 02/13/25    XR Spine Cervical 2 or 3 View    Narrative  XR SPINE CERVICAL 2 OR 3 VW    Date of Exam: 2/13/2025 8:10 AM EST    Indication: eval hardware    Comparison: None available.    Findings:  No acute fracture is identified. The alignment is anatomic. There are changes from C4 corpectomy with cage body graft with anterior cervical discectomy and fusion at C3-C5. One or both of the C3 screws are fractured. There are moderate discogenic changes  at C5-6 and C6-7. There is moderate scattered facet arthropathy and uncovertebral hypertrophy. The prevertebral soft tissues are unremarkable.    Impression  Impression:  1.Changes from C4 corpectomy with ACDF at C3-C5. One or both of the C3 screws are fractured.  2.Multilevel degenerative changes as described above.        Electronically Signed: Demario Bettencourt MD  2/13/2025 8:51 AM EST  Workstation ID: [id number]       Results for orders placed during the hospital encounter of 02/13/25    US Renal Bilateral    Narrative  US RENAL BILATERAL    Date of Exam: 2/13/2025 6:41 PM EST    Indication: RAÚL.    Comparison: No comparisons available.    Technique: Grayscale and color Doppler ultrasound evaluation of the kidneys and urinary bladder was performed.      Findings:  Right Kidney: Right kidney measures 11.1 cm in long axis. Renal cortical thickness and echogenicity are within normal limits.No suspicious appearing lesions.No hydronephrosis.No sonographically evident nephrolithiasis.    Left Kidney:  Left kidney measures 12.2 cm in long axis. Renal cortical thickness and echogenicity are within normal limits.No suspicious appearing lesions.. There is a simple appearing left renal cyst measuring up to 1.5 cm. No hydronephrosis.No  sonographically evident nephrolithiasis.    Bladder: The bladder appears unremarkable.    Impression  Impression:  No hydronephrosis or sonographically evident nephrolithiasis.        Electronically Signed: Nestor GOMEZ  [place]  2/13/2025 7:02 PM EST  Workstation ID: [id number]      Results for orders placed during the hospital encounter of 02/13/25    CT Head Without Contrast    Narrative  CT HEAD WO CONTRAST    Date of Exam: 2/13/2025 7:12 AM EST    Indication: ams.    Comparison: None available.    Technique: Axial CT images were obtained of the head without contrast administration.  Coronal reconstructions were performed.  Automated exposure control and iterative reconstruction methods were used.      Findings:  There is no intracranial hemorrhage. No mass effect or midline shift. There is an area of encephalomalacia involving the right posterior temporal lobe related to sequela of remote infarct. Mild white matter findings suggesting chronic microvascular  disease. No intraventricular hemorrhage. The posterior fossa is without acute abnormality. Globes are intact and symmetric. Slight rightward deviation of the nasal septum. The mastoid air cells are well-aerated. There is no sinus fluid level. No acute  calvarial fracture. Cervical ACDF hardware noted at the upper cervical spine on the  radiograph with corpectomy cage. C3 screw appears fractured.    Impression  Impression:  1. No acute intracranial abnormality.  2. Remote right posterior temporal infarct with encephalomalacia.  3. Cervical ACDF hardware partially imaged on  radiograph. C3 screw appears fractured, cervical radiographs may be helpful to better assess.          Electronically Signed: Demetrio Hall MD  2/13/2025 7:31 AM EST  Workstation ID: [id number]      Lab Review:   Admission on 02/27/2025, Discharged on 02/27/2025   Component Date Value    Glucose 02/27/2025 167 (H)     BUN 02/27/2025 21     Creatinine 02/27/2025 1.00     Sodium 02/27/2025 143     Potassium 02/27/2025 4.2     Chloride 02/27/2025 105     CO2 02/27/2025 26.4     Calcium 02/27/2025 10.3     BUN/Creatinine Ratio 02/27/2025 21.0     Anion Gap 02/27/2025 11.6     eGFR 02/27/2025 78.5      Protime 02/27/2025 13.9     INR 02/27/2025 1.08     WBC 02/27/2025 10.10     RBC 02/27/2025 4.88     Hemoglobin 02/27/2025 13.9     Hematocrit 02/27/2025 44.1     MCV 02/27/2025 90.4     MCH 02/27/2025 28.5     MCHC 02/27/2025 31.5     RDW 02/27/2025 13.3     RDW-SD 02/27/2025 43.5     MPV 02/27/2025 10.0     Platelets 02/27/2025 438     Neutrophil % 02/27/2025 52.4     Lymphocyte % 02/27/2025 30.8     Monocyte % 02/27/2025 10.0     Eosinophil % 02/27/2025 5.0     Basophil % 02/27/2025 1.3     Immature Grans % 02/27/2025 0.5     Neutrophils, Absolute 02/27/2025 5.30     Lymphocytes, Absolute 02/27/2025 3.11 (H)     Monocytes, Absolute 02/27/2025 1.01 (H)     Eosinophils, Absolute 02/27/2025 0.50 (H)     Basophils, Absolute 02/27/2025 0.13     Immature Grans, Absolute 02/27/2025 0.05     nRBC 02/27/2025 0.0    Admission on 02/24/2025, Discharged on 02/24/2025   Component Date Value    WBC 02/24/2025 12.13 (H)     RBC 02/24/2025 4.79     Hemoglobin 02/24/2025 13.8     Hematocrit 02/24/2025 43.0     MCV 02/24/2025 89.8     MCH 02/24/2025 28.8     MCHC 02/24/2025 32.1     RDW 02/24/2025 13.2     RDW-SD 02/24/2025 44.0     MPV 02/24/2025 10.1     Platelets 02/24/2025 429     Neutrophil % 02/24/2025 53.4     Lymphocyte % 02/24/2025 29.1     Monocyte % 02/24/2025 9.5     Eosinophil % 02/24/2025 6.5 (H)     Basophil % 02/24/2025 0.8     Immature Grans % 02/24/2025 0.7 (H)     Neutrophils, Absolute 02/24/2025 6.47     Lymphocytes, Absolute 02/24/2025 3.53 (H)     Monocytes, Absolute 02/24/2025 1.15 (H)     Eosinophils, Absolute 02/24/2025 0.79 (H)     Basophils, Absolute 02/24/2025 0.10     Immature Grans, Absolute 02/24/2025 0.09 (H)     nRBC 02/24/2025 0.0     Extra Tube 02/24/2025 Hold for add-ons.     Extra Tube 02/24/2025 Hold for add-ons.     PTT 02/24/2025 26.5     Protime 02/24/2025 14.0     INR 02/24/2025 1.08     Glucose 02/24/2025 119 (H)     BUN 02/24/2025 14     Creatinine 02/24/2025 0.74 (L)     Sodium  02/24/2025 140     Potassium 02/24/2025 4.3     Chloride 02/24/2025 101     CO2 02/24/2025 28.0     Calcium 02/24/2025 10.3     Total Protein 02/24/2025 8.3     Albumin 02/24/2025 4.1     ALT (SGPT) 02/24/2025 16     AST (SGOT) 02/24/2025 29     Alkaline Phosphatase 02/24/2025 59     Total Bilirubin 02/24/2025 0.3     Globulin 02/24/2025 4.2     A/G Ratio 02/24/2025 1.0     BUN/Creatinine Ratio 02/24/2025 18.9     Anion Gap 02/24/2025 11.0     eGFR 02/24/2025 94.5    No results displayed because visit has over 200 results.        Recent labs reviewed and interpreted for clinical significance and application            Level of Care:           Nabil Samaniego MD  05/13/25  .

## 2025-07-01 ENCOUNTER — HOSPITAL ENCOUNTER (OUTPATIENT)
Dept: CARDIOLOGY | Facility: HOSPITAL | Age: 75
Discharge: HOME | End: 2025-07-01
Admitting: INTERNAL MEDICINE
Payer: MEDICARE

## 2025-07-01 VITALS
HEIGHT: 65 IN | HEART RATE: 58 BPM | WEIGHT: 191 LBS | BODY MASS INDEX: 31.82 KG/M2 | SYSTOLIC BLOOD PRESSURE: 168 MMHG | DIASTOLIC BLOOD PRESSURE: 61 MMHG

## 2025-07-01 DIAGNOSIS — I42.8 NON-ISCHEMIC CARDIOMYOPATHY: ICD-10-CM

## 2025-07-01 DIAGNOSIS — I10 ESSENTIAL HYPERTENSION: ICD-10-CM

## 2025-07-01 DIAGNOSIS — E78.2 HYPERLIPIDEMIA, MIXED: ICD-10-CM

## 2025-07-01 LAB
AORTIC DIMENSIONLESS INDEX: 0.5 (DI)
AV MEAN PRESS GRAD SYS DOP V1V2: 7.2 MMHG
AV VMAX SYS DOP: 201.7 CM/SEC
BH CV ECHO MEAS - ACS: 1.2 CM
BH CV ECHO MEAS - AI P1/2T: 636.2 MSEC
BH CV ECHO MEAS - AO MAX PG: 16.3 MMHG
BH CV ECHO MEAS - AO ROOT DIAM: 2.8 CM
BH CV ECHO MEAS - AO V2 VTI: 44.8 CM
BH CV ECHO MEAS - AVA(I,D): 1.64 CM2
BH CV ECHO MEAS - EDV(CUBED): 217.5 ML
BH CV ECHO MEAS - EDV(MOD-SP4): 154.6 ML
BH CV ECHO MEAS - EF(MOD-SP4): 35 %
BH CV ECHO MEAS - ESV(CUBED): 128.3 ML
BH CV ECHO MEAS - ESV(MOD-SP4): 100.7 ML
BH CV ECHO MEAS - FS: 16.1 %
BH CV ECHO MEAS - IVS/LVPW: 0.94 CM
BH CV ECHO MEAS - IVSD: 0.96 CM
BH CV ECHO MEAS - LA DIMENSION: 4.5 CM
BH CV ECHO MEAS - LAT PEAK E' VEL: 6.7 CM/SEC
BH CV ECHO MEAS - LV DIASTOLIC VOL/BSA (35-75): 79.7 CM2
BH CV ECHO MEAS - LV MASS(C)D: 243.3 GRAMS
BH CV ECHO MEAS - LV MAX PG: 3.3 MMHG
BH CV ECHO MEAS - LV MEAN PG: 1.74 MMHG
BH CV ECHO MEAS - LV SYSTOLIC VOL/BSA (12-30): 51.9 CM2
BH CV ECHO MEAS - LV V1 MAX: 90.5 CM/SEC
BH CV ECHO MEAS - LV V1 VTI: 22.4 CM
BH CV ECHO MEAS - LVIDD: 6 CM
BH CV ECHO MEAS - LVIDS: 5 CM
BH CV ECHO MEAS - LVOT AREA: 3.3 CM2
BH CV ECHO MEAS - LVOT DIAM: 2.05 CM
BH CV ECHO MEAS - LVPWD: 1.02 CM
BH CV ECHO MEAS - MED PEAK E' VEL: 3.7 CM/SEC
BH CV ECHO MEAS - MR MAX PG: 116.2 MMHG
BH CV ECHO MEAS - MR MAX VEL: 538.7 CM/SEC
BH CV ECHO MEAS - MV A MAX VEL: 73.6 CM/SEC
BH CV ECHO MEAS - MV DEC SLOPE: 266.1 CM/SEC2
BH CV ECHO MEAS - MV DEC TIME: 0.31 SEC
BH CV ECHO MEAS - MV E MAX VEL: 82.9 CM/SEC
BH CV ECHO MEAS - MV E/A: 1.13
BH CV ECHO MEAS - MV MAX PG: 3.8 MMHG
BH CV ECHO MEAS - MV MEAN PG: 1.38 MMHG
BH CV ECHO MEAS - MV V2 VTI: 41.4 CM
BH CV ECHO MEAS - MVA(VTI): 1.77 CM2
BH CV ECHO MEAS - PA ACC TIME: 0.11 SEC
BH CV ECHO MEAS - PA V2 MAX: 111.7 CM/SEC
BH CV ECHO MEAS - PI END-D VEL: 111.8 CM/SEC
BH CV ECHO MEAS - PULM A REVS DUR: 0.09 SEC
BH CV ECHO MEAS - PULM A REVS VEL: 21 CM/SEC
BH CV ECHO MEAS - PULM DIAS VEL: 69.5 CM/SEC
BH CV ECHO MEAS - PULM S/D: 0.84
BH CV ECHO MEAS - PULM SYS VEL: 58.7 CM/SEC
BH CV ECHO MEAS - RAP SYSTOLE: 3 MMHG
BH CV ECHO MEAS - RVSP: 35.7 MMHG
BH CV ECHO MEAS - SV(LVOT): 73.5 ML
BH CV ECHO MEAS - SV(MOD-SP4): 53.8 ML
BH CV ECHO MEAS - SVI(LVOT): 37.9 ML/M2
BH CV ECHO MEAS - SVI(MOD-SP4): 27.8 ML/M2
BH CV ECHO MEAS - TAPSE (>1.6): 1.46 CM
BH CV ECHO MEAS - TR MAX PG: 32.7 MMHG
BH CV ECHO MEAS - TR MAX VEL: 283.8 CM/SEC
BH CV ECHO MEASUREMENTS AVERAGE E/E' RATIO: 15.94
BH CV XLRA - RV BASE: 4.2 CM
BH CV XLRA - RV MID: 2.4 CM
LV EF BIPLANE MOD: 35 %

## 2025-07-01 PROCEDURE — 93306 TTE W/DOPPLER COMPLETE: CPT

## 2025-07-02 ENCOUNTER — TELEPHONE (OUTPATIENT)
Dept: CARDIOLOGY | Facility: CLINIC | Age: 75
End: 2025-07-02

## 2025-07-02 NOTE — TELEPHONE ENCOUNTER
Caller: ELOISA MCCLELLAN    Relationship to patient: Emergency Contact    Best call back number:  [phone]    Patient is needing: PATIENT IS HAVING PCP SEND OVER LAB RESULTS

## 2025-07-15 ENCOUNTER — OFFICE VISIT (OUTPATIENT)
Dept: CARDIOLOGY | Facility: CLINIC | Age: 75
End: 2025-07-15
Payer: MEDICARE

## 2025-07-15 VITALS
OXYGEN SATURATION: 95 % | DIASTOLIC BLOOD PRESSURE: 77 MMHG | HEIGHT: 65 IN | RESPIRATION RATE: 18 BRPM | SYSTOLIC BLOOD PRESSURE: 156 MMHG | BODY MASS INDEX: 32.65 KG/M2 | HEART RATE: 53 BPM | WEIGHT: 196 LBS

## 2025-07-15 DIAGNOSIS — I42.8 NON-ISCHEMIC CARDIOMYOPATHY: Primary | ICD-10-CM

## 2025-07-15 RX ORDER — EZETIMIBE 10 MG/1
10 TABLET ORAL DAILY
Qty: 90 TABLET | Refills: 3 | Status: SHIPPED | OUTPATIENT
Start: 2025-07-15

## 2025-07-15 NOTE — PROGRESS NOTES
Cardiology Clinic Note  Nabil Samaniego MD, PhD    Subjective:     Encounter Date:07/15/2025      Patient ID: Luiz Connors is a 74 y.o. male.    Chief Complaint:  Chief Complaint   Patient presents with    Follow-up       HPI:        I had the pleasure to see this 74-year-old in clinic today in follow-up.  He has history of bypass surgery remotely 1998, PCI to the distal portion of the saphenous vein graft to a bifurcating obtuse marginal branch 2011 and repeat in 2022, hypertensive heart disease hyperlipidemia diabetes.  Secondary to recurrent chest pain after intervention 2022  patient underwent invasive ischemic evaluation demonstrating widely patent STOUT to LAD, patent native RCA small caliber mild luminal regularities diffusely but otherwise patent with PENG-3 flow, saphenous vein graft to obtuse marginal appears to have 50% aorto ostial takeoff narrowing but with adequate flow and no pressure diminution on catheter engagement, there is nonobstructive disease 30 to 40% diffusely throughout, the distal anastomotic portion of this graft has a stent with 40% in-stent restenosis however the distal edge has significant 70 to 80 perhaps even 90 % stenosis at the anastomosis into a bifurcating obtuse marginal branch of which the superior limb has 90% stenosis most severely in the view, the inferior limb 70% and are both small caliber vessels 2 to 2.5 mm in diameter.  There is also retrograde perfusion through the ascending/ retrograde limb of the obtuse marginal back to the circumflex which gives PLV branches along the inferolateral wall.  The left main was completely occluded providing no collateral flow and he is completely dependent on his vein graft anastomosis for the lateral wall.   Diagonal has retrograde perfusion for LIMA to LAD which was widely patent again.  He has preserved EF at 60 to 65%.  Given difficulty with IFR and FFR measurements given small distal caliber size multiple runoff distributions  ultimately he underwent nuclear perfusion study demonstrating reversibility of the lateral and inferolateral wall under stress conditions indicating significant contribution of distal anastomotic disease as well as superior and inferior limb proximal stenosis.  Stress September 2020 demonstrated lateral and inferolateral wall ischemia indicating vein graft significantly stenosed with physiologic limitation of myocardial supply.  Subsequently,  Patient was evaluated by [facility] and underwent cardiac catheterization on November 23, 2022.  He underwent IVUS guided  revascularization and PCI of the left circumflex with overlapping 3.0 x 15 mm, 3.5 x 33 mm, and 4.0 x 15 mm Xience stan point drug-eluting stents.        Patient back for follow-up today, he presented last clinic back last time with dyspnea on exertion and chest heaviness and exertional angina.  Repeat 2D echo revealed EF reduced to around 35%, repeat left heart catheterization revealed no targets for revascularization with widely patent stent from left main into circumflex, grafts were patent as described.  No options for further revascularization.  He was placed on Entresto beta-blocker Imdur in addition to low-dose calcium channel blockers, aspirin high intensity statin and Plavix.  He displays no new anginal chest pain, unchanged overall symptoms, we discussed adding Leqvio and Zetia today, referral to EP for an ICD given minimal improvement in his overall EF which remains around 30 to 35%.    Repeat echo July 2025 with EF of 34%, mild to moderately dilated LV,  diameter 6.3 cm with grade 1-2 diastolic dysfunction, moderate reduced RV systolic function with mild aortic and mitral valve regurgitation, mildly elevated PA systolic pressure.       Exam  Vitals reviewed below  Regular rate and rhythm with no rubs gallops heave or lift, 1 out of 6 systolic ejection murmur left sternal border stable  Left greater than right carotid bruit present on  exam  No JVD HJR,   No edema  No clubbing cyanosis  Intact grossly  Clear to auscultation  Soft nontender nondistended        Assessment and plan today  Independently manage medical conditions     Multivessel CAD  Status post left main and to circumflex  revascularization [facility]  Essential hypertension  PVD with carotid bruits  Hyperlipidemia  Statin intolerance  History of chronic stable angina  Type 2 diabetes  Combined ischemic and nonischemic cardiomyopathy, 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%     stress July 2024  with fixed mid to basal lateral wall defect without reversibility cannot rule out lateral wall infarct, global hypokinesis, severely hypertensive response up to 220 systolic, stress ECG with no ischemic ST-T wave findings at submaximal stress  Status post  revascularization left main and circumflex, [facility] 2022 continue aspirin Plavix     Follow-up echo has been ordered, EF 30 to 35%, down from  45 to 50% previously, possibly hypertension related  Repeat echo in July 2025 not much improvement with a EF 34 to 35% despite maximal tolerated goal-directed medical therapy    Optimized on goal-directed medical therapy as above    Would recommend ICD placement, refer to EP     Carotid Dopplers 2024 evaluate left carotid bruit, less than 50% stenosis, status post right carotid endarterectomy with no recurrent disease     Diabetes control per primary     Tolerating atorvastatin, 80 daily goal LDL less than 55  Off gemfibrozil, does not want PCSK9's     Entresto Aldactone beta-blocker on board  ED 7 months ago.  Patient tripped over     Secondary prevention goals  Diet and exercise per AHA guidelines  Goal-directed medical therapy on with antiplatelet therapy, lipid-lowering therapy per guidelines, afterload reduction, diabetes treatment        Further recommendation follow findings and clinical course     Back to clinic 4 months     Nabil Samaniego MD, PhD    Objective:         /77 (BP Location: Left arm,  "Patient Position: Sitting)   Pulse 53   Resp 18   Ht 165.1 cm (65\")   Wt 88.9 kg (196 lb)   SpO2 95%   BMI 32.62 kg/m²     Physical Exam    Assessment:         There are no diagnoses linked to this encounter.       Plan:              The pleasure to be involved in this patient's cardiovascular care.  Please call with any questions or concerns  Nabil Samaniego MD, PhD    Most recent EKG as reviewed and interpreted by me:  Procedures     Most recent echo as reviewed and interpreted by me:  Results for orders placed during the hospital encounter of 07/01/25    Adult Transthoracic Echo Complete W/ Color, Spectral and Contrast if Necessary Per Protocol 07/01/2025 12:49 PM    Interpretation Summary    Left ventricular systolic function is moderately decreased. Calculated left ventricular EF = 35%    The left ventricular cavity is mild to moderately dilated. LVEDD 6.3 cm.    Left ventricular diastolic function is consistent with (grade I) impaired relaxation.    Moderately reduced right ventricular systolic function noted.    There is mild aortic valve regurgitation.    There is mild mitral valve regurgitation.    The left atrial cavity is mildly dilated.    Estimated right ventricular systolic pressure from tricuspid regurgitation is mildly elevated (35-45 mmHg).      Most recent stress test as reviewed and interpreted by me:  Results for orders placed during the hospital encounter of 07/12/24    Stress Test With Myocardial Perfusion One Day 07/12/2024 10:45 AM    Interpretation Summary    Left ventricular ejection fraction is moderately reduced (Calculated EF = 36%).    Abnormal LV wall motion consistent with severe hypokinesis and global hypokinesis.    Myocardial perfusion imaging indicates a small-to-moderate-sized infarct located in the lateral wall with no significant ischemia noted.    There is no prior study available for comparison. There is fixed mid to basal lateral wall defect without significant " "reversibility, cannot r/o small prior lateral infarct EF globally reduced 36% with enlarged ventricle.    Findings consistent with a normal ECG stress test.    Possible old moderate middle lateral wall infarct, no significant reversibility identified in this region  Severely hypertensive response greater than 220 systolic  Globally reduced EF 36%  Global hypokinesis  Stress ECG with no ischemic ST-T wave changes      Most recent cardiac catheterization as reviewed interpreted by me:  Results for orders placed during the hospital encounter of 02/27/25    Cardiac Catheterization/Vascular Study    Conclusion  Primary operative Nabil Samaniego MD, PhD  [facility] cardiology  Date of service 2-    Procedure  1.  Left heart catheterization, imaging and angiography of  native and bypass grafts, LV gram    Indication  Cardiomyopathy with worsening EF, evaluation for coronary anatomy    After 4 visit patient brought to the Cath Lab sterilely prepped and draped in usual fashion with exposure of the right groin for right common femoral arterial access via micropuncture and modified Seldinger technique with placement of a 6 Armenian sheath.  035 guidewire was advanced aortic valve followed by diagnostic JL 4 and JR4 catheters for selective left and right coronary angiography respectively, JR4 was used across aortic valve followed by hand-injection for LV gram EDP assessment and pullback assessment the transaortic gradient.  It was also used for bypass graft angiography for SVG to obtuse marginal branch and LIMA to LAD including RCA.  There was no obstructive coronary disease for any improvement, all catheters\" removed, he left Cath Lab chest pain-free    Complications none  Blood loss less than 10 cc  Contrast 65 cc  Sedation time 30 minutes    Findings  1.  Opening aortic pressure 160/70, closing pressure 140/65  2.  LVEDP 12-14, LV systolic function severely reduced 25% globally, normal transaortic valve gradient on " pullback    Angiography  1.  Left main has a stent extending into the circumflex mid region, no ISR widely patent, at the distal edge of the circumflex stent there is 30% edge stenosis or stepdown, no left main disease, LAD is flush occluded  2.  LAD flush occluded at the left main, widely patent LIMA to LAD with retrograde perfusion of proximal diagonal branch, no evidence of anastomotic narrowing, antegrade flow to the apex as well as retrograde filling the diagonal branch with no obstructive disease and widely patent flow  3.  Circumflex nondominant with 3 obtuse marginal branches, there is a stent from the left main into the mid circumflex, it continues and trifurcates along the lateral wall, the superior branch of the obtuse marginal branch has an anastomosis of the vein graft which has anastomotic stenosis of 95% but otherwise has antegrade flow from the left main circumflex distribution into both superior and inferior limbs of the obtuse marginal branch with no significant stenosis, continuation circumflex and terminal obtuse marginal branch no disease  4.  RCA small nondominant diffuse luminal regularities 30 to 40% maximally with small vessels tapering equality distally  5.  SVG to obtuse marginal branch widely patent throughout its course other than anastomosis which has 95% stenosis leading into small caliber vessel but does have antegrade flow competitively  6 STOUT to LAD widely patent throughout its course, no anastomotic disease    Conclusions recommendations  1 advancing nonischemic cardiomyopathy, successful revascularization via  revascularization for left main into circumflex given severe vein graft disease, widely patent LIMA to LAD, native RCA is patent, PENG-3 flow throughout the entire circumflex distribution    Advancing cardiomyopathy not secondary to flow-limiting stenosis  Optimize goal-directed medical therapy  If does not improve with 35% with goal-directed medical therapy would refer  for ICD    Nabil Samaniego MD, PhD    The following portions of the patient's history were reviewed and updated as appropriate: allergies, current medications, past family history, past medical history, past social history, past surgical history, and problem list.      ROS:  14 point review of systems negative except as mentioned above    Current Outpatient Medications:     acetaminophen (TYLENOL) 325 MG tablet, Take 2 tablets by mouth Every 12 (Twelve) Hours As Needed for Mild Pain., Disp: , Rfl:     amLODIPine (NORVASC) 2.5 MG tablet, Take 1 tablet by mouth Daily., Disp: 30 tablet, Rfl: 11    aspirin 81 MG chewable tablet, Chew 1 tablet Daily., Disp: , Rfl:     clopidogrel (PLAVIX) 75 MG tablet, Take 1 tablet by mouth Daily., Disp: 30 tablet, Rfl: 0    glipizide (GLUCOTROL) 5 MG tablet, Take 0.5 tablets by mouth Daily., Disp: , Rfl:     isosorbide mononitrate (IMDUR) 30 MG 24 hr tablet, Take 1 tablet by mouth Daily., Disp: 90 tablet, Rfl: 3    metFORMIN ER (GLUCOPHAGE-XR) 500 MG 24 hr tablet, 2 tablets Daily With Breakfast., Disp: , Rfl:     metoprolol succinate XL (TOPROL-XL) 25 MG 24 hr tablet, Take 1 tablet by mouth Daily., Disp: 30 tablet, Rfl: 11    nitroglycerin (NITROSTAT) 0.4 MG SL tablet, Place 1 tablet under the tongue Every 5 (Five) Minutes As Needed for Chest Pain. Prn chest pain, Disp: 100 tablet, Rfl: 3    sacubitril-valsartan (Entresto) 49-51 MG tablet, Take 1 tablet by mouth 2 (Two) Times a Day., Disp: 60 tablet, Rfl: 11    triamcinolone (KENALOG) 0.1 % cream, Apply  topically to the appropriate area as directed., Disp: , Rfl:     Problem List:  Patient Active Problem List   Diagnosis    Influenza A    Acute ischemic VBA thalamic stroke, left    Acute ischemic stroke    Bilateral carotid artery stenosis    Bradycardia    Coronary artery disease    Status post coronary artery bypass graft    Essential hypertension    Hyperlipidemia, mixed    Type 2 diabetes mellitus    Body fluid retention    Angina  pectoris    Acute poliomyelitis    H/O: pneumonia    History of CEA (carotid endarterectomy)    S/P PTCA (percutaneous transluminal coronary angioplasty)    Myocardiopathy    Decreased cardiac ejection fraction     Past Medical History:  Past Medical History:   Diagnosis Date    Coronary artery disease     CVA (cerebral vascular accident) 02/16/2025    Diabetes mellitus     Hyperlipidemia     Hypertension      Past Surgical History:  Past Surgical History:   Procedure Laterality Date    CARDIAC CATHETERIZATION      2015    CARDIAC CATHETERIZATION N/A 08/10/2022    Procedure: Left Heart Cath;  Surgeon: Nabil Samaniego MD;  Location:  [facility];  Service: Cardiology;  Laterality: N/A;    CARDIAC CATHETERIZATION N/A 2/27/2025    Procedure: Left Heart Cath;  Surgeon: Nabil Samaniego MD;  Location:  [facility];  Service: Cardiology;  Laterality: N/A;    CAROTID ENDARTERECTOMY      CAROTID STENT  2015    PCI  to SVG supplying the LC/OM    CORONARY ARTERY BYPASS GRAFT      1998    CORONARY STENT PLACEMENT       Social History:  Social History     Socioeconomic History    Marital status:    Tobacco Use    Smoking status: Former     Types: Cigarettes     Start date: 1975    Smokeless tobacco: Never   Vaping Use    Vaping status: Never Used   Substance and Sexual Activity    Alcohol use: Never    Drug use: Never    Sexual activity: Not Currently     Partners: Female     Birth control/protection: Post-menopausal     Allergies:  Allergies   Allergen Reactions    Statins Cough     Severe muscle aches/spasms     Immunizations:  Immunization History   Administered Date(s) Administered    Pneumococcal Polysaccharide (PPSV23) 02/24/2017, 02/24/2017    TD Preservative Free (Tenivac) 05/07/2015, 05/07/2015            In-Office Procedure(s):  No orders to display        ASCVD RIsk Score::  The ASCVD Risk score (Tiffany LANCE, et al., 2019) failed to calculate for the following  reasons:    Risk score cannot be calculated because patient has a medical history suggesting prior/existing ASCVD    Imaging:    Results for orders placed during the hospital encounter of 02/13/25    XR Spine Cervical 2 or 3 View 02/13/2025  8:51 AM    Narrative  XR SPINE CERVICAL 2 OR 3 VW    Date of Exam: 2/13/2025 8:10 AM EST    Indication: eval hardware    Comparison: None available.    Findings:  No acute fracture is identified. The alignment is anatomic. There are changes from C4 corpectomy with cage body graft with anterior cervical discectomy and fusion at C3-C5. One or both of the C3 screws are fractured. There are moderate discogenic changes  at C5-6 and C6-7. There is moderate scattered facet arthropathy and uncovertebral hypertrophy. The prevertebral soft tissues are unremarkable.    Impression  Impression:  1.Changes from C4 corpectomy with ACDF at C3-C5. One or both of the C3 screws are fractured.  2.Multilevel degenerative changes as described above.        Electronically Signed: Demario Bettencourt MD  2/13/2025 8:51 AM EST  Workstation ID: [id number]       Results for orders placed during the hospital encounter of 02/13/25    US Renal Bilateral 02/13/2025  7:02 PM    Narrative  US RENAL BILATERAL    Date of Exam: 2/13/2025 6:41 PM EST    Indication: RAÚL.    Comparison: No comparisons available.    Technique: Grayscale and color Doppler ultrasound evaluation of the kidneys and urinary bladder was performed.      Findings:  Right Kidney: Right kidney measures 11.1 cm in long axis. Renal cortical thickness and echogenicity are within normal limits.No suspicious appearing lesions.No hydronephrosis.No sonographically evident nephrolithiasis.    Left Kidney:  Left kidney measures 12.2 cm in long axis. Renal cortical thickness and echogenicity are within normal limits.No suspicious appearing lesions.. There is a simple appearing left renal cyst measuring up to 1.5 cm. No hydronephrosis.No  sonographically  evident nephrolithiasis.    Bladder: The bladder appears unremarkable.    Impression  Impression:  No hydronephrosis or sonographically evident nephrolithiasis.        Electronically Signed: Nestor Elaine  2/13/2025 7:02 PM EST  Workstation ID: [id number]      Results for orders placed during the hospital encounter of 02/13/25    CT Head Without Contrast 02/13/2025  7:31 AM    Narrative  CT HEAD WO CONTRAST    Date of Exam: 2/13/2025 7:12 AM EST    Indication: ams.    Comparison: None available.    Technique: Axial CT images were obtained of the head without contrast administration.  Coronal reconstructions were performed.  Automated exposure control and iterative reconstruction methods were used.      Findings:  There is no intracranial hemorrhage. No mass effect or midline shift. There is an area of encephalomalacia involving the right posterior temporal lobe related to sequela of remote infarct. Mild white matter findings suggesting chronic microvascular  disease. No intraventricular hemorrhage. The posterior fossa is without acute abnormality. Globes are intact and symmetric. Slight rightward deviation of the nasal septum. The mastoid air cells are well-aerated. There is no sinus fluid level. No acute  calvarial fracture. Cervical ACDF hardware noted at the upper cervical spine on the  radiograph with corpectomy cage. C3 screw appears fractured.    Impression  Impression:  1. No acute intracranial abnormality.  2. Remote right posterior temporal infarct with encephalomalacia.  3. Cervical ACDF hardware partially imaged on  radiograph. C3 screw appears fractured, cervical radiographs may be helpful to better assess.          Electronically Signed: Demetrio Hall MD  2/13/2025 7:31 AM EST  Workstation ID: [id number]      Lab Review:   Hospital Outpatient Visit on 07/01/2025   Component Date Value    LVIDd 07/01/2025 6.0     LVIDs 07/01/2025 5.0     IVSd 07/01/2025 0.96     LVPWd 07/01/2025 1.02     FS  07/01/2025 16.1     IVS/LVPW 07/01/2025 0.94     ESV(cubed) 07/01/2025 128.3     LV Sys Vol (BSA correcte* 07/01/2025 51.9     EDV(cubed) 07/01/2025 217.5     LV Partida Vol (BSA correct* 07/01/2025 79.7     LV mass(C)d 07/01/2025 243.3     LVOT area 07/01/2025 3.3     LVOT diam 07/01/2025 2.05     EDV(MOD-sp4) 07/01/2025 154.6     ESV(MOD-sp4) 07/01/2025 100.7     SV(MOD-sp4) 07/01/2025 53.8     SVi(MOD-SP4) 07/01/2025 27.8     SVi (LVOT) 07/01/2025 37.9     EF(MOD-sp4) 07/01/2025 35.0     MV E max kaiser 07/01/2025 82.9     MV A max kaiser 07/01/2025 73.6     MV dec time 07/01/2025 0.31     MV E/A 07/01/2025 1.13     Pulm A Revs Dur 07/01/2025 0.09     Med Peak E' Kaiser 07/01/2025 3.7     Lat Peak E' Kaiser 07/01/2025 6.7     TR max kaiser 07/01/2025 283.8     Avg E/e' ratio 07/01/2025 15.94     SV(LVOT) 07/01/2025 73.5     RV Mid 07/01/2025 2.40     TAPSE (>1.6) 07/01/2025 1.46     LA dimension (2D)  07/01/2025 4.5     Pulm Sys Kaiser 07/01/2025 58.7     Pulm Partida Kaiser 07/01/2025 69.5     Pulm S/D 07/01/2025 0.84     Pulm A Revs Kaiser 07/01/2025 21.0     LV V1 max 07/01/2025 90.5     LV V1 max PG 07/01/2025 3.3     LV V1 mean PG 07/01/2025 1.74     LV V1 VTI 07/01/2025 22.4     Ao pk kaiser 07/01/2025 201.7     Ao max PG 07/01/2025 16.3     Ao mean PG 07/01/2025 7.2     Ao V2 VTI 07/01/2025 44.8     MAGI(I,D) 07/01/2025 1.64     Dimensionless Index 07/01/2025 0.50     AI P1/2t 07/01/2025 636.2     MV max PG 07/01/2025 3.8     MV mean PG 07/01/2025 1.38     MV V2 VTI 07/01/2025 41.4     MVA(VTI) 07/01/2025 1.77     MV dec slope 07/01/2025 266.1     MR max kaiser 07/01/2025 538.7     MR max PG 07/01/2025 116.2     TR max PG 07/01/2025 32.7     RVSP(TR) 07/01/2025 35.7     RAP systole 07/01/2025 3.0     PA V2 max 07/01/2025 111.7     PA acc time 07/01/2025 0.11     PI end-d kaiser 07/01/2025 111.8     Ao root diam 07/01/2025 2.8     ACS 07/01/2025 1.20     EF(MOD-bp) 07/01/2025 35.0     RV Base 07/01/2025 4.20    Admission on 02/27/2025, Discharged  on 02/27/2025   Component Date Value    Glucose 02/27/2025 167 (H)     BUN 02/27/2025 21     Creatinine 02/27/2025 1.00     Sodium 02/27/2025 143     Potassium 02/27/2025 4.2     Chloride 02/27/2025 105     CO2 02/27/2025 26.4     Calcium 02/27/2025 10.3     BUN/Creatinine Ratio 02/27/2025 21.0     Anion Gap 02/27/2025 11.6     eGFR 02/27/2025 78.5     Protime 02/27/2025 13.9     INR 02/27/2025 1.08     WBC 02/27/2025 10.10     RBC 02/27/2025 4.88     Hemoglobin 02/27/2025 13.9     Hematocrit 02/27/2025 44.1     MCV 02/27/2025 90.4     MCH 02/27/2025 28.5     MCHC 02/27/2025 31.5     RDW 02/27/2025 13.3     RDW-SD 02/27/2025 43.5     MPV 02/27/2025 10.0     Platelets 02/27/2025 438     Neutrophil % 02/27/2025 52.4     Lymphocyte % 02/27/2025 30.8     Monocyte % 02/27/2025 10.0     Eosinophil % 02/27/2025 5.0     Basophil % 02/27/2025 1.3     Immature Grans % 02/27/2025 0.5     Neutrophils, Absolute 02/27/2025 5.30     Lymphocytes, Absolute 02/27/2025 3.11 (H)     Monocytes, Absolute 02/27/2025 1.01 (H)     Eosinophils, Absolute 02/27/2025 0.50 (H)     Basophils, Absolute 02/27/2025 0.13     Immature Grans, Absolute 02/27/2025 0.05     nRBC 02/27/2025 0.0    Admission on 02/24/2025, Discharged on 02/24/2025   Component Date Value    WBC 02/24/2025 12.13 (H)     RBC 02/24/2025 4.79     Hemoglobin 02/24/2025 13.8     Hematocrit 02/24/2025 43.0     MCV 02/24/2025 89.8     MCH 02/24/2025 28.8     MCHC 02/24/2025 32.1     RDW 02/24/2025 13.2     RDW-SD 02/24/2025 44.0     MPV 02/24/2025 10.1     Platelets 02/24/2025 429     Neutrophil % 02/24/2025 53.4     Lymphocyte % 02/24/2025 29.1     Monocyte % 02/24/2025 9.5     Eosinophil % 02/24/2025 6.5 (H)     Basophil % 02/24/2025 0.8     Immature Grans % 02/24/2025 0.7 (H)     Neutrophils, Absolute 02/24/2025 6.47     Lymphocytes, Absolute 02/24/2025 3.53 (H)     Monocytes, Absolute 02/24/2025 1.15 (H)     Eosinophils, Absolute 02/24/2025 0.79 (H)     Basophils, Absolute  02/24/2025 0.10     Immature Grans, Absolute 02/24/2025 0.09 (H)     nRBC 02/24/2025 0.0     Extra Tube 02/24/2025 Hold for add-ons.     Extra Tube 02/24/2025 Hold for add-ons.     PTT 02/24/2025 26.5     Protime 02/24/2025 14.0     INR 02/24/2025 1.08     Glucose 02/24/2025 119 (H)     BUN 02/24/2025 14     Creatinine 02/24/2025 0.74 (L)     Sodium 02/24/2025 140     Potassium 02/24/2025 4.3     Chloride 02/24/2025 101     CO2 02/24/2025 28.0     Calcium 02/24/2025 10.3     Total Protein 02/24/2025 8.3     Albumin 02/24/2025 4.1     ALT (SGPT) 02/24/2025 16     AST (SGOT) 02/24/2025 29     Alkaline Phosphatase 02/24/2025 59     Total Bilirubin 02/24/2025 0.3     Globulin 02/24/2025 4.2     A/G Ratio 02/24/2025 1.0     BUN/Creatinine Ratio 02/24/2025 18.9     Anion Gap 02/24/2025 11.0     eGFR 02/24/2025 94.5    No results displayed because visit has over 200 results.        Recent labs reviewed and interpreted for clinical significance and application            Level of Care:           Nabil Samaniego MD  07/15/25  .

## 2025-07-29 ENCOUNTER — TELEPHONE (OUTPATIENT)
Dept: CARDIOLOGY | Facility: CLINIC | Age: 75
End: 2025-07-29

## 2025-07-29 ENCOUNTER — PREP FOR SURGERY (OUTPATIENT)
Dept: OTHER | Facility: HOSPITAL | Age: 75
End: 2025-07-29
Payer: MEDICARE

## 2025-07-29 ENCOUNTER — OFFICE VISIT (OUTPATIENT)
Dept: CARDIOLOGY | Facility: CLINIC | Age: 75
End: 2025-07-29
Payer: MEDICARE

## 2025-07-29 ENCOUNTER — PATIENT ROUNDING (BHMG ONLY) (OUTPATIENT)
Dept: CARDIOLOGY | Facility: CLINIC | Age: 75
End: 2025-07-29

## 2025-07-29 VITALS
DIASTOLIC BLOOD PRESSURE: 50 MMHG | OXYGEN SATURATION: 94 % | BODY MASS INDEX: 31.82 KG/M2 | HEIGHT: 66 IN | SYSTOLIC BLOOD PRESSURE: 127 MMHG | WEIGHT: 198 LBS | HEART RATE: 54 BPM

## 2025-07-29 DIAGNOSIS — I10 ESSENTIAL HYPERTENSION: ICD-10-CM

## 2025-07-29 DIAGNOSIS — Z95.1 STATUS POST CORONARY ARTERY BYPASS GRAFT: ICD-10-CM

## 2025-07-29 DIAGNOSIS — Z98.61 S/P PTCA (PERCUTANEOUS TRANSLUMINAL CORONARY ANGIOPLASTY): ICD-10-CM

## 2025-07-29 DIAGNOSIS — I50.22 CHRONIC SYSTOLIC CONGESTIVE HEART FAILURE: Primary | ICD-10-CM

## 2025-07-29 RX ORDER — ATORVASTATIN CALCIUM 20 MG/1
20 TABLET, FILM COATED ORAL DAILY
COMMUNITY

## 2025-07-29 NOTE — PROGRESS NOTES
Progress note      Name: Luiz Connors ADMIT: (Not on file)   : 1950  PCP: Demario Carson APRN    MRN: [id number] LOS: 0 days   AGE/SEX: 74 y.o. male  ROOM: Room/bed info not found     Chief Complaint   Patient presents with    Establish Care    Consult       Subjective       History of present illness  Luiz Connors is a 74-year-old male patient, who has coronary artery disease, bypass in , multiple PCI's, last heart cath 2025,  revascularization left main into circumflex.  Chronic systolic congestive heart failure, ejection fraction 35% on last despite revascularization and optimal medical therapy.  Patient is here today for consideration of ICD implantation.    Past Medical History:   Diagnosis Date    Coronary artery disease     CVA (cerebral vascular accident) 2025    Diabetes mellitus     Hyperlipidemia     Hypertension      Past Surgical History:   Procedure Laterality Date    CARDIAC CATHETERIZATION          CARDIAC CATHETERIZATION N/A 08/10/2022    Procedure: Left Heart Cath;  Surgeon: Nabil Samaniego MD;  Location: [facility] CATH INVASIVE LOCATION;  Service: Cardiology;  Laterality: N/A;    CARDIAC CATHETERIZATION N/A 2025    Procedure: Left Heart Cath;  Surgeon: Nabil Samaniego MD;  Location:  [facility] CATH INVASIVE LOCATION;  Service: Cardiology;  Laterality: N/A;    CAROTID ENDARTERECTOMY      CAROTID STENT      PCI  to SVG supplying the LC/OM    CORONARY ARTERY BYPASS GRAFT          CORONARY STENT PLACEMENT       Family History   Problem Relation Age of Onset    Heart disease Mother     Hypertension Mother      Social History     Tobacco Use    Smoking status: Former     Types: Cigarettes     Start date:      Passive exposure: Past    Smokeless tobacco: Never   Vaping Use    Vaping status: Never Used   Substance Use Topics    Alcohol use: Never    Drug use: Never       Current Outpatient Medications:     acetaminophen (TYLENOL) 325 MG  tablet, Take 2 tablets by mouth Every 12 (Twelve) Hours As Needed for Mild Pain., Disp: , Rfl:     amLODIPine (NORVASC) 2.5 MG tablet, Take 1 tablet by mouth Daily., Disp: 30 tablet, Rfl: 11    aspirin 81 MG chewable tablet, Chew 1 tablet Daily., Disp: , Rfl:     atorvastatin (LIPITOR) 20 MG tablet, Take 1 tablet by mouth Daily., Disp: , Rfl:     clopidogrel (PLAVIX) 75 MG tablet, Take 1 tablet by mouth Daily., Disp: 30 tablet, Rfl: 0    ezetimibe (ZETIA) 10 MG tablet, Take 1 tablet by mouth Daily., Disp: 90 tablet, Rfl: 3    glipizide (GLUCOTROL) 5 MG tablet, Take 0.5 tablets by mouth Daily., Disp: , Rfl:     melatonin 5 MG tablet tablet, Take 2 tablets by mouth Every Night., Disp: , Rfl:     metFORMIN ER (GLUCOPHAGE-XR) 500 MG 24 hr tablet, 2 tablets Daily With Breakfast., Disp: , Rfl:     metoprolol succinate XL (TOPROL-XL) 25 MG 24 hr tablet, Take 1 tablet by mouth Daily., Disp: 30 tablet, Rfl: 11    nitroglycerin (NITROSTAT) 0.4 MG SL tablet, Place 1 tablet under the tongue Every 5 (Five) Minutes As Needed for Chest Pain. Prn chest pain, Disp: 100 tablet, Rfl: 3    sacubitril-valsartan (Entresto) 49-51 MG tablet, Take 1 tablet by mouth 2 (Two) Times a Day., Disp: 60 tablet, Rfl: 11    triamcinolone (KENALOG) 0.1 % cream, Apply  topically to the appropriate area as directed., Disp: , Rfl:     isosorbide mononitrate (IMDUR) 30 MG 24 hr tablet, Take 1 tablet by mouth Daily. (Patient not taking: Reported on 7/29/2025), Disp: 90 tablet, Rfl: 3  Allergies:  Statins      Physical Exam  VITALS REVIEWED    General:      well developed, in no acute distress.    Head:      normocephalic and atraumatic.    Eyes:      PERRL/EOM intact, conjunctiva and sclera clear with out nystagmus.    Neck:      no masses, thyromegaly,  trachea central with normal respiratory effort and PMI displaced laterally  Lungs:      Clear to auscultation bilaterally  Heart:       Regular rate and rhythm  Msk:      no deformity or scoliosis noted of  thoracic or lumbar spine.    Pulses:      pulses normal in all 4 extremities.    Extremities:       No lower extremity edema  Neurologic:      no focal deficits.   alert oriented x3  Skin:      intact without lesions or rashes.    Psych:      alert and cooperative; normal mood and affect; normal attention span and concentration.      Result Review :               Pertinent cardiac workup    Heart cath 2/27/2025,  revascularization left main into circumflex, patent LIMA to LAD, patent RCA.  Echo 2/15/2025 ejection fraction 25 to 30%  Echo 7/1/2025 EF 35%, global hypokinesia.  Personally reviewed  EKG 2/13/2025 sinus rhythm narrow QRS      Procedures        Assessment and Plan      Luiz Connors is a 74-year-old male patient who has coronary artery disease, prior bypass, multiple PCI's, ischemic cardiomyopathy, chronic systolic congestive heart failure, NYHA class III, ejection fraction 35% or less despite revascularization and optimal medical therapy with Toprol 25 once a day, Entresto 49/51 twice daily along with therapy for his CAD.  Patient therefore qualifies for ICD implantation for primary prevention of sudden cardiac death.  His QRS is relatively narrow, no left bundle branch block, therefore dual-chamber ICD should suffice.  His life expectancy with an ICD is greater than 1 year.  Shared decision making regarding ICD implantation was performed with the patient using [place] decision-making aid, patient had questions regarding limitations after ICD implantation and was monitoring, all of which were answered.  He is agreeable to proceed with ICD implantation.      Diagnoses and all orders for this visit:    1. Chronic systolic congestive heart failure (Primary)    2. S/P PTCA (percutaneous transluminal coronary angioplasty)    3. Essential hypertension    4. Status post coronary artery bypass graft  Overview:  Remote CABG performed 1998 with subsequent PCI to posterior lateral marginal branch in 2011.              No follow-ups on file.  Patient was given instructions and counseling regarding his condition or for health maintenance advice. Please see specific information pulled into the AVS if appropriate.     Electronically signed by Naima Campbell MD, 07/29/25, 11:40 AM EDT.

## 2025-07-30 RX ORDER — LOSARTAN POTASSIUM 50 MG/1
50 TABLET ORAL DAILY
Qty: 90 TABLET | Refills: 0 | OUTPATIENT
Start: 2025-07-30

## (undated) DEVICE — CATH DIAG IMPULSE FR4 6F 100CM

## (undated) DEVICE — CATH MPAC STP 6F: Brand: SUPER TORQUE

## (undated) DEVICE — PINNACLE INTRODUCER SHEATH: Brand: PINNACLE

## (undated) DEVICE — PK TRY HEART CATH 50

## (undated) DEVICE — ELECTRD DEFIB M/FUNC PROPADZ RADIOL 2PK

## (undated) DEVICE — ST ACC MICROPUNCTURE STFF/CANN PLAT/TP 4F 21G 40CM

## (undated) DEVICE — DGW .035 FC J3MM 150CM TEF HEP: Brand: EMERALD

## (undated) DEVICE — GW PTFE EMERALD HEPCOAT FC J TIP STD .035 3MM 150CM

## (undated) DEVICE — SHT AIR TRANSFR COMFRT GLIDE LAT 40X80IN

## (undated) DEVICE — CATH DIAG IMPULSE FL4 6F 100CM